# Patient Record
Sex: FEMALE | Race: BLACK OR AFRICAN AMERICAN | Employment: UNEMPLOYED | URBAN - METROPOLITAN AREA
[De-identification: names, ages, dates, MRNs, and addresses within clinical notes are randomized per-mention and may not be internally consistent; named-entity substitution may affect disease eponyms.]

---

## 2017-01-25 DIAGNOSIS — E78.00 PURE HYPERCHOLESTEROLEMIA: ICD-10-CM

## 2017-01-29 LAB
ALBUMIN SERPL-MCNC: 4.2 G/DL (ref 3.5–5.5)
ALP SERPL-CCNC: 103 IU/L (ref 39–117)
ALT SERPL-CCNC: 18 IU/L (ref 0–32)
AST SERPL-CCNC: 17 IU/L (ref 0–40)
BILIRUB DIRECT SERPL-MCNC: 0.13 MG/DL (ref 0–0.4)
BILIRUB SERPL-MCNC: 0.4 MG/DL (ref 0–1.2)
CHOLEST SERPL-MCNC: 157 MG/DL (ref 100–199)
HDL SERPL QN: 10 NM
HDL SERPL-SCNC: 36.2 UMOL/L
HDLC SERPL-MCNC: 66 MG/DL
HLD.LARGE SERPL-SCNC: 11 UMOL/L
INTERPRETATION, 910389: NORMAL
LDL SERPL QN: 20.6 NM
LDL SERPL QN: 20.6 NM
LDL SERPL-SCNC: 585 NMOL/L
LDL SMALL SERPL-SCNC: 232 NMOL/L
LDL SMALL SERPL-SCNC: 232 NMOL/L
LDLC SERPL CALC-MCNC: 75 MG/DL (ref 0–99)
LP-IR SCORE SERPL: 33
PROT SERPL-MCNC: 6.8 G/DL (ref 6–8.5)
TRIGL SERPL-MCNC: 82 MG/DL (ref 0–149)
VLDL LARGE SERPL-SCNC: 2.6 NMOL/L
VLDL SERPL QN: 46.3 NM

## 2017-04-27 ENCOUNTER — OFFICE VISIT (OUTPATIENT)
Dept: FAMILY MEDICINE CLINIC | Age: 57
End: 2017-04-27

## 2017-04-27 VITALS
TEMPERATURE: 98.2 F | SYSTOLIC BLOOD PRESSURE: 121 MMHG | WEIGHT: 197 LBS | HEIGHT: 65 IN | DIASTOLIC BLOOD PRESSURE: 79 MMHG | BODY MASS INDEX: 32.82 KG/M2 | RESPIRATION RATE: 20 BRPM | HEART RATE: 80 BPM

## 2017-04-27 DIAGNOSIS — Z12.39 SCREENING FOR BREAST CANCER: ICD-10-CM

## 2017-04-27 DIAGNOSIS — J06.9 ACUTE URI: Primary | ICD-10-CM

## 2017-04-27 DIAGNOSIS — J30.1 SEASONAL ALLERGIC RHINITIS DUE TO POLLEN: ICD-10-CM

## 2017-04-27 DIAGNOSIS — R05.9 COUGH: ICD-10-CM

## 2017-04-27 RX ORDER — GUAIFENESIN AND DEXTROMETHORPHAN HYDROBROMIDE 1200; 60 MG/1; MG/1
TABLET, EXTENDED RELEASE ORAL
Qty: 20 TAB | Refills: 0 | Status: SHIPPED | OUTPATIENT
Start: 2017-04-27 | End: 2017-10-20

## 2017-04-27 RX ORDER — AZITHROMYCIN 250 MG/1
TABLET, FILM COATED ORAL
Qty: 6 TAB | Refills: 0 | Status: SHIPPED | OUTPATIENT
Start: 2017-04-27 | End: 2017-05-02

## 2017-04-27 RX ORDER — FOLIC ACID 1 MG/1
TABLET ORAL
Refills: 0 | COMMUNITY
Start: 2017-03-26

## 2017-04-27 RX ORDER — CETIRIZINE HCL 10 MG
10 TABLET ORAL DAILY
Qty: 30 TAB | Refills: 5 | Status: SHIPPED | OUTPATIENT
Start: 2017-04-27 | End: 2018-06-08 | Stop reason: SDUPTHER

## 2017-04-27 RX ORDER — METHOTREXATE 2.5 MG/1
TABLET ORAL
COMMUNITY
Start: 2017-04-21 | End: 2022-03-15 | Stop reason: ALTCHOICE

## 2017-04-27 RX ORDER — MOMETASONE FUROATE 50 UG/1
2 SPRAY, METERED NASAL DAILY
Qty: 1 CONTAINER | Refills: 5 | Status: SHIPPED | OUTPATIENT
Start: 2017-04-27 | End: 2019-03-14 | Stop reason: SDUPTHER

## 2017-04-27 NOTE — MR AVS SNAPSHOT
Visit Information Date & Time Provider Department Dept. Phone Encounter #  
 4/27/2017  1:30 PM Karolyn Ross [id number] Follow-up Instructions Return in about 6 months (around 10/27/2017), or if cold symptoms worsen or fail to improve, for routine care. Upcoming Health Maintenance Date Due DTaP/Tdap/Td series (1 - Tdap) 4/18/1983 FOBT Q 1 YEAR AGE 50-75 10/2/2017 PAP AKA CERVICAL CYTOLOGY 7/15/2018 BREAST CANCER SCRN MAMMOGRAM 8/2/2018 Allergies as of 4/27/2017  Review Complete On: 4/27/2017 By: Suni Sheehan LPN Severity Noted Reaction Type Reactions Percocet [Oxycodone-acetaminophen]  04/08/2014    Other (comments) Slows heart rate. Current Immunizations  Reviewed on 10/27/2016 No immunizations on file. Not reviewed this visit You Were Diagnosed With   
  
 Codes Comments Acute URI    -  Primary ICD-10-CM: J06.9 ICD-9-CM: 465.9 Seasonal allergic rhinitis due to pollen     ICD-10-CM: J30.1 ICD-9-CM: 477.0 Cough     ICD-10-CM: R05 ICD-9-CM: 824. 2 Vitals BP Pulse Temp Resp Height(growth percentile) Weight(growth percentile) 121/79 (BP 1 Location: Left arm, BP Patient Position: Sitting) 80 98.2 °F (36.8 °C) (Oral) 20 5' 5\" (1.651 m) 197 lb (89.4 kg) BMI OB Status Smoking Status 32.78 kg/m2 Hysterectomy Never Smoker Vitals History BMI and BSA Data Body Mass Index Body Surface Area 32.78 kg/m 2 2.02 m 2 Preferred Pharmacy Pharmacy Name Phone [id number] [address] [phone] Your Updated Medication List  
  
   
This list is accurate as of: 4/27/17  2:00 PM.  Always use your most recent med list.  
  
  
  
  
 azithromycin 250 mg tablet Commonly known as:  Nery Stover Take 2 tablets today, then take 1 tablet daily  
  
 cetirizine 10 mg tablet Commonly known as:  ZYRTEC Take 1 Tab by mouth daily. For allergy Cholecalciferol (Vitamin D3) 2,000 unit Cap capsule Commonly known as:  VITAMIN D3 Take  by mouth daily. dextromethorphan-guaiFENesin 60-1,200 mg Tb12 Commonly known as:  Ishmael & Ishmael DM Take one tablet by mouth twice daily as needed for cough. folic acid 1 mg tablet Commonly known as:  FOLVITE  
TAKE 1 TABLET BY MOUTH ONCE DAILY  
  
 methotrexate 2.5 mg tablet Commonly known as:  Donneta  * mometasone 50 mcg/actuation nasal spray Commonly known as:  NASONEX  
1 [place] by Both Nostrils route daily. * mometasone 50 mcg/actuation nasal spray Commonly known as:  NASONEX  
2 Sprays by Both Nostrils route daily. For allergy symptoms PATADAY 0.2 % Drop ophthalmic solution Generic drug:  olopatadine Apply 1 Drop to eye daily. predniSONE 5 mg tablet Commonly known as:  Harper Vipul Take 5 mg by mouth daily. traMADol 50 mg tablet Commonly known as:  ULTRAM  
Take 50 mg by mouth every six (6) hours as needed for Pain. VITAMIN C 500 mg tablet Generic drug:  ascorbic acid (vitamin C) Take  by mouth daily. * Notice: This list has 2 medication(s) that are the same as other medications prescribed for you. Read the directions carefully, and ask your doctor or other care provider to review them with you. Prescriptions Sent to Pharmacy Refills  
 mometasone (NASONEX) 50 mcg/actuation nasal spray 5 Si Sprays by Both Nostrils route daily. For allergy symptoms Class: Normal  
 Pharmacy: [facility]/pharmacy #[id number] Ph #: [phone] Route: Both Nostrils  
 cetirizine (ZYRTEC) 10 mg tablet 5 Sig: Take 1 Tab by mouth daily. For allergy Class: Normal  
 Pharmacy: [facility]/pharmacy #[id number] Ph #: [phone] Route: Oral  
 dextromethorphan-guaiFENesin (MUCINEX DM) 60-1,200 mg Tb12 0 Sig: Take one tablet by mouth twice daily as needed for cough. Class: Normal  
 Pharmacy: [facility]/pharmacy #[id number]- [place], [address] [facility] Ph #: [phone]  
 azithromycin (ZITHROMAX) 250 mg tablet 0 Sig: Take 2 tablets today, then take 1 tablet daily Class: Normal  
 Pharmacy: [facility]/pharmacy #[id number]- [place], [address] [facility] Ph #: [phone] Follow-up Instructions Return in about 6 months (around 10/27/2017), or if cold symptoms worsen or fail to improve, for routine care. Patient Instructions Instructions: For stuffy nose, use an OTC nasal decongestant such as Afrin, Dristan or Neosynephrine. Use this away from the Flonase. Introducing [facility] HE[facility]! [facility] introduces [employer] patient portal. Now you can access parts of your medical record, email your doctor's office, and request medication refills online. 1. In your internet browser, go to [email or web address] 2. Click on the First Time User? Click Here link in the Sign In box. You will see the New Member Sign Up page. 3. Enter your [employer] Access Code exactly as it appears below. You will not need to use this code after youve completed the sign-up process. If you do not sign up before the expiration date, you must request a new code. · [employer] Access Code: [email or web address] Expires: 7/26/2017  2:00 PM 
 
4. Enter the last four digits of your Social Security Number (xxxx) and Date of Birth (mm/dd/yyyy) as indicated and click Submit. You will be taken to the next sign-up page. 5. Create a Dev[employer] ID. This will be your Dev[employer] login ID and cannot be changed, so think of one that is secure and easy to remember. 6. Create a [employer] password. You can change your password at any time. 7. Enter your Password Reset Question and Answer. This can be used at a later time if you forget your password. 8. Enter your e-mail address.  You will receive e-mail notification when new information is available in [employer]. 9. Click Sign Up. You can now view and download portions of your medical record. 10. Click the Download Summary menu link to download a portable copy of your medical information. If you have questions, please visit the Frequently Asked Questions section of the [employer] website. Remember, [employer] is NOT to be used for urgent needs. For medical emergencies, dial 911. Now available from your iPhone and Android! Please provide this summary of care documentation to your next provider. Your primary care clinician is listed as Clyde Sims. If you have any questions after today's visit, please call [phone].

## 2017-04-27 NOTE — PATIENT INSTRUCTIONS
Instructions: For stuffy nose, use an OTC nasal decongestant such as Afrin, Dristan or Neosynephrine. Use this away from the Flonase.

## 2017-04-27 NOTE — PROGRESS NOTES
HISTORY OF PRESENT ILLNESS  Pipe Araujo is a 56 y.o. female. HPI  Pt with nasal congestion, cough, no fever. Has allergies. Wants mammogram order. ROS  ROS negative except for symptoms noted in HPI. Physical Exam  Gen: Oriented to person, place and time and well-developed, well-nourished and in no distress. HEENT:    Head: normocephalic and atraumatic. Eyes:  EOM are normal. Pupils equal and round. Ears:  TMs and canals normal, no erythema, bulging or retraction  Mouth:  No posterior pharyngeal erythema or exudates. No tonsillar hypertrophy. Neck:  Normal range of motion. Neck supple. Cardiovascular: normal rate, regular rhythm and normal heart sounds. Pulmonary/Chest:  Effort normal and breath sounds normal.  No respiratory distress. No wheezes, no rales. Abdominal: soft, normal  bowel sounds. Musculoskeletal:  No edema, no tenderness. No calf tenderness or edema. Neurological:  Alert, oriented to person, place and time. Skin: skin is warm and dry. ASSESSMENT and PLAN  Follow-up Disposition:  Return in about 6 months (around 10/27/2017), or if cold symptoms worsen or fail to improve, for routine care. 1. Seasonal allergic rhinitis due to pollen     - mometasone (NASONEX) 50 mcg/actuation nasal spray; 2 Sprays by Both Nostrils route daily. For allergy symptoms  Dispense: 1 Container; Refill: 5  - cetirizine (ZYRTEC) 10 mg tablet; Take 1 Tab by mouth daily. For allergy  Dispense: 30 Tab; Refill: 5    2. Cough     - dextromethorphan-guaiFENesin (MUCINEX DM) 60-1,200 mg Tb12; Take one tablet by mouth twice daily as needed for cough. Dispense: 20 Tab; Refill: 0    3. Acute URI     - azithromycin (ZITHROMAX) 250 mg tablet; Take 2 tablets today, then take 1 tablet daily  Dispense: 6 Tab; Refill: 0    4. Screening for breast cancer     - [facility] MAMMO BI SCREENING INCL CAD;  Future    I  have discussed the diagnosis with the patient and the intended treatment plan as seen in the above orders. Patient has provided input and agrees with goals. The patient has received an after-visit summary and questions were answered concerning future plans. I have discussed medication side effects and warnings with the patient as well.

## 2017-08-16 ENCOUNTER — HOSPITAL ENCOUNTER (OUTPATIENT)
Dept: MAMMOGRAPHY | Age: 57
Discharge: HOME | End: 2017-08-16
Attending: FAMILY MEDICINE
Payer: MEDICARE

## 2017-08-16 DIAGNOSIS — Z12.31 ENCOUNTER FOR SCREENING MAMMOGRAM FOR MALIGNANT NEOPLASM OF BREAST: ICD-10-CM

## 2017-08-16 DIAGNOSIS — Z12.31 VISIT FOR SCREENING MAMMOGRAM: ICD-10-CM

## 2017-08-16 PROCEDURE — 77063 BREAST TOMOSYNTHESIS BI: CPT

## 2017-09-18 ENCOUNTER — HOSPITAL ENCOUNTER (OUTPATIENT)
Dept: MRI IMAGING | Age: 57
Discharge: HOME | End: 2017-09-18
Attending: ORTHOPAEDIC SURGERY
Payer: MEDICARE

## 2017-09-18 DIAGNOSIS — M25.562 LEFT KNEE PAIN: ICD-10-CM

## 2017-09-18 PROCEDURE — 73721 MRI JNT OF LWR EXTRE W/O DYE: CPT

## 2017-10-20 ENCOUNTER — OFFICE VISIT (OUTPATIENT)
Dept: FAMILY MEDICINE CLINIC | Age: 57
End: 2017-10-20

## 2017-10-20 ENCOUNTER — TELEPHONE (OUTPATIENT)
Dept: FAMILY MEDICINE CLINIC | Age: 57
End: 2017-10-20

## 2017-10-20 VITALS
HEART RATE: 71 BPM | BODY MASS INDEX: 32.82 KG/M2 | WEIGHT: 197 LBS | SYSTOLIC BLOOD PRESSURE: 148 MMHG | TEMPERATURE: 98.1 F | HEIGHT: 65 IN | DIASTOLIC BLOOD PRESSURE: 85 MMHG | RESPIRATION RATE: 18 BRPM

## 2017-10-20 DIAGNOSIS — Z23 ENCOUNTER FOR IMMUNIZATION: ICD-10-CM

## 2017-10-20 DIAGNOSIS — E78.00 PURE HYPERCHOLESTEROLEMIA: ICD-10-CM

## 2017-10-20 DIAGNOSIS — Z01.419 ENCOUNTER FOR GYNECOLOGICAL EXAMINATION WITHOUT ABNORMAL FINDING: Primary | ICD-10-CM

## 2017-10-20 DIAGNOSIS — Z12.11 SCREEN FOR COLON CANCER: ICD-10-CM

## 2017-10-20 DIAGNOSIS — R03.0 ELEVATED BLOOD PRESSURE READING: ICD-10-CM

## 2017-10-20 DIAGNOSIS — K21.9 GASTROESOPHAGEAL REFLUX DISEASE, ESOPHAGITIS PRESENCE NOT SPECIFIED: ICD-10-CM

## 2017-10-20 DIAGNOSIS — R13.10 DYSPHAGIA, UNSPECIFIED TYPE: ICD-10-CM

## 2017-10-20 RX ORDER — RANITIDINE 300 MG/1
300 TABLET ORAL DAILY
Qty: 30 TAB | Refills: 1 | Status: SHIPPED | OUTPATIENT
Start: 2017-10-20 | End: 2017-11-17 | Stop reason: SDUPTHER

## 2017-10-20 NOTE — PROGRESS NOTES
Subjective: Hermilo León is a 56 y.o. female here for annual physical exam.  She is having trouble swallowing, like something is in her esophagus. Evidently, she was taking Nexium for her gastroesophageal reflux disease and stopped it because she was concerned about side effects. Now she is having gastroesophageal reflux disease symptoms. Health Habits. Lifestyle:  Occupation:  Disabled due to her osteoarthritis, occipital neuralgia  Household members:  2, patient, spouse  Doing a monthly breast self exam:  yes  Last dental appointment:   About 2 months ago  Last eye exam:  This past April  Uses seatbelts regularly :  yes  Getting regular exercise:  yes  Last colonoscopy:   2013  Last mammogram:  This summer       Patient Active Problem List   Diagnosis Code    Heart palpitations R00.2    Occipital neuralgia M54.81    Allergic rhinitis J30.9    Anxiety F41.9    Depression F32.9    Insomnia G47.00    GERD (gastroesophageal reflux disease) K21.9    Headache(784.0) R51    Cervicalgia M54.2    Migraine without aura, without mention of intractable migraine without mention of status migrainosus G43.009    Family history of premature CAD Z80.55    Pure hypercholesterolemia E78.00    RA (rheumatoid arthritis) ([facility] [address] M06.9     Past Medical History:   Diagnosis Date    Acute meniscal tear of knee 08/15/2017    Dr. Ambika Bhatia    Acute torn meniscus of knee     Right knee    Allergic rhinitis 6/16/2014    Anxiety 7/14/2014    Arthritis     Bone spur     Left shoulder    Dense breast tissue on mammogram     Depression 7/14/2014    Family history of premature CAD 7/15/2015    GERD (gastroesophageal reflux disease) 9/10/2014    Headache     Hx of mammogram April 2014    WNL per pt.  Hyperlipidemia 7/28/2015    Insomnia 7/14/2014    Neuralgia     Occipital neuralgia    Occipital neuralgia 6/16/2014    RA (rheumatoid arthritis) ([facility] [address]     Screening for malignant neoplasm of the cervix Approx.  7 yrs ago    Negative per pt. Past Surgical History:   Procedure Laterality Date    HX CHOLECYSTECTOMY  1986    HX ORTHOPAEDIC  4/16/14    Right knee    HX OTHER SURGICAL  March 2013    Shaved bone spur L shoulder    HX PELVIC LAPAROSCOPY  2001    Laproscopy of abdomen - removal of scar tissue    HX SHOULDER ARTHROSCOPY  6/2013    Left shoulder     HX TOTAL ABDOMINAL HYSTERECTOMY  1999    DUB, fibroids      Family History   Problem Relation Age of Onset    Diabetes Mother     Cancer Mother      cervical    Heart Disease Father 80     MI    Cancer Father      prostate    Cancer Sister      breast    Breast Cancer Sister     Heart Disease Brother 62     MI    Cancer Sister      breast    Breast Cancer Sister     Diabetes Sister     Heart Disease Sister 61     CAD    No Known Problems Sister     No Known Problems Sister     Cancer Brother      leukemia    No Known Problems Brother     No Known Problems Brother     No Known Problems Brother     Thyroid Disease Daughter     No Known Problems Daughter     No Known Problems Son     Heart Disease Paternal Grandmother      Social History   Substance Use Topics    Smoking status: Never Smoker    Smokeless tobacco: Never Used    Alcohol use 1.8 oz/week     3 Cans of beer, 0 Standard drinks or equivalent per week      Comment: Beer     Allergies   Allergen Reactions    Percocet [Oxycodone-Acetaminophen] Other (comments)     Slows heart rate. Current Outpatient Prescriptions   Medication Sig Dispense Refill    methotrexate (RHEUMATREX) 2.5 mg tablet       folic acid (FOLVITE) 1 mg tablet TAKE 1 TABLET BY MOUTH ONCE DAILY  0    mometasone (NASONEX) 50 mcg/actuation nasal spray 2 Sprays by Both Nostrils route daily. For allergy symptoms 1 Container 5    cetirizine (ZYRTEC) 10 mg tablet Take 1 Tab by mouth daily. For allergy 30 Tab 5    predniSONE (DELTASONE) 5 mg tablet Take 5 mg by mouth daily.       PATADAY 0.2 % drop ophthalmic solution Apply 1 Drop to eye daily.  ascorbic acid (VITAMIN C) 500 mg tablet Take  by mouth daily.  mometasone (NASONEX) 50 mcg/actuation nasal spray 1 [place] by Both Nostrils route daily. 1 Container 5    Cholecalciferol, Vitamin D3, 2,000 unit cap Take  by mouth daily.           Review of Systems  A comprehensive review of systems was negative except for: Cardiovascular: positive for lower extremity edema  Gastrointestinal: positive for reflux symptoms  Neurological: positive for headaches and she has occipital neuralgia and has been seen by neurology  Allergic/Immunologic: positive for hay fever    Objective:  Visit Vitals    /85    Pulse 71    Temp 98.1 °F (36.7 °C) (Oral)    Resp 18    Ht 5' 5\" (1.651 m)    Wt 197 lb (89.4 kg)    BMI 32.78 kg/m2     Physical Examination:   General appearance - alert, well appearing, and in no distress  Mental status - alert, oriented to person, place, and time, normal mood, behavior, speech, dress, motor activity, and thought processes  Eyes - pupils equal and reactive, extraocular eye movements intact, sclera anicteric  Ears - bilateral TM's and external ear canals normal  Nose - normal and patent, no erythema, discharge or polyps  Mouth - mucous membranes moist, pharynx normal without lesions and dental hygiene good  Neck - supple, no significant adenopathy, carotids upstroke normal bilaterally, no bruits, thyroid exam: thyroid is normal in size without nodules or tenderness  Lymphatics - no palpable lymphadenopathy, no hepatosplenomegaly  Chest - clear to auscultation, no wheezes, rales or rhonchi, symmetric air entry  Heart - normal rate, regular rhythm, normal S1, S2, no murmurs, rubs, clicks or gallops  Abdomen - soft, nontender, nondistended, no masses or organomegaly  bowel sounds normal  Breasts - breasts appear normal, no suspicious masses, no skin or nipple changes or axillary nodes  PELVIC EXAM: normal external genitalia, vulva, vagina, cervix, uterus and adnexa  Rectal - Rectal - normal rectal, no masses  Neurological - alert, oriented, normal speech, no focal findings or movement disorder noted  Musculoskeletal - normal gait  Extremities - peripheral pulses normal, no pedal edema, no clubbing or cyanosis  Skin - normal coloration and turgor, no rashes, no suspicious skin lesions noted     Assessment/Plan:    ICD-10-CM ICD-9-CM    1. Encounter for gynecological examination without abnormal finding T31.666 I98.94 METABOLIC PANEL, COMPREHENSIVE      CBC WITH AUTOMATED DIFF   2. Dysphagia, unspecified type R13.10 787.20 XR BA SWALLOW ESOPHOGRAM   3. Gastroesophageal reflux disease, esophagitis presence not specified K21.9 530.81 raNITIdine (ZANTAC) 300 mg tablet   4. Pure hypercholesterolemia W49.69 399.5 METABOLIC PANEL, COMPREHENSIVE   5. Elevated blood pressure reading R03.0 796.2    6. Encounter for immunization Z23 V03.89 TETANUS, DIPHTHERIA TOXOIDS AND ACELLULAR PERTUSSIS VACCINE (TDAP), IN INDIVIDS. >=7, IM   7. Screen for colon cancer Z12.11 V76.51 OCCULT BLOOD, IMMUNOASSAY (FIT)         Labs per orders. Barium swallow  Zantac  TDAP at pharmacy    Follow-up Disposition:  Return in about 6 weeks (around 12/1/2017) for reflux, check blood pressure. Reviewed plan of care. Patient has provided input and agrees with goals.

## 2017-10-20 NOTE — PROGRESS NOTES
Chief Complaint   Patient presents with    Well Woman     Chief Complaint   Patient presents with    Well Woman    Dysphagia     x 2 mos     1. Have you been to the ER, urgent care clinic since your last visit? Yes [facility] for knee tear 08/2017  Hospitalized since your last visit? No    2. Have you seen or consulted any other health care providers outside of the [address] [place] [address] since your last visit? Include any pap smears or colon screening.  No    Health Maintenance Due   Topic Date Due    DTaP/Tdap/Td  (1 - Tdap) 04/18/1983    Flu Vaccine  08/01/2017    Stool testing for trace blood  10/02/2017

## 2017-10-20 NOTE — MR AVS SNAPSHOT
Visit Information Date & Time Provider Department Dept. Phone Encounter #  
 10/20/2017  1:00 PM Karolyn Glover [id number] Follow-up Instructions Return in about 6 weeks (around 12/1/2017) for reflux, check blood pressure. Upcoming Health Maintenance Date Due DTaP/Tdap/Td series (1 - Tdap) 4/18/1983 FOBT Q 1 YEAR AGE 50-75 10/2/2017 PAP AKA CERVICAL CYTOLOGY 7/15/2018 BREAST CANCER SCRN MAMMOGRAM 8/16/2019 Allergies as of 10/20/2017  Review Complete On: 10/20/2017 By: Morteza Prince MD  
  
 Severity Noted Reaction Type Reactions Percocet [Oxycodone-acetaminophen]  04/08/2014    Other (comments) Slows heart rate. Current Immunizations  Reviewed on 10/27/2016 Name Date Tdap  Incomplete Not reviewed this visit You Were Diagnosed With   
  
 Codes Comments Encounter for gynecological examination without abnormal finding    -  Primary ICD-10-CM: N58.628 ICD-9-CM: V72.31 Dysphagia, unspecified type     ICD-10-CM: R13.10 ICD-9-CM: 787.20 Gastroesophageal reflux disease, esophagitis presence not specified     ICD-10-CM: K21.9 ICD-9-CM: 530.81 Pure hypercholesterolemia     ICD-10-CM: E78.00 ICD-9-CM: 272.0 Elevated blood pressure reading     ICD-10-CM: R03.0 ICD-9-CM: 796.2 Encounter for immunization     ICD-10-CM: A03 ICD-9-CM: V03.89 Screen for colon cancer     ICD-10-CM: Z12.11 ICD-9-CM: V76.51 Vitals BP Pulse Temp Resp Height(growth percentile) Weight(growth percentile) 148/85 71 98.1 °F (36.7 °C) (Oral) 18 5' 5\" (1.651 m) 197 lb (89.4 kg) BMI OB Status Smoking Status 32.78 kg/m2 Hysterectomy Never Smoker BMI and BSA Data Body Mass Index Body Surface Area 32.78 kg/m 2 2.02 m 2 Preferred Pharmacy Pharmacy Name Phone [facility]ACY #[id number]- [address] [phone] Your Updated Medication List  
  
   
This list is accurate as of: 10/20/17  2:06 PM.  Always use your most recent med list.  
  
  
  
  
 cetirizine 10 mg tablet Commonly known as:  ZYRTEC Take 1 Tab by mouth daily. For allergy Cholecalciferol (Vitamin D3) 2,000 unit Cap capsule Commonly known as:  VITAMIN D3 Take  by mouth daily. folic acid 1 mg tablet Commonly known as:  FOLVITE  
TAKE 1 TABLET BY MOUTH ONCE DAILY  
  
 methotrexate 2.5 mg tablet Commonly known as:  Benedetta Perish * mometasone 50 mcg/actuation nasal spray Commonly known as:  NASONEX  
1 [place] by Both Nostrils route daily. * mometasone 50 mcg/actuation nasal spray Commonly known as:  NASONEX  
2 Sprays by Both Nostrils route daily. For allergy symptoms PATADAY 0.2 % Drop ophthalmic solution Generic drug:  olopatadine Apply 1 Drop to eye daily. predniSONE 5 mg tablet Commonly known as:  Marcelene Im Take 5 mg by mouth daily. raNITIdine 300 mg tablet Commonly known as:  ZANTAC Take 1 Tab by mouth daily. VITAMIN C 500 mg tablet Generic drug:  ascorbic acid (vitamin C) Take  by mouth daily. * Notice: This list has 2 medication(s) that are the same as other medications prescribed for you. Read the directions carefully, and ask your doctor or other care provider to review them with you. Prescriptions Sent to Pharmacy Refills  
 raNITIdine (ZANTAC) 300 mg tablet 1 Sig: Take 1 Tab by mouth daily. Class: Normal  
 Pharmacy: [facility]/pharmacy [address] [place] [address] Ph #: [phone] Route: Oral  
  
We Performed the Following CBC WITH AUTOMATED DIFF [30771 CPT(R)] METABOLIC PANEL, COMPREHENSIVE [66779 CPT(R)] OCCULT BLOOD, IMMUNOASSAY (FIT) [id number] CPT(R)] TETANUS, DIPHTHERIA TOXOIDS AND ACELLULAR PERTUSSIS VACCINE (TDAP), IN INDIVIDS. >=7, IM [id number] CPT(R)] Follow-up Instructions Return in about 6 weeks (around 12/1/2017) for reflux, check blood pressure. To-Do List   
 10/23/2017 Imaging:  XR BA SWALLOW ESOPHOGRAM   
  
  
Introducing datango! Dear Debbie Gruber: 
Thank you for requesting a [employer] account. Our records indicate that you already have an active [employer] account. You can access your account anytime at [email or web address] Did you know that you can access your hospital and ER discharge instructions at any time in [employer]? You can also review all of your test results from your hospital stay or ER visit. Additional Information If you have questions, please visit the Frequently Asked Questions section of the [employer] website at [email or web address] Remember, [employer] is NOT to be used for urgent needs. For medical emergencies, dial 911. Now available from your iPhone and Android! Please provide this summary of care documentation to your next provider. Your primary care clinician is listed as Sari Hart. If you have any questions after today's visit, please call [phone].

## 2017-10-21 LAB
ALBUMIN SERPL-MCNC: 4.5 G/DL (ref 3.5–5.5)
ALBUMIN/GLOB SERPL: 1.7 {RATIO} (ref 1.2–2.2)
ALP SERPL-CCNC: 96 IU/L (ref 39–117)
ALT SERPL-CCNC: 15 IU/L (ref 0–32)
AST SERPL-CCNC: 19 IU/L (ref 0–40)
BASOPHILS # BLD AUTO: 0 X10E3/UL (ref 0–0.2)
BASOPHILS NFR BLD AUTO: 0 %
BILIRUB SERPL-MCNC: 0.6 MG/DL (ref 0–1.2)
BUN SERPL-MCNC: 15 MG/DL (ref 6–24)
BUN/CREAT SERPL: 16 (ref 9–23)
CALCIUM SERPL-MCNC: 9.9 MG/DL (ref 8.7–10.2)
CHLORIDE SERPL-SCNC: 102 MMOL/L (ref 96–106)
CO2 SERPL-SCNC: 24 MMOL/L (ref 18–29)
CREAT SERPL-MCNC: 0.91 MG/DL (ref 0.57–1)
EOSINOPHIL # BLD AUTO: 0.1 X10E3/UL (ref 0–0.4)
EOSINOPHIL NFR BLD AUTO: 2 %
ERYTHROCYTE [DISTWIDTH] IN BLOOD BY AUTOMATED COUNT: 15.7 % (ref 12.3–15.4)
GFR SERPLBLD CREATININE-BSD FMLA CKD-EPI: 71 ML/MIN/1.73
GFR SERPLBLD CREATININE-BSD FMLA CKD-EPI: 82 ML/MIN/1.73
GLOBULIN SER CALC-MCNC: 2.7 G/DL (ref 1.5–4.5)
GLUCOSE SERPL-MCNC: 88 MG/DL (ref 65–99)
HCT VFR BLD AUTO: 37.7 % (ref 34–46.6)
HGB BLD-MCNC: 12.3 G/DL (ref 11.1–15.9)
IMM GRANULOCYTES # BLD: 0 X10E3/UL (ref 0–0.1)
IMM GRANULOCYTES NFR BLD: 0 %
LYMPHOCYTES # BLD AUTO: 2.4 X10E3/UL (ref 0.7–3.1)
LYMPHOCYTES NFR BLD AUTO: 41 %
MCH RBC QN AUTO: 27.8 PG (ref 26.6–33)
MCHC RBC AUTO-ENTMCNC: 32.6 G/DL (ref 31.5–35.7)
MCV RBC AUTO: 85 FL (ref 79–97)
MONOCYTES # BLD AUTO: 0.2 X10E3/UL (ref 0.1–0.9)
MONOCYTES NFR BLD AUTO: 4 %
NEUTROPHILS # BLD AUTO: 3.1 X10E3/UL (ref 1.4–7)
NEUTROPHILS NFR BLD AUTO: 53 %
PLATELET # BLD AUTO: 344 X10E3/UL (ref 150–379)
POTASSIUM SERPL-SCNC: 4.5 MMOL/L (ref 3.5–5.2)
PROT SERPL-MCNC: 7.2 G/DL (ref 6–8.5)
RBC # BLD AUTO: 4.43 X10E6/UL (ref 3.77–5.28)
SODIUM SERPL-SCNC: 142 MMOL/L (ref 134–144)
WBC # BLD AUTO: 5.8 X10E3/UL (ref 3.4–10.8)

## 2017-10-27 ENCOUNTER — HOSPITAL ENCOUNTER (OUTPATIENT)
Dept: GENERAL RADIOLOGY | Age: 57
Discharge: HOME | End: 2017-10-27
Attending: FAMILY MEDICINE
Payer: MEDICARE

## 2017-10-27 DIAGNOSIS — R13.10 DYSPHAGIA, UNSPECIFIED TYPE: ICD-10-CM

## 2017-10-27 PROCEDURE — 74220 X-RAY XM ESOPHAGUS 1CNTRST: CPT

## 2017-11-17 DIAGNOSIS — K21.9 GASTROESOPHAGEAL REFLUX DISEASE, ESOPHAGITIS PRESENCE NOT SPECIFIED: ICD-10-CM

## 2017-11-18 RX ORDER — RANITIDINE 300 MG/1
300 TABLET ORAL DAILY
Qty: 90 TAB | Refills: 3 | Status: SHIPPED | OUTPATIENT
Start: 2017-11-18 | End: 2019-10-02

## 2017-12-04 ENCOUNTER — OFFICE VISIT (OUTPATIENT)
Dept: FAMILY MEDICINE CLINIC | Age: 57
End: 2017-12-04

## 2017-12-04 VITALS
HEART RATE: 78 BPM | TEMPERATURE: 97.8 F | DIASTOLIC BLOOD PRESSURE: 87 MMHG | SYSTOLIC BLOOD PRESSURE: 137 MMHG | HEIGHT: 65 IN | WEIGHT: 205 LBS | RESPIRATION RATE: 18 BRPM | BODY MASS INDEX: 34.16 KG/M2

## 2017-12-04 DIAGNOSIS — R94.31 ABNORMAL EKG: ICD-10-CM

## 2017-12-04 DIAGNOSIS — R00.2 PALPITATIONS: ICD-10-CM

## 2017-12-04 DIAGNOSIS — Z12.11 ENCOUNTER FOR FIT (FECAL IMMUNOCHEMICAL TEST) SCREENING: ICD-10-CM

## 2017-12-04 DIAGNOSIS — R07.9 CHEST PAIN AT REST: Primary | ICD-10-CM

## 2017-12-04 DIAGNOSIS — Z23 ENCOUNTER FOR IMMUNIZATION: ICD-10-CM

## 2017-12-04 DIAGNOSIS — F41.9 ANXIETY: ICD-10-CM

## 2017-12-04 NOTE — PROGRESS NOTES
Chief Complaint   Patient presents with    Hypertension     6 wk f/u    Chest Pain     \"chest tightness when lying down\" and palpitations x 1 mo     1. Have you been to the ER, urgent care clinic since your last visit? No   Hospitalized since your last visit? No    2. Have you seen or consulted any other health care providers outside of the [address] [place] [address] since your last visit? Include any pap smears or colon screening.  No     Health Maintenance Due   Topic Date Due    DTaP/Tdap/Td  (1 - Tdap) 04/18/1983    Stool testing for trace blood  10/02/2017

## 2017-12-04 NOTE — PROGRESS NOTES
HISTORY OF PRESENT ILLNESS  Getachew Faustin is a 56 y.o. female. Hypertension   The history is provided by the patient. This is a chronic problem. The current episode started more than 1 week ago. The problem occurs constantly. The problem has been gradually improving. Associated symptoms include chest pain. Pertinent negatives include no abdominal pain, no headaches and no shortness of breath. Nothing aggravates the symptoms. Nothing relieves the symptoms. She has tried nothing for the symptoms. Chest Pain    The history is provided by the patient. This is a new problem. Episode onset: about a month ago. The problem has not changed since onset. Episode Length: about 5 minutes. The problem occurs every several days. Associated with: palpitations sometimes. The pain is present in the substernal region. The pain is mild. The quality of the pain is described as pressure-like. The pain does not radiate. Exacerbated by: nothing. Associated symptoms include dizziness, malaise/fatigue, near-syncope and palpitations. Pertinent negatives include no abdominal pain, no diaphoresis, no exertional chest pressure, no headaches, no irregular heartbeat, no lower extremity edema, no nausea, no shortness of breath and no vomiting. She has tried nothing (rest helps sometimes) for the symptoms. Risk factors include family history, dyslipidemia, obesity and stress. Her past medical history does not include aneurysm, cancer, DM, DVT, HTN, PE or CHF. Procedural history includes echocardiogram.Past workup comments: 24 hour Holter. Review of Systems   Constitutional: Positive for malaise/fatigue. Negative for diaphoresis and weight loss. No weight gain   Eyes: Negative for blurred vision. Respiratory: Negative for shortness of breath. Cardiovascular: Positive for chest pain, palpitations and near-syncope. Negative for leg swelling. Gastrointestinal: Positive for heartburn.  Negative for abdominal pain, nausea and vomiting. Neurological: Positive for dizziness. Negative for sensory change, speech change, focal weakness and headaches. Psychiatric/Behavioral: Positive for depression. Negative for substance abuse and suicidal ideas. The patient is nervous/anxious and has insomnia. She enjoys life. Trouble sleeping once or twice a week. Visit Vitals    /87    Pulse 78    Temp 97.8 °F (36.6 °C) (Oral)    Resp 18    Ht 5' 5\" (1.651 m)    Wt 205 lb (93 kg)    BMI 34.11 kg/m2     BP Readings from Last 3 Encounters:   12/04/17 137/87   10/20/17 148/85   04/27/17 121/79     Physical Exam   Constitutional: She is oriented to person, place, and time. She appears well-developed and well-nourished. No distress. Neck: Normal carotid pulses present. Carotid bruit is not present. Cardiovascular: Normal rate, regular rhythm, normal heart sounds and intact distal pulses. Exam reveals no gallop and no friction rub. No murmur heard. Pulmonary/Chest: Effort normal and breath sounds normal. No respiratory distress. She has no wheezes. She has no rales. Musculoskeletal: She exhibits no edema. Neurological: She is alert and oriented to person, place, and time. Skin: Skin is warm and dry. She is not diaphoretic. Nursing note and vitals reviewed. EKG - NSR without STTW changes, normal intervals and axis, meets voltage criteria for LVH    ASSESSMENT and PLAN    ICD-10-CM ICD-9-CM    1. Chest pain at rest R07.9 786.50 AMB POC EKG ROUTINE W/ 12 LEADS, INTER & REP      REFERRAL TO CARDIOLOGY   2. Palpitations R00.2 785.1 TSH 3RD GENERATION      REFERRAL TO CARDIOLOGY   3. Anxiety F41.9 300.00    4. Abnormal EKG R94.31 794.31 REFERRAL TO CARDIOLOGY   5. Encounter for immunization Z23 V03.89 Diphth, Pertus,Acell,, Tetanus (BOOSTRIX TDAP) 2.5-8-5 Lf-mcg-Lf/0.5mL susp susp   6.  Encounter for FIT (fecal immunochemical test) screening Z12.11 V76.51 OCCULT BLOOD, IMMUNOASSAY (FIT)        Atypical chest pain and palpitations, likely anxiety related  LVH on EKG, needs further evaluation  Check TSH  Cardiology referral  TDAP at pharmacy  Labs per orders. Follow-up Disposition:  Return in about 6 weeks (around 1/15/2018) for blood pressure, palpitations, anxiety. Reviewed plan of care. Patient has provided input and agrees with goals.

## 2017-12-24 LAB — HEMOCCULT STL QL IA: NEGATIVE

## 2018-05-03 ENCOUNTER — TELEPHONE (OUTPATIENT)
Dept: CARDIOLOGY CLINIC | Age: 58
End: 2018-05-03

## 2018-05-03 NOTE — TELEPHONE ENCOUNTER
Patient would like for you to call dr Farida Jacobs office to obtain her records   Phone (): [phone]  Phone(pt): [phone]

## 2018-05-04 ENCOUNTER — OFFICE VISIT (OUTPATIENT)
Dept: CARDIOLOGY CLINIC | Age: 58
End: 2018-05-04

## 2018-05-04 VITALS
WEIGHT: 208.6 LBS | DIASTOLIC BLOOD PRESSURE: 78 MMHG | OXYGEN SATURATION: 98 % | SYSTOLIC BLOOD PRESSURE: 108 MMHG | RESPIRATION RATE: 19 BRPM | HEART RATE: 70 BPM | HEIGHT: 65 IN | BODY MASS INDEX: 34.75 KG/M2

## 2018-05-04 DIAGNOSIS — R00.2 PALPITATIONS: Primary | ICD-10-CM

## 2018-05-04 RX ORDER — ASPIRIN 81 MG/1
TABLET ORAL DAILY
COMMUNITY
End: 2019-12-16

## 2018-05-04 NOTE — PROGRESS NOTES
Chief Complaint   Patient presents with    Abnormal EKG    Palpitations     1. Have you been to the ER, urgent care clinic since your last visit? Hospitalized since your last visit? No    2. Have you seen or consulted any other health care providers outside of the [address] [place] [address] since your last visit? Include any pap smears or colon screening.  No    Visit Vitals    /78 (BP 1 Location: Left arm, BP Patient Position: Sitting)    Pulse 70    Resp 19    Ht 5' 5\" (1.651 m)    Wt 208 lb 9.6 oz (94.6 kg)    SpO2 98%    BMI 34.71 kg/m2

## 2018-05-04 NOTE — PROGRESS NOTES
Office Follow-up    NAME: Hector Bernal   :  1960  MRM:  [id number]    Date:  2018            Assessment:     Problem List  Date Reviewed: 2018          Codes Class Noted    RA (rheumatoid arthritis) ([facility] ICD-10-CM: M06.9  ICD-9-CM: 714.0  Unknown        Pure hypercholesterolemia ICD-10-CM: E78.00  ICD-9-CM: 272.0  2015        Family history of premature CAD ICD-10-CM: Z82.49  ICD-9-CM: V17.3  7/15/2015        Headache(784.0) ICD-10-CM: R51  ICD-9-CM: 784.0  2014        Cervicalgia ICD-10-CM: M54.2  ICD-9-CM: 723.1  2014        Migraine without aura, without mention of intractable migraine without mention of status migrainosus ICD-10-CM: G43.009  ICD-9-CM: 346.10  2014        GERD (gastroesophageal reflux disease) ICD-10-CM: K21.9  ICD-9-CM: 530.81  9/10/2014        Anxiety ICD-10-CM: F41.9  ICD-9-CM: 300.00  2014        Depression ICD-10-CM: F32.9  ICD-9-CM: 311  2014        Insomnia ICD-10-CM: G47.00  ICD-9-CM: 780.52  2014        Occipital neuralgia ICD-10-CM: M54.81  ICD-9-CM: 723.8  2014        Allergic rhinitis ICD-10-CM: J30.9  ICD-9-CM: 477.9  2014        Heart palpitations ICD-10-CM: R00.2  ICD-9-CM: 785.1  2014    Overview Signed 2014  5:49 PM by Lamberto Waters MD     Holter 13 [address]:   1558 Supraventricular ectopies  271 PVC, 12 couplets. 2 pauses 2.0 sec                      Plan:     1. Palpitations: She is known to have supraventricular complexes. In  she had a Holter monitor performed and [place] which demonstrated 1500 supraventricular complexes, 271 PVCs and 2 episodes of 2.0 second pause. Since this date days old I recommend doing another Holter monitor to see if she has any changes. I think she will need a beta-blocker but we should hold off until we get the Holter monitor recordings. 2. Fatigue: Unclear etiology. She seems to get enough sleep. She snores occasionally.   Does not have significant risk factors for sleep apnea. Stress test recently was negative for ischemia. LV function is normal.  Continue to observe. Increase activity. Continue to lose weight. 3. Phone follow-up of the test results. Subjective: Vernell Call, a 66y.o. year-old who presents for followup. She has known history of palpitations, fatigue, chest pain which was atypical.  I had last seen her in December 2015 she has seen Dr. Roman Mireles in past 2 years. He has performed a stress nuclear study in December 2017 which was reported as normal perfusion with normal LV function. He also performed an echocardiogram which were reported as normal.  She is here for follow-up. She continues to feel palpitations. Her symptoms are intermittent and lasts for a few seconds to a few minutes. There is no symptoms of recurrent chest pain, lightheadedness, dizziness, presyncope or syncope. There is no lower extremity edema. She is exercising and staying active on a regular basis. She makes healthy life choices. Records from Dr. Seth Lancaster office were personally reviewed.     Exam:     Physical Exam:  Visit Vitals    /78 (BP 1 Location: Left arm, BP Patient Position: Sitting)    Pulse 70    Resp 19    Ht 5' 5\" (1.651 m)    Wt 208 lb 9.6 oz (94.6 kg)    SpO2 98%    BMI 34.71 kg/m2     General appearance - alert, well appearing, and in no distress  Mental status - affect appropriate to mood  Eyes - sclera anicteric, moist mucous membranes  Neck - supple, no significant adenopathy  Chest - clear to auscultation, no wheezes, rales or rhonchi  Heart - normal rate, regular rhythm, normal S1, S2, no murmurs, rubs, clicks or gallops  Abdomen - soft, nontender, nondistended, no masses or organomegaly  Extremities - peripheral pulses normal, no pedal edema  Skin - normal coloration  no rashes    Medications:     Current Outpatient Prescriptions   Medication Sig    aspirin delayed-release 81 mg tablet Take by mouth daily.  raNITIdine (ZANTAC) 300 mg tablet Take 1 Tab by mouth daily.  methotrexate (RHEUMATREX) 2.5 mg tablet     folic acid (FOLVITE) 1 mg tablet TAKE 1 TABLET BY MOUTH ONCE DAILY    mometasone (NASONEX) 50 mcg/actuation nasal spray 2 Sprays by Both Nostrils route daily. For allergy symptoms    cetirizine (ZYRTEC) 10 mg tablet Take 1 Tab by mouth daily. For allergy    predniSONE (DELTASONE) 5 mg tablet Take 5 mg by mouth daily.  PATADAY 0.2 % drop ophthalmic solution Apply 1 Drop to eye daily.  ascorbic acid (VITAMIN C) 500 mg tablet Take  by mouth daily.  mometasone (NASONEX) 50 mcg/actuation nasal spray 1 [place] by Both Nostrils route daily.  Cholecalciferol, Vitamin D3, 2,000 unit cap Take  by mouth daily. No current facility-administered medications for this visit. Diagnostic Data Review:     EKG: Sinus rhythm with T inversion in inferior leads unchanged from EKG of Dec 2017    12/6/17: ECHO- EF 50-55%; Mild TR; Trace NM  6/4/14: ECHO- EF 55%, mild TR      Lab Review:     Lab Results   Component Value Date/Time    Cholesterol, Total 157 01/27/2017 03:48 PM     Lab Results   Component Value Date/Time    Creatinine (POC) 0.9 08/06/2015 11:09 AM    Creatinine 0.91 10/20/2017 02:35 PM     Lab Results   Component Value Date/Time    BUN 15 10/20/2017 02:35 PM    BUN (POC) 6 (L) 08/06/2015 11:09 AM     Lab Results   Component Value Date/Time    Potassium 4.5 10/20/2017 02:35 PM     No results found for: HBA1C, HGBE8, [id number]  Lab Results   Component Value Date/Time    Hemoglobin (POC) 11.2 (L) 08/06/2015 11:09 AM    HGB 12.3 10/20/2017 02:35 PM     Lab Results   Component Value Date/Time    PLATELET 775 36/01/2627 02:35 PM     No results for input(s): CPK, CKMB, TROIQ in the last 72 hours. No lab exists for component: CKQMB, CPKMB             ___________________________________________________    Josefina Kate.  Becki Hill MD, [facility]

## 2018-05-07 ENCOUNTER — TELEPHONE (OUTPATIENT)
Dept: CARDIOLOGY CLINIC | Age: 58
End: 2018-05-07

## 2018-05-07 ENCOUNTER — OFFICE VISIT (OUTPATIENT)
Dept: CARDIOLOGY CLINIC | Age: 58
End: 2018-05-07

## 2018-05-07 DIAGNOSIS — R00.2 PALPITATIONS: Primary | ICD-10-CM

## 2018-05-07 NOTE — TELEPHONE ENCOUNTER
Please schedule 7 day loop for pt per VO of Dr. Gerardo Casillas. Pt prefers [employer] call her cell phone: [phone]. Thank you!

## 2018-05-07 NOTE — TELEPHONE ENCOUNTER
Pt called stating she wanted her cell phone to be called for her to get her heart monitor and not her house phone. Pt can be reached @ [phone]. Thanks!

## 2018-06-08 DIAGNOSIS — J30.1 SEASONAL ALLERGIC RHINITIS DUE TO POLLEN: ICD-10-CM

## 2018-06-08 RX ORDER — CETIRIZINE HCL 10 MG
10 TABLET ORAL DAILY
Qty: 30 TAB | Refills: 11 | Status: SHIPPED | OUTPATIENT
Start: 2018-06-08 | End: 2019-09-07 | Stop reason: SDUPTHER

## 2018-06-12 ENCOUNTER — TELEPHONE (OUTPATIENT)
Dept: CARDIOLOGY CLINIC | Age: 58
End: 2018-06-12

## 2018-06-12 NOTE — TELEPHONE ENCOUNTER
Pt is calling to get her loop monitor results and would like to know when she needs to come in. She can be reached @ [phone]. Pt is aware that you are not in the office today.      Thanks

## 2018-06-14 NOTE — TELEPHONE ENCOUNTER
Call placed to discuss Loop results with pt per VO of Dr. Carlos Lu. [facility]. Dr. Carlos Lu to review results tomorrow.  Advised pt will call back; however, nothing alarming on test.

## 2018-06-14 NOTE — TELEPHONE ENCOUNTER
Patient is calling regarding loop monitor results and would like to know when she needs to come in for a follow up appointment.   Phone [phone]  Sd Grant

## 2018-06-18 NOTE — TELEPHONE ENCOUNTER
Call placed to discuss loop results results with pt per VO of Dr. Stuart Wade. [facility] for call back. Pt testing is normal. No new recommendations at this time. No need to start on medication. Continue to monitor. Follow up as needed for concerning symptomology.

## 2018-06-26 ENCOUNTER — TELEPHONE (OUTPATIENT)
Dept: CARDIOLOGY CLINIC | Age: 58
End: 2018-06-26

## 2018-06-27 ENCOUNTER — TELEPHONE (OUTPATIENT)
Dept: CARDIOLOGY CLINIC | Age: 58
End: 2018-06-27

## 2018-06-27 NOTE — TELEPHONE ENCOUNTER
Attempted return call to pt. No answer and no VM option. Loop results normal. No new recommendations at this time pt to f/u PRN.

## 2018-07-02 ENCOUNTER — TELEPHONE (OUTPATIENT)
Dept: CARDIOLOGY CLINIC | Age: 58
End: 2018-07-02

## 2018-07-02 NOTE — TELEPHONE ENCOUNTER
Call placed to discuss results. No answer. No VM. Pt has normal monitor results. Follow up as needed with Dr. Henrik Vieyra. This is 4 attempt to contact pt.

## 2018-07-02 NOTE — TELEPHONE ENCOUNTER
Return call placed to pt (IDx2). Discussed normal results and follow up as needed with Dr. Kathya Portillo.

## 2018-08-17 ENCOUNTER — HOSPITAL ENCOUNTER (OUTPATIENT)
Dept: MAMMOGRAPHY | Age: 58
Discharge: HOME | End: 2018-08-17
Attending: FAMILY MEDICINE
Payer: MEDICARE

## 2018-08-17 DIAGNOSIS — Z12.31 ENCOUNTER FOR SCREENING MAMMOGRAM FOR MALIGNANT NEOPLASM OF BREAST: ICD-10-CM

## 2018-08-17 PROCEDURE — 77063 BREAST TOMOSYNTHESIS BI: CPT

## 2018-09-21 ENCOUNTER — OFFICE VISIT (OUTPATIENT)
Dept: FAMILY MEDICINE CLINIC | Age: 58
End: 2018-09-21

## 2018-09-21 VITALS
BODY MASS INDEX: 34.16 KG/M2 | TEMPERATURE: 98.5 F | RESPIRATION RATE: 18 BRPM | DIASTOLIC BLOOD PRESSURE: 84 MMHG | HEIGHT: 65 IN | HEART RATE: 71 BPM | SYSTOLIC BLOOD PRESSURE: 131 MMHG | WEIGHT: 205 LBS

## 2018-09-21 DIAGNOSIS — R09.89 FOREIGN BODY SENSATION IN THROAT: Primary | ICD-10-CM

## 2018-09-21 NOTE — MR AVS SNAPSHOT
[address] [id number] Patient: Mauricio Sullivan MRN: [id number] MAN:[id number] Visit Information Date & Time Provider Department Dept. Phone Encounter #  
 9/21/2018  2:00 PM Shiva Morrow MD [address] [place] [id number] Follow-up Instructions Return if symptoms worsen or fail to improve. Upcoming Health Maintenance Date Due DTaP/Tdap/Td series (1 - Tdap) 4/18/1983 MEDICARE YEARLY EXAM 3/14/2018 PAP AKA CERVICAL CYTOLOGY 7/15/2018 Influenza Age 5 to Adult 8/1/2018 FOBT Q 1 YEAR AGE 50-75 12/12/2018 BREAST CANCER SCRN MAMMOGRAM 8/17/2020 Allergies as of 9/21/2018  Review Complete On: 9/21/2018 By: Shiva Morrow MD  
  
 Severity Noted Reaction Type Reactions Percocet [Oxycodone-acetaminophen]  04/08/2014    Other (comments) Slows heart rate. Current Immunizations  Reviewed on 10/27/2016 No immunizations on file. Not reviewed this visit You Were Diagnosed With   
  
 Codes Comments Foreign body sensation in throat    -  Primary ICD-10-CM: R09.89 ICD-9-CM: 784.99 Vitals BP Pulse Temp Resp Height(growth percentile) Weight(growth percentile) 131/84 71 98.5 °F (36.9 °C) (Oral) 18 5' 5\" (1.651 m) 205 lb (93 kg) BMI OB Status Smoking Status 34.11 kg/m2 Hysterectomy Never Smoker Vitals History BMI and BSA Data Body Mass Index Body Surface Area  
 34.11 kg/m 2 2.07 m 2 Preferred Pharmacy Pharmacy Name Phone [id number] [address] [phone] Your Updated Medication List  
  
   
This list is accurate as of 9/21/18  2:46 PM.  Always use your most recent med list.  
  
  
  
  
 aspirin delayed-release 81 mg tablet Take  by mouth daily. cetirizine 10 mg tablet Commonly known as:  ZYRTEC  
 Take 1 Tab by mouth daily. For allergy Cholecalciferol (Vitamin D3) 2,000 unit Cap capsule Commonly known as:  VITAMIN D3 Take  by mouth daily. folic acid 1 mg tablet Commonly known as:  FOLVITE  
TAKE 1 TABLET BY MOUTH ONCE DAILY  
  
 methotrexate 2.5 mg tablet Commonly known as:  Brenna Schfabyke * mometasone 50 mcg/actuation nasal spray Commonly known as:  NASONEX  
1 [place] by Both Nostrils route daily. * mometasone 50 mcg/actuation nasal spray Commonly known as:  NASONEX  
2 Sprays by Both Nostrils route daily. For allergy symptoms PATADAY 0.2 % Drop ophthalmic solution Generic drug:  olopatadine Apply 1 Drop to eye daily. predniSONE 5 mg tablet Commonly known as:  Jerson Gun Take 5 mg by mouth daily. raNITIdine 300 mg Tab Commonly known as:  ZANTAC Take 1 Tab by mouth daily. VITAMIN C 500 mg tablet Generic drug:  ascorbic acid (vitamin C) Take  by mouth daily. * Notice: This list has 2 medication(s) that are the same as other medications prescribed for you. Read the directions carefully, and ask your doctor or other care provider to review them with you. We Performed the Following REFERRAL TO ENT-OTOLARYNGOLOGY [AOP65 Custom] Comments:  
 Foreign body sensation in throat Follow-up Instructions Return if symptoms worsen or fail to improve. Referral Information Referral ID Referred By Referred To  
  
 [id number] [facility] [address] [place] [address]  [place], [address] [facility] Visits Status Start Date End Date 1 New Request 9/21/18 9/21/19 If your referral has a status of pending review or denied, additional information will be sent to support the outcome of this decision. Introducing [address]! Dear Rayne Sánchez: 
Thank you for requesting a [employer] account.   Our records indicate that you already have an active [employer] account. You can access your account anytime at [email or web address] Did you know that you can access your hospital and ER discharge instructions at any time in [employer]? You can also review all of your test results from your hospital stay or ER visit. Additional Information If you have questions, please visit the Frequently Asked Questions section of the [employer] website at [email or web address] Remember, [employer] is NOT to be used for urgent needs. For medical emergencies, dial 911. Now available from your iPhone and Android! Please provide this summary of care documentation to your next provider. Your primary care clinician is listed as Sharron Carter. If you have any questions after today's visit, please call [phone].

## 2018-09-21 NOTE — PROGRESS NOTES
Chief Complaint   Patient presents with    Mass     in throat; \"like something's in throat\"; dentist recommended pt see dr due to seeing \"bubble-like\" spot on back of throat     1. Have you been to the ER, urgent care clinic since your last visit? No  Hospitalized since your last visit? No     2. Have you seen or consulted any other health care providers outside of the [address] [place] [address] since your last visit? Include any pap smears or colon screening.  No

## 2018-09-21 NOTE — PROGRESS NOTES
HISTORY OF PRESENT ILLNESS  Easton Lizama is a 66 y.o. female. HPI Comments: Easton Lizama is here due her dentist sending here her due to a potential throat mass. The patient said her dentist saw a bubble in her throat. Evidently, she has a lot of sore throats and a foreign body sensation. Review of Systems   Constitutional: Negative for chills and fever. HENT: Positive for congestion and sore throat. Negative for ear pain. Visit Vitals    /84    Pulse 71    Temp 98.5 °F (36.9 °C) (Oral)    Resp 18    Ht 5' 5\" (1.651 m)    Wt 205 lb (93 kg)    BMI 34.11 kg/m2     Physical Exam   Constitutional: She is oriented to person, place, and time. She appears well-developed and well-nourished. No distress. HENT:   Mouth/Throat: Oropharynx is clear and moist.   Lymphadenopathy:     She has no cervical adenopathy. Neurological: She is alert and oriented to person, place, and time. Skin: She is not diaphoretic. ASSESSMENT and PLAN    ICD-10-CM ICD-9-CM    1. Foreign body sensation in throat R09.89 784.99 REFERRAL TO ENT-OTOLARYNGOLOGY        Otolaryngology referral    Follow-up Disposition:  Return if symptoms worsen or fail to improve. Reviewed plan of care. Patient has provided input and agrees with goals.

## 2018-10-22 ENCOUNTER — OFFICE VISIT (OUTPATIENT)
Dept: FAMILY MEDICINE CLINIC | Age: 58
End: 2018-10-22

## 2018-10-22 VITALS
TEMPERATURE: 97.8 F | RESPIRATION RATE: 18 BRPM | HEIGHT: 65 IN | BODY MASS INDEX: 35.49 KG/M2 | DIASTOLIC BLOOD PRESSURE: 74 MMHG | SYSTOLIC BLOOD PRESSURE: 121 MMHG | WEIGHT: 213 LBS | HEART RATE: 88 BPM

## 2018-10-22 DIAGNOSIS — Z71.89 ACP (ADVANCE CARE PLANNING): ICD-10-CM

## 2018-10-22 DIAGNOSIS — Z23 ENCOUNTER FOR IMMUNIZATION: ICD-10-CM

## 2018-10-22 DIAGNOSIS — Z12.11 SCREEN FOR COLON CANCER: ICD-10-CM

## 2018-10-22 DIAGNOSIS — Z00.00 MEDICARE ANNUAL WELLNESS VISIT, SUBSEQUENT: Primary | ICD-10-CM

## 2018-10-22 RX ORDER — ESOMEPRAZOLE MAGNESIUM 40 MG/1
CAPSULE, DELAYED RELEASE ORAL
Refills: 12 | COMMUNITY
Start: 2018-10-11 | End: 2019-12-16

## 2018-10-22 NOTE — PROGRESS NOTES
Chief Complaint   Patient presents with   Lzibeth Yang Annual Wellness Visit     1. Have you been to the ER, urgent care clinic since your last visit? Hospitalized since your last visit? No     2. Have you seen or consulted any other health care providers outside of the [address] [place] [address] since your last visit? Include any pap smears or colon screening.  Yes GI

## 2018-10-22 NOTE — PROGRESS NOTES
This is the Subsequent Medicare Annual Wellness Exam, performed 12 months or more after the Initial AWV or the last Subsequent AWV    I have reviewed the patient's medical history in detail and updated the computerized patient record. History     Past Medical History:   Diagnosis Date    Acute meniscal tear of knee 08/15/2017    Dr. Jolene Cee    Acute torn meniscus of knee     Right knee    Allergic rhinitis 6/16/2014    Anxiety 7/14/2014    Arthritis     Bone spur     Left shoulder    Dense breast tissue on mammogram     Depression 7/14/2014    Family history of premature CAD 7/15/2015    GERD (gastroesophageal reflux disease) 9/10/2014    Headache     Hx of mammogram April 2014    WNL per pt.  Hyperlipidemia 7/28/2015    Insomnia 7/14/2014    Neuralgia     Occipital neuralgia    Occipital neuralgia 6/16/2014    RA (rheumatoid arthritis) ([facility] [address]     Screening for malignant neoplasm of the cervix Approx. 7 yrs ago    Negative per pt. Past Surgical History:   Procedure Laterality Date    HX CHOLECYSTECTOMY  1986    HX ORTHOPAEDIC  4/16/14    Right knee    HX OTHER SURGICAL  March 2013    Shaved bone spur L shoulder    HX PELVIC LAPAROSCOPY  2001    Laproscopy of abdomen - removal of scar tissue    HX SHOULDER ARTHROSCOPY  6/2013    Left shoulder     HX TOTAL ABDOMINAL HYSTERECTOMY  1999    DUB, fibroids     Current Outpatient Medications   Medication Sig Dispense Refill    esomeprazole (NEXIUM) 40 mg capsule TAKE 1 CAPSULE BY MOUTH EVERY DAY  12    cetirizine (ZYRTEC) 10 mg tablet Take 1 Tab by mouth daily. For allergy 30 Tab 11    aspirin delayed-release 81 mg tablet Take  by mouth daily.  raNITIdine (ZANTAC) 300 mg tablet Take 1 Tab by mouth daily. 90 Tab 3    methotrexate (RHEUMATREX) 2.5 mg tablet       folic acid (FOLVITE) 1 mg tablet TAKE 1 TABLET BY MOUTH ONCE DAILY  0    mometasone (NASONEX) 50 mcg/actuation nasal spray 2 Sprays by Both Nostrils route daily.  For allergy symptoms 1 Container 5    predniSONE (DELTASONE) 5 mg tablet Take 5 mg by mouth daily.  PATADAY 0.2 % drop ophthalmic solution Apply 1 Drop to eye daily.  ascorbic acid (VITAMIN C) 500 mg tablet Take  by mouth daily.  mometasone (NASONEX) 50 mcg/actuation nasal spray 1 [place] by Both Nostrils route daily. 1 Container 5    Cholecalciferol, Vitamin D3, 2,000 unit cap Take  by mouth daily. Allergies   Allergen Reactions    Percocet [Oxycodone-Acetaminophen] Other (comments)     Slows heart rate. Family History   Problem Relation Age of Onset    Diabetes Mother     Cancer Mother         cervical    Heart Disease Father 80        MI    Cancer Father         prostate    Cancer Sister         breast    Breast Cancer Sister     Heart Disease Brother 62        MI    Cancer Sister         breast    Breast Cancer Sister     Diabetes Sister     Heart Disease Sister 61        CAD    No Known Problems Sister     No Known Problems Sister     Cancer Brother         leukemia    No Known Problems Brother     No Known Problems Brother     No Known Problems Brother     Thyroid Disease Daughter     No Known Problems Daughter     No Known Problems Son     Heart Disease Paternal Grandmother      Social History     Tobacco Use    Smoking status: Never Smoker    Smokeless tobacco: Never Used   Substance Use Topics    Alcohol use:  Yes     Alcohol/week: 1.8 oz     Types: 3 Cans of beer per week     Comment: Beer     Patient Active Problem List   Diagnosis Code    Heart palpitations R00.2    Occipital neuralgia M54.81    Allergic rhinitis J30.9    Anxiety F41.9    Depression F32.9    Insomnia G47.00    GERD (gastroesophageal reflux disease) K21.9    Headache(784.0) R51    Cervicalgia M54.2    Migraine without aura, without mention of intractable migraine without mention of status migrainosus G43.009    Family history of premature CAD Z80.55    Pure hypercholesterolemia E78.00    RA (rheumatoid arthritis) ([facility]) M06.9       Depression Risk Factor Screening:     PHQ over the last two weeks 10/22/2018   Little interest or pleasure in doing things Several days   Feeling down, depressed, irritable, or hopeless Several days   Total Score PHQ 2 2     Alcohol Risk Factor Screening: You do not drink alcohol or very rarely. Functional Ability and Level of Safety:   Hearing Loss  Hearing is good. Activities of Daily Living  The home contains: no safety equipment. Patient does total self care    Fall Risk  Fall Risk Assessment, last 12 mths 10/22/2018   Able to walk? Yes   Fall in past 12 months? No       Abuse Screen  Patient is not abused    Cognitive Screening   Evaluation of Cognitive Function:  Has your family/caregiver stated any concerns about your memory: no  Normal, Clock Drawing test    Patient Care Team   Patient Care Team:  Kristina Jones MD as PCP - General (Family Practice)  Indra Hewitt MD (Rheumatology)      Visit Vitals  /74   Pulse 88   Temp 97.8 °F (36.6 °C) (Oral)   Resp 18   Ht 5' 5\" (1.651 m)   Wt 213 lb (96.6 kg)   BMI 35.45 kg/m²   General appearance - alert, well appearing, and in no distress  Chest - clear to auscultation, no wheezes, rales or rhonchi, symmetric air entry   Heart - normal rate, regular rhythm, normal S1, S2, no murmurs, rubs, clicks or gallops   Ext-no pedal edema noted      Assessment/Plan   Education and counseling provided:  Are appropriate based on today's review and evaluation  End-of-Life planning (with patient's consent)   Patient plans to complete an advanced directive and bring it in  [address] was discussed but the patient did not wish or was not able to name a surrogate decision maker or provide an Advance Care Plan       Diagnoses and all orders for this visit:    1. Medicare annual wellness visit, subsequent    2.  Encounter for immunization  -     diphtheria-pertussis, acellular,-tetanus ([address] TDAP) 2.5-8-5 Lf-mcg-Lf/0.5mL susp susp; 0.5 mL by IntraMUSCular route once for 1 dose. 3. ACP (advance care planning)  Comments:  Patient plans to complete an advanced directive and bring it in    4. Screen for colon cancer  -     REFERRAL TO GASTROENTEROLOGY        Follow-up Disposition:  Return in about 1 year (around 10/22/2019) for Medicare Wellness. Reviewed plan of care. Patient has provided input and agrees with goals.             Health Maintenance Due   Topic Date Due    DTaP/Tdap/Td series (1 - Tdap) 04/18/1983    Shingrix Vaccine Age 50> (1 of 2) 04/18/2012    MEDICARE YEARLY EXAM  03/14/2018    PAP AKA CERVICAL CYTOLOGY  07/15/2018    Influenza Age 5 to Adult  08/01/2018    FOBT Q 1 YEAR AGE 50-75  12/12/2018

## 2018-10-22 NOTE — PATIENT INSTRUCTIONS
Medicare Wellness Visit, Female     The best way to live healthy is to have a lifestyle where you eat a well-balanced diet, exercise regularly, limit alcohol use, and quit all forms of tobacco/nicotine, if applicable. Regular preventive services are another way to keep healthy. Preventive services (vaccines, screening tests, monitoring & exams) can help personalize your care plan, which helps you manage your own care. Screening tests can find health problems at the earliest stages, when they are easiest to treat. [facility]n [facility] follows the current, evidence-based guidelines published by the [facility] ([facility]) when recommending preventive services for our patients. Because we follow these guidelines, sometimes recommendations change over time as research supports it. (For example, mammograms used to be recommended annually. Even though Medicare will still pay for an annual mammogram, the newer guidelines recommend a mammogram every two years for women of average risk.)  Of course, you and your doctor may decide to screen more often for some diseases, based on your risk and your health status. Preventive services for you include:  - Medicare offers their members a free annual wellness visit, which is time for you and your primary care provider to discuss and plan for your preventive service needs. Take advantage of this benefit every year!  -All adults over the age of 72 should receive the recommended pneumonia vaccines. Current USPSTF guidelines recommend a series of two vaccines for the best pneumonia protection.   -All adults should have a flu vaccine yearly and a tetanus vaccine every 10 years. All adults age 61 and older should receive a shingles vaccine once in their lifetime.    -A bone mass density test is recommended when a woman turns 65 to screen for osteoporosis. This test is only recommended one time, as a screening.  Some providers will use this same test as a disease monitoring tool if you already have osteoporosis. -All adults age 38-68 who are overweight should have a diabetes screening test once every three years.   -Other screening tests and preventive services for persons with diabetes include: an eye exam to screen for diabetic retinopathy, a kidney function test, a foot exam, and stricter control over your cholesterol.   -Cardiovascular screening for adults with routine risk involves an electrocardiogram (ECG) at intervals determined by your doctor.   -Colorectal cancer screenings should be done for adults age 54-65 with no increased risk factors for colorectal cancer. There are a number of acceptable methods of screening for this type of cancer. Each test has its own benefits and drawbacks. Discuss with your doctor what is most appropriate for you during your annual wellness visit. The different tests include: colonoscopy (considered the best screening method), a fecal occult blood test, a fecal DNA test, and sigmoidoscopy. -Breast cancer screenings are recommended every other year for women of normal risk, age 54-69.  -Cervical cancer screenings for women over age 72 are only recommended with certain risk factors.   -All adults born between [facility] should be screened once for Hepatitis C. Here is a list of your current Health Maintenance items (your personalized list of preventive services) with a due date:  Health Maintenance Due   Topic Date Due    DTaP/Tdap/Td  (1 - Tdap) 04/18/1983    Shingles Vaccine (1 of 2) 04/18/2012    Annual Well Visit  03/14/2018    Cervical Cancer Screening  07/15/2018    Flu Vaccine  08/01/2018    Stool testing for trace blood  12/12/2018         Medicare Wellness Visit, Female     The best way to live healthy is to have a lifestyle where you eat a well-balanced diet, exercise regularly, limit alcohol use, and quit all forms of tobacco/nicotine, if applicable.    Regular preventive services are another way to keep healthy. Preventive services (vaccines, screening tests, monitoring & exams) can help personalize your care plan, which helps you manage your own care. Screening tests can find health problems at the earliest stages, when they are easiest to treat. Jn Ibrahim follows the current, evidence-based guidelines published by the [facility] [place] ([facility]) when recommending preventive services for our patients. Because we follow these guidelines, sometimes recommendations change over time as research supports it. (For example, mammograms used to be recommended annually. Even though Medicare will still pay for an annual mammogram, the newer guidelines recommend a mammogram every two years for women of average risk.)  Of course, you and your doctor may decide to screen more often for some diseases, based on your risk and your health status. Preventive services for you include:  - Medicare offers their members a free annual wellness visit, which is time for you and your primary care provider to discuss and plan for your preventive service needs. Take advantage of this benefit every year!  -All adults over the age of 72 should receive the recommended pneumonia vaccines. Current USPSTF guidelines recommend a series of two vaccines for the best pneumonia protection.   -All adults should have a flu vaccine yearly and a tetanus vaccine every 10 years. All adults age 61 and older should receive a shingles vaccine once in their lifetime.    -A bone mass density test is recommended when a woman turns 65 to screen for osteoporosis. This test is only recommended one time, as a screening. Some providers will use this same test as a disease monitoring tool if you already have osteoporosis.   -All adults age 38-68 who are overweight should have a diabetes screening test once every three years.   -Other screening tests and preventive services for persons with diabetes include: an eye exam to screen for diabetic retinopathy, a kidney function test, a foot exam, and stricter control over your cholesterol.   -Cardiovascular screening for adults with routine risk involves an electrocardiogram (ECG) at intervals determined by your doctor.   -Colorectal cancer screenings should be done for adults age 54-65 with no increased risk factors for colorectal cancer. There are a number of acceptable methods of screening for this type of cancer. Each test has its own benefits and drawbacks. Discuss with your doctor what is most appropriate for you during your annual wellness visit. The different tests include: colonoscopy (considered the best screening method), a fecal occult blood test, a fecal DNA test, and sigmoidoscopy. -Breast cancer screenings are recommended every other year for women of normal risk, age 54-69.  -Cervical cancer screenings for women over age 72 are only recommended with certain risk factors.   -All adults born between [facility] should be screened once for Hepatitis C.      Here is a list of your current Health Maintenance items (your personalized list of preventive services) with a due date:  Health Maintenance Due   Topic Date Due    DTaP/Tdap/Td  (1 - Tdap) 04/18/1983    Shingles Vaccine (1 of 2) 04/18/2012    Annual Well Visit  03/14/2018    Cervical Cancer Screening  07/15/2018    Flu Vaccine  08/01/2018    Stool testing for trace blood  12/12/2018

## 2018-10-31 ENCOUNTER — ANESTHESIA (OUTPATIENT)
Dept: ENDOSCOPY | Age: 58
End: 2018-10-31
Payer: MEDICARE

## 2018-10-31 ENCOUNTER — ANESTHESIA EVENT (OUTPATIENT)
Dept: ENDOSCOPY | Age: 58
End: 2018-10-31
Payer: MEDICARE

## 2018-10-31 ENCOUNTER — HOSPITAL ENCOUNTER (OUTPATIENT)
Age: 58
Setting detail: OUTPATIENT SURGERY
Discharge: HOME | End: 2018-10-31
Attending: INTERNAL MEDICINE | Admitting: INTERNAL MEDICINE
Payer: MEDICARE

## 2018-10-31 VITALS
TEMPERATURE: 98 F | BODY MASS INDEX: 33.82 KG/M2 | OXYGEN SATURATION: 100 % | DIASTOLIC BLOOD PRESSURE: 92 MMHG | HEART RATE: 67 BPM | SYSTOLIC BLOOD PRESSURE: 137 MMHG | RESPIRATION RATE: 20 BRPM | WEIGHT: 203 LBS | HEIGHT: 65 IN

## 2018-10-31 LAB
H PYLORI FROM TISSUE: NEGATIVE
KIT LOT NO., HCLOLOT: NORMAL
NEGATIVE CONTROL: NEGATIVE
POSITIVE CONTROL: POSITIVE

## 2018-10-31 PROCEDURE — 74011250636 HC RX REV CODE- 250/636: Performed by: INTERNAL MEDICINE

## 2018-10-31 PROCEDURE — C1726 CATH, BAL DIL, NON-VASCULAR: HCPCS | Performed by: INTERNAL MEDICINE

## 2018-10-31 PROCEDURE — 76060000031 HC ANESTHESIA FIRST 0.5 HR: Performed by: INTERNAL MEDICINE

## 2018-10-31 PROCEDURE — 74011000250 HC RX REV CODE- 250

## 2018-10-31 PROCEDURE — 87077 CULTURE AEROBIC IDENTIFY: CPT | Performed by: INTERNAL MEDICINE

## 2018-10-31 PROCEDURE — 76040000019: Performed by: INTERNAL MEDICINE

## 2018-10-31 PROCEDURE — 74011250636 HC RX REV CODE- 250/636

## 2018-10-31 PROCEDURE — 77030009426 HC FCPS BIOP ENDOSC BSC -B: Performed by: INTERNAL MEDICINE

## 2018-10-31 RX ORDER — ATROPINE SULFATE 0.1 MG/ML
0.4 INJECTION INTRAVENOUS
Status: DISCONTINUED | OUTPATIENT
Start: 2018-10-31 | End: 2018-10-31 | Stop reason: HOSPADM

## 2018-10-31 RX ORDER — SODIUM CHLORIDE 9 MG/ML
INJECTION, SOLUTION INTRAVENOUS
Status: DISCONTINUED | OUTPATIENT
Start: 2018-10-31 | End: 2018-10-31 | Stop reason: HOSPADM

## 2018-10-31 RX ORDER — GLYCOPYRROLATE 0.2 MG/ML
INJECTION INTRAMUSCULAR; INTRAVENOUS AS NEEDED
Status: DISCONTINUED | OUTPATIENT
Start: 2018-10-31 | End: 2018-10-31 | Stop reason: HOSPADM

## 2018-10-31 RX ORDER — MIDAZOLAM HYDROCHLORIDE 1 MG/ML
.25-5 INJECTION, SOLUTION INTRAMUSCULAR; INTRAVENOUS
Status: DISCONTINUED | OUTPATIENT
Start: 2018-10-31 | End: 2018-10-31 | Stop reason: HOSPADM

## 2018-10-31 RX ORDER — SODIUM CHLORIDE 9 MG/ML
50 INJECTION, SOLUTION INTRAVENOUS CONTINUOUS
Status: DISCONTINUED | OUTPATIENT
Start: 2018-10-31 | End: 2018-10-31 | Stop reason: HOSPADM

## 2018-10-31 RX ORDER — LIDOCAINE HYDROCHLORIDE 20 MG/ML
INJECTION, SOLUTION EPIDURAL; INFILTRATION; INTRACAUDAL; PERINEURAL AS NEEDED
Status: DISCONTINUED | OUTPATIENT
Start: 2018-10-31 | End: 2018-10-31 | Stop reason: HOSPADM

## 2018-10-31 RX ORDER — EPINEPHRINE 0.1 MG/ML
1 INJECTION INTRACARDIAC; INTRAVENOUS
Status: DISCONTINUED | OUTPATIENT
Start: 2018-10-31 | End: 2018-10-31 | Stop reason: HOSPADM

## 2018-10-31 RX ORDER — NALOXONE HYDROCHLORIDE 0.4 MG/ML
0.4 INJECTION, SOLUTION INTRAMUSCULAR; INTRAVENOUS; SUBCUTANEOUS
Status: DISCONTINUED | OUTPATIENT
Start: 2018-10-31 | End: 2018-10-31 | Stop reason: HOSPADM

## 2018-10-31 RX ORDER — FLUMAZENIL 0.1 MG/ML
0.2 INJECTION INTRAVENOUS
Status: DISCONTINUED | OUTPATIENT
Start: 2018-10-31 | End: 2018-10-31 | Stop reason: HOSPADM

## 2018-10-31 RX ORDER — PROPOFOL 10 MG/ML
INJECTION, EMULSION INTRAVENOUS AS NEEDED
Status: DISCONTINUED | OUTPATIENT
Start: 2018-10-31 | End: 2018-10-31 | Stop reason: HOSPADM

## 2018-10-31 RX ORDER — DEXTROMETHORPHAN/PSEUDOEPHED 2.5-7.5/.8
1.2 DROPS ORAL
Status: DISCONTINUED | OUTPATIENT
Start: 2018-10-31 | End: 2018-10-31 | Stop reason: HOSPADM

## 2018-10-31 RX ADMIN — SODIUM CHLORIDE: 9 INJECTION, SOLUTION INTRAVENOUS at 14:45

## 2018-10-31 RX ADMIN — PROPOFOL 20 MG: 10 INJECTION, EMULSION INTRAVENOUS at 15:00

## 2018-10-31 RX ADMIN — SODIUM CHLORIDE 50 ML/HR: 900 INJECTION, SOLUTION INTRAVENOUS at 14:03

## 2018-10-31 RX ADMIN — PROPOFOL 100 MG: 10 INJECTION, EMULSION INTRAVENOUS at 14:57

## 2018-10-31 RX ADMIN — GLYCOPYRROLATE 0.1 MG: 0.2 INJECTION INTRAMUSCULAR; INTRAVENOUS at 14:55

## 2018-10-31 RX ADMIN — LIDOCAINE HYDROCHLORIDE 50 MG: 20 INJECTION, SOLUTION EPIDURAL; INFILTRATION; INTRACAUDAL; PERINEURAL at 14:55

## 2018-10-31 RX ADMIN — PROPOFOL 50 MG: 10 INJECTION, EMULSION INTRAVENOUS at 15:02

## 2018-10-31 RX ADMIN — PROPOFOL 30 MG: 10 INJECTION, EMULSION INTRAVENOUS at 14:58

## 2018-10-31 NOTE — ANESTHESIA PREPROCEDURE EVALUATION
Anesthetic History   No history of anesthetic complications            Review of Systems / Medical History  Patient summary reviewed, nursing notes reviewed and pertinent labs reviewed    Pulmonary  Within defined limits              Comments: Allergic rhinitis   Neuro/Psych         Psychiatric history (anxiety/depression)     Cardiovascular                  Exercise tolerance: >4 METS     GI/Hepatic/Renal     GERD: poorly controlled           Endo/Other        Arthritis     Other Findings   Comments: Occipital neuralgia  RA           Physical Exam    Airway  Mallampati: II  TM Distance: > 6 cm  Neck ROM: normal range of motion   Mouth opening: Normal     Cardiovascular    Rhythm: regular  Rate: normal         Dental    Dentition: Lower dentition intact and Upper dentition intact     Pulmonary  Breath sounds clear to auscultation               Abdominal         Other Findings            Anesthetic Plan    ASA: 2  Anesthesia type: MAC          Induction: Intravenous  Anesthetic plan and risks discussed with: Patient

## 2018-10-31 NOTE — ANESTHESIA POSTPROCEDURE EVALUATION
Procedure(s):  ESOPHAGOGASTRODUODENOSCOPY (EGD)  ESOPHAGOGASTRODUODENAL (EGD) BIOPSY  ENDOSCOPY WITH ESOPHAGEAL DILATATION. Anesthesia Post Evaluation      Multimodal analgesia: multimodal analgesia used between 6 hours prior to anesthesia start to PACU discharge  Patient location during evaluation: bedside  Patient participation: complete - patient participated  Level of consciousness: awake  Pain management: adequate  Airway patency: patent  Anesthetic complications: no  Cardiovascular status: acceptable  Respiratory status: acceptable  Hydration status: acceptable        Visit Vitals  BP (!) 137/92   Pulse 67   Temp 36.7 °C (98 °F)   Resp 20   Ht 5' 5\" (1.651 m)   Wt 92.1 kg (203 lb)   SpO2 100%   Breastfeeding?  No   BMI 33.78 kg/m²

## 2018-10-31 NOTE — DISCHARGE INSTRUCTIONS
Dev Lomax M.D.  [phone]           10/31/2018  Jasmine Viramontes  :  1960  [facility] Record Number:  [id number]        ENDOSCOPY FINDINGS:   Your endoscopy showed schatzkis ring. EGD DISCHARGE INSTRUCTIONS    DISCOMFORT:  Sore throat- throat lozenges or warm salt water gargle  redness at IV site- apply warm compress to area; if redness or soreness persist- contact your physician  Gaseous discomfort- walking, belching will help relieve any discomfort  You may not operate a vehicle for 12 hours  You may not engage in an occupation involving machinery or appliances for rest of today  You may not drink alcoholic beverages for at least 12 hours  Avoid making any critical decisions for at least 24 hour    DIET:   You may resume your regular diet. ACTIVITY  Spend the remainder of the day resting -  avoid any strenuous activity. Avoid driving or operating machinery. CALL M.D. ANY SIGN OF   Increasing pain, nausea, vomiting  Abdominal distension (swelling)  New increased bleeding (oral or rectal)  Fever (chills)  Pain in chest area  Bloody discharge from nose or mouth  Shortness of breath    Follow-up Instructions:   Call Dr. Shilpi Villanueva for any questions or problems. Telephone # [phone]  If biopsies were obtained, the results will be available  in  5 to 7 days. We will call you to notify you of these results. Continue same medications. Follow up in the office.

## 2018-10-31 NOTE — PROCEDURES
Juan Diego Kennedy M.D.  [phone]           10/31/2018                EGD Operative Report  Alexandrea Gutiérrez  :  1960  [facility] [place] Medical Record Number:  [id number]      Indication: Dysphagia     : Artis Chung MD    Referring Provider:  Maliha Bliss MD      Anesthesia/Sedation:  MAC anesthesia Propofol    Airway assessment: No airway problems anticipated    Pre-Procedural Exam:      Airway: clear, no airway problems anticipated  Heart: RRR, without gallops or rubs  Lungs: clear bilaterally without wheezes, crackles, or rhonchi  Abdomen: soft, nontender, nondistended, bowel sounds present  Mental Status: awake, alert and oriented to person, place and time       Procedure Details     After infomed consent was obtained for the procedure, with all risks and benefits of procedure explained the patient was taken to the endoscopy suite and placed in the left lateral decubitus position. Following sequential administration of sedation as per above, the endoscope was inserted into the mouth and advanced under direct vision to second portion of the duodenum. A careful inspection was made as the gastroscope was withdrawn, including a retroflexed view of the proximal stomach; findings and interventions are described below. Findings:   Esophagus:A non-obstructing Schatzki's ring was noted. This was disrupted with a 20mm balloon  Stomach: normal   Duodenum/jejunum: normal    Therapies:  esophageal dilation with 20mm sized balloon  biopsy of stomach- SADE test    Specimens: SADE test           Complications:   None; patient tolerated the procedure well. EBL:  None. Impression:    A non-obstructing Schatzki's ring was noted. This was disrupted with a 20mm balloon                A SADE test was obtained to rule out H.pylori infection                Normal EGD otherwise      Recommendations:    -Await SADE test result and treat for Helicobacter pylori if positive. , -Follow symptoms. ,   -No NSAIDS,   -follow up in the office in 6-8 weeks.  Please call for an appointment    Flash Olvera MD

## 2018-10-31 NOTE — PROGRESS NOTES
Report received from GUSTAVO Roland (CRNA). See anesthesia note. ABD remains soft and non-tender post procedure. Pt has no complaints at this time and tolerated the procedure well. Endoscope was pre-cleaned at bedside immediately following procedure by Alyx Villa.     H. Pylori obtained by Dr. Mehta Cargo [phone]

## 2018-10-31 NOTE — ROUTINE PROCESS
Chilango Karlo  1960  [id number]    Situation:  Verbal report received from: Lucy  Procedure: Procedure(s):  ESOPHAGOGASTRODUODENOSCOPY (EGD)    Background:    Preoperative diagnosis: DYSPHAGIA  Postoperative diagnosis: * No post-op diagnosis entered *    :  Dr. Chelsea Parish  Assistant(s): Endoscopy Technician-1: Felicita Felix  Endoscopy RN-1: Gume Tomlinson RN    Specimens: * No specimens in log *  H. Pylori  yes    Assessment:  Intra-procedure medications     Anesthesia gave intra-procedure sedation and medications, see anesthesia flow sheet yes    Intravenous fluids: NS@ KVO     Vital signs stable     Abdominal assessment: round and soft     Recommendation:  Discharge patient per MD order.     Family or Friend   Permission to share finding with family or friend yes

## 2018-11-01 ENCOUNTER — PATIENT OUTREACH (OUTPATIENT)
Dept: FAMILY MEDICINE CLINIC | Age: 58
End: 2018-11-01

## 2018-11-01 NOTE — PROGRESS NOTES
Hospital Discharge Follow-Up      Date/Time:  2018 9:56 AM    Patient was admitted to [facility] on 10/31/18 and discharged on 10/31/18 for chong-schatzkis ring. The physician discharge summary was available at the time of outreach. Patient was contacted within 2 business days of discharge. Top Challenges reviewed with the provider   endoscopy-schatzkis ring         Method of communication with provider :none    Inpatient RRAT score: 0  Was this a readmission? no   Patient stated reason for the readmission: NA    Nurse Navigator (NN) contacted the patient by telephone to perform post hospital discharge assessment. Verified name and  with patient as identifiers. Provided introduction to self, and explanation of the Nurse Navigator role. Reviewed discharge instructions and red flags with patient who verbalized understanding. Patient given an opportunity to ask questions and does not have any further questions or concerns at this time. The patient agrees to contact the PCP office for questions related to their healthcare. NN provided contact information for future reference. Disease Specific:   N/A    Summary of patient's top problems:  1. schatzkis ring      Home Health orders at discharge: [address]: NA  Date of initial visit: NA    Durable Medical Equipment ordered/company: NA  Durable Medical Equipment received: NA    Barriers to care? NA    Advance Care Planning:   Does patient have an Advance Directive:  not on file     Medication(s):   New Medications at Discharge: NA  Changed Medications at Discharge: NA  Discontinued Medications at Discharge: NA    Medication reconciliation was performed with patient, who verbalizes understanding of administration of home medications. There were no barriers to obtaining medications identified at this time.     Referral to Pharm D needed: no     Current Outpatient Medications   Medication Sig    aspirin/salicylamide/caffeine (BC HEADACHE POWDER PO) Take  by mouth.  esomeprazole (NEXIUM) 40 mg capsule TAKE 1 CAPSULE BY MOUTH EVERY DAY    cetirizine (ZYRTEC) 10 mg tablet Take 1 Tab by mouth daily. For allergy    aspirin delayed-release 81 mg tablet Take  by mouth daily.  raNITIdine (ZANTAC) 300 mg tablet Take 1 Tab by mouth daily.  methotrexate (RHEUMATREX) 2.5 mg tablet     folic acid (FOLVITE) 1 mg tablet TAKE 1 TABLET BY MOUTH ONCE DAILY    mometasone (NASONEX) 50 mcg/actuation nasal spray 2 Sprays by Both Nostrils route daily. For allergy symptoms    predniSONE (DELTASONE) 5 mg tablet Take 5 mg by mouth daily.  PATADAY 0.2 % drop ophthalmic solution Apply 1 Drop to eye daily.  ascorbic acid (VITAMIN C) 500 mg tablet Take  by mouth daily.  mometasone (NASONEX) 50 mcg/actuation nasal spray 1 [place] by Both Nostrils route daily.  Cholecalciferol, Vitamin D3, 2,000 unit cap Take  by mouth daily. No current facility-administered medications for this visit. There are no discontinued medications. [facility] follow up appointment(s): No future appointments.    Non-[facility] follow up appointment(s): NA  [facility]:  n/a

## 2018-11-19 ENCOUNTER — PATIENT OUTREACH (OUTPATIENT)
Dept: FAMILY MEDICINE CLINIC | Age: 58
End: 2018-11-19

## 2018-11-19 NOTE — PROGRESS NOTES
Follow up call placed to patient. Patient was admitted to [place] [address] on 10/31/18 and discharged on 10/31/18 [facility]. The physician discharge summary was available at the time of outreach. No answer, message left requesting return call.

## 2018-12-04 ENCOUNTER — APPOINTMENT (OUTPATIENT)
Dept: GENERAL RADIOLOGY | Age: 58
End: 2018-12-04
Attending: EMERGENCY MEDICINE
Payer: MEDICARE

## 2018-12-04 ENCOUNTER — HOSPITAL ENCOUNTER (EMERGENCY)
Age: 58
Discharge: HOME | End: 2018-12-04
Attending: EMERGENCY MEDICINE
Payer: MEDICARE

## 2018-12-04 VITALS
DIASTOLIC BLOOD PRESSURE: 94 MMHG | HEIGHT: 65 IN | OXYGEN SATURATION: 100 % | TEMPERATURE: 97.8 F | BODY MASS INDEX: 32.49 KG/M2 | SYSTOLIC BLOOD PRESSURE: 160 MMHG | RESPIRATION RATE: 14 BRPM | WEIGHT: 195 LBS | HEART RATE: 74 BPM

## 2018-12-04 DIAGNOSIS — M54.50 LOW BACK PAIN WITHOUT SCIATICA, UNSPECIFIED BACK PAIN LATERALITY, UNSPECIFIED CHRONICITY: Primary | ICD-10-CM

## 2018-12-04 PROCEDURE — 72100 X-RAY EXAM L-S SPINE 2/3 VWS: CPT

## 2018-12-04 PROCEDURE — 99281 EMR DPT VST MAYX REQ PHY/QHP: CPT

## 2018-12-04 RX ORDER — PREDNISONE 20 MG/1
20 TABLET ORAL DAILY
Qty: 5 TAB | Refills: 0 | Status: SHIPPED | OUTPATIENT
Start: 2018-12-04 | End: 2018-12-09

## 2018-12-04 RX ORDER — TRAMADOL HYDROCHLORIDE 50 MG/1
50 TABLET ORAL
Qty: 20 TAB | Refills: 0 | Status: SHIPPED | OUTPATIENT
Start: 2018-12-04 | End: 2018-12-09

## 2018-12-04 RX ORDER — PREDNISONE 5 MG/1
5 TABLET ORAL DAILY
Qty: 7 TAB | Refills: 0 | Status: SHIPPED | OUTPATIENT
Start: 2018-12-04 | End: 2022-05-16

## 2018-12-04 NOTE — ED TRIAGE NOTES
Pt with c/o of pain to lower back radiating to mid back. Pt with hx of RA and knee problems and pt thinks this has exacerbated back pain. Pt states pain is worsening over past 2-3 days \"pulling\" and \"burning\". Denies any loss of bowel or bladder control.

## 2018-12-04 NOTE — ED PROVIDER NOTES
66 y.o. female with past medical history significant for torn meniscus of knee, occipital neuralgia, GERD, arthritis, hyperlipidemia, and RA who presents from home with chief complaint of back pain. Pt reports she has been having back pain \"for a while\" which worsened 2 days ago and now includes a \"pulling\" sensation. She rates her current pain as 6/10, but states it is significantly worsened by movement or standing and radiates to her bilateral legs. Pt notes her chronic back pain is usually intermittent, but it is now constant. Pt reports she has prior hx of knee problems; she has had surgery on her R knee, but \"still needs surgery\" on her L knee. She takes 2.5 mg prednisone and methotrexate for RA, but they have not changed her back pain. Pt denies taking other medication for pain. Pt denies bowel/bladder changes. There are no other acute medical concerns at this time. PCP: Jay Castaneda MD    Note written by Lenin Matute, as dictated by David Kulkarni MD 3:10 PM             Past Medical History:   Diagnosis Date    Acute meniscal tear of knee 08/15/2017    Dr. Tressa Romero    Acute torn meniscus of knee     Right knee    Allergic rhinitis 6/16/2014    Anxiety 7/14/2014    Arthritis     Bone spur     Left shoulder    Dense breast tissue on mammogram     Depression 7/14/2014    Family history of premature CAD 7/15/2015    GERD (gastroesophageal reflux disease) 9/10/2014    Headache     Hx of mammogram April 2014    WNL per pt.  Hyperlipidemia 7/28/2015    Insomnia 7/14/2014    Neuralgia     Occipital neuralgia    Occipital neuralgia 6/16/2014    RA [facility]rheumatoid arthritis) ([facility] [address]     Screening for malignant neoplasm of the cervix Approx. 7 yrs ago    Negative per pt.        Past Surgical History:   Procedure Laterality Date    HX CHOLECYSTECTOMY  1986    HX ORTHOPAEDIC  4/16/14    Right knee    HX OTHER SURGICAL  March 2013    Shaved bone spur L shoulder    HX PELVIC LAPAROSCOPY  2001 Laproscopy of abdomen - removal of scar tissue    HX SHOULDER ARTHROSCOPY  6/2013    Left shoulder     HX TONSILLECTOMY      HX TOTAL ABDOMINAL HYSTERECTOMY  1999    DUB, fibroids         Family History:   Problem Relation Age of Onset    Diabetes Mother     Cancer Mother         cervical    Heart Disease Father 80        MI    Cancer Father         prostate    Cancer Sister         breast    Breast Cancer Sister     Heart Disease Brother 62        MI    Cancer Sister         breast    Breast Cancer Sister     Diabetes Sister     Heart Disease Sister 61        CAD    No Known Problems Sister     No Known Problems Sister     Cancer Brother         leukemia    No Known Problems Brother     No Known Problems Brother     No Known Problems Brother     Thyroid Disease Daughter     No Known Problems Daughter     No Known Problems Son     Heart Disease Paternal Grandmother        Social History     Socioeconomic History    Marital status:      Spouse name: Not on file    Number of children: Not on file    Years of education: Not on file    Highest education level: Not on file   Social Needs    Financial resource strain: Not on file    Food insecurity - worry: Not on file    Food insecurity - inability: Not on file   [email or web address] needs - medical: Not on file   [email or web address] needs - non-medical: Not on file   Occupational History    Not on file   Tobacco Use    Smoking status: Never Smoker    Smokeless tobacco: Never Used   Substance and Sexual Activity    Alcohol use:  Yes     Alcohol/week: 1.8 oz     Types: 3 Cans of beer per week     Comment: Beer    Drug use: No    Sexual activity: Yes     Partners: Male     Birth control/protection: Surgical   Other Topics Concern    Dental Braces Not Asked    Endoscopic Camera Pill Not Asked    Metallic Foreign Body Not Asked    Medication Patches Not Asked    Taking Feraheme Not Asked    Claustrophobic Not Asked    Removable Dental Work Not Asked    Hearing Aids Not Asked    Body Piercing Not Asked    Radiation Seeds Not Asked    Pregnant or Breast Feeding Not Asked    Wounded by Shrapnel or Bullet Not Asked    Other-See Comment Not Asked    Other Implant-See Comment Not Asked   Social History Narrative    Not on file         ALLERGIES: Percocet [oxycodone-acetaminophen]    Review of Systems   Constitutional: Negative for fever. Eyes: Negative for visual disturbance. Respiratory: Negative for cough, shortness of breath and wheezing. Cardiovascular: Negative for chest pain and leg swelling. Gastrointestinal: Negative for abdominal pain, diarrhea, nausea and vomiting. Genitourinary: Negative for dysuria. Musculoskeletal: Positive for back pain. Negative for neck stiffness. Skin: Negative for rash. Neurological: Negative. Negative for syncope and headaches. Psychiatric/Behavioral: Negative for confusion. All other systems reviewed and are negative. Vitals:    12/04/18 1510   BP: (!) 160/94   Pulse: 74   Resp: 14   Temp: 97.8 °F (36.6 °C)   SpO2: 100%   Weight: 88.5 kg (195 lb)   Height: 5' 4.5\" (1.638 m)            Physical Exam   Constitutional: She appears well-developed and well-nourished. No distress. HENT:   Head: Normocephalic. Eyes: Pupils are equal, round, and reactive to light. Neck: Normal range of motion. Cardiovascular: Normal rate and regular rhythm. No murmur heard. Pulmonary/Chest: Effort normal and breath sounds normal. No respiratory distress. Abdominal: Soft. There is no tenderness. Musculoskeletal: Normal range of motion. She exhibits tenderness. She exhibits no edema. Tender over L-sided mid-back to lumbar area. 1+ reflexes at knee and ankles bilaterally. Neurological: She is alert. She has normal strength. No cranial nerve deficit. Skin: Skin is warm and dry. Psychiatric: She has a normal mood and affect.  Her behavior is normal.   Nursing note and vitals reviewed.   Note written by Lenin May, as dictated by Kiesha Gould MD 3:10 PM    MDM       Procedures

## 2018-12-04 NOTE — DISCHARGE INSTRUCTIONS
Back Pain: Care Instructions  Your Care Instructions    Back pain has many possible causes. It is often related to problems with muscles and ligaments of the back. It may also be related to problems with the nerves, discs, or bones of the back. Moving, lifting, standing, sitting, or sleeping in an awkward way can strain the back. Sometimes you don't notice the injury until later. Arthritis is another common cause of back pain. Although it may hurt a lot, back pain usually improves on its own within several weeks. Most people recover in 12 weeks or less. Using good home treatment and being careful not to stress your back can help you feel better sooner. Follow-up care is a key part of your treatment and safety. Be sure to make and go to all appointments, and call your doctor if you are having problems. It's also a good idea to know your test results and keep a list of the medicines you take. How can you care for yourself at home? · Sit or lie in positions that are most comfortable and reduce your pain. Try one of these positions when you lie down:  ? Lie on your back with your knees bent and supported by large pillows. ? Lie on the floor with your legs on the seat of a sofa or chair. ? Lie on your side with your knees and hips bent and a pillow between your legs. ? Lie on your stomach if it does not make pain worse. · Do not sit up in bed, and avoid soft couches and twisted positions. Bed rest can help relieve pain at first, but it delays healing. Avoid bed rest after the first day of back pain. · Change positions every 30 minutes. If you must sit for long periods of time, take breaks from sitting. Get up and walk around, or lie in a comfortable position. · Try using a heating pad on a low or medium setting for 15 to 20 minutes every 2 or 3 hours. Try a warm shower in place of one session with the heating pad. · You can also try an ice pack for 10 to 15 minutes every 2 to 3 hours.  Put a thin cloth between the ice pack and your skin. · Take pain medicines exactly as directed. ? If the doctor gave you a prescription medicine for pain, take it as prescribed. ? If you are not taking a prescription pain medicine, ask your doctor if you can take an over-the-counter medicine. · Take short walks several times a day. You can start with 5 to 10 minutes, 3 or 4 times a day, and work up to longer walks. Walk on level surfaces and avoid hills and stairs until your back is better. · Return to work and other activities as soon as you can. Continued rest without activity is usually not good for your back. · To prevent future back pain, do exercises to stretch and strengthen your back and stomach. Learn how to use good posture, safe lifting techniques, and proper body mechanics. When should you call for help? Call your doctor now or seek immediate medical care if:    · You have new or worsening numbness in your legs.     · You have new or worsening weakness in your legs. (This could make it hard to stand up.)     · You lose control of your bladder or bowels.    Watch closely for changes in your health, and be sure to contact your doctor if:    · You have a fever, lose weight, or don't feel well.     · You do not get better as expected. Where can you learn more? Go to http://casey-charis.info/. Enter O459 in the search box to learn more about \"Back Pain: Care Instructions. \"  Current as of: November 29, 2017  Content Version: 11.8  © 6745-3888 [employer]. Care instructions adapted under license by [employer] (which disclaims liability or warranty for this information). If you have questions about a medical condition or this instruction, always ask your healthcare professional. [place] [address] any warranty or liability for your use of this information.

## 2018-12-05 ENCOUNTER — PATIENT OUTREACH (OUTPATIENT)
Dept: FAMILY MEDICINE CLINIC | Age: 58
End: 2018-12-05

## 2018-12-05 NOTE — PROGRESS NOTES
ED Discharge Follow-Up    Date/Time:     2018 10:02 AM    Patient presented to [facility]  ED on 18 and was diagnosed with Low back pain without sciatica. Top Challenges reviewed with the provider   Low back pain without sciatica           Method of communication with provider :none    Nurse Navigator(NN) contacted the  patient  by telephone to perform post ED discharge assessment. Verified name and  with patient as identifiers. Provided introduction to self, and explanation of the Nurse Navigator role. Patient reported assessment: Patient stated that she continue     Medication(s):   New Medications at [address] (DELTASONE) 5 mg, predniSONE (DELTASONE) 20 mg, traMADol (ULTRAM) 50 mg  Changed Medications at Discharge: NA  Discontinued Medications at Discharge: NA    There were no barriers to obtaining medications identified at this time. Reviewed discharge instructions and red flags with  patient who voiced understanding. Patient given an opportunity to ask questions and does not have any further questions or concerns at this time. The patient agrees to contact the PCP office for questions related to their healthcare. Patient reminded that there are physicians on call 24 hours a day / 7 days a week (M-F 5pm to 8am and from Friday 5pm until Monday 8am for the weekend) should the patient have questions or concerns. NN provided contact information for future reference. Offered follow up appointment with PCP: yes   [facility] follow up appointment(s): No future appointments. Non-[facility] follow up appointment(s): Patient stated that she will also schedule appt with [facility]:  n/a    www. [facility] 9 AM- 9 PM.   Phone [phone]

## 2018-12-20 ENCOUNTER — PATIENT OUTREACH (OUTPATIENT)
Dept: FAMILY MEDICINE CLINIC | Age: 58
End: 2018-12-20

## 2018-12-20 ENCOUNTER — TELEPHONE (OUTPATIENT)
Dept: FAMILY MEDICINE CLINIC | Age: 58
End: 2018-12-20

## 2018-12-20 DIAGNOSIS — R53.83 FATIGUE, UNSPECIFIED TYPE: Primary | ICD-10-CM

## 2018-12-20 NOTE — PROGRESS NOTES
Follow up call placed to patient. Patient presented to [facility]  ED on 12/4/18 and was diagnosed with Low back pain without sciatica.      Patient reports that she continues to have back pain. Patient stated that she did not complete prednisone given at ER. Patient reports that prednisone was making her sick. Patient canceled follow up appt and stated that she will reschedule once she completed her labs.

## 2018-12-20 NOTE — TELEPHONE ENCOUNTER
Patient stated that she would like to have labs drawn, stated that she wants to have her TSH drawn today. Patient stated that she has been very tired and did not have TSH done last year when ordered.

## 2019-01-03 ENCOUNTER — PATIENT OUTREACH (OUTPATIENT)
Dept: FAMILY MEDICINE CLINIC | Age: 59
End: 2019-01-03

## 2019-01-03 NOTE — LETTER
1/3/2019 9:59 AM 
 
Ms. Yoni Ly [facility] [id number] Care management goals have been completed at this time. No further nurse navigator follow up scheduled. Pt has nurse navigator's contact information for any further questions, concerns, or needs. Patients upcoming visits:   
Future Appointments Date Time Provider Grace Montgomery 4/16/2019  1:30 PM [place] [id number] [facility] Sincerely, Shanon Medina LPN

## 2019-01-17 ENCOUNTER — OFFICE VISIT (OUTPATIENT)
Dept: FAMILY MEDICINE CLINIC | Age: 59
End: 2019-01-17

## 2019-01-17 VITALS
HEIGHT: 65 IN | SYSTOLIC BLOOD PRESSURE: 150 MMHG | RESPIRATION RATE: 16 BRPM | DIASTOLIC BLOOD PRESSURE: 84 MMHG | WEIGHT: 216 LBS | TEMPERATURE: 98.3 F | HEART RATE: 82 BPM | BODY MASS INDEX: 35.99 KG/M2

## 2019-01-17 DIAGNOSIS — R04.0 EPISTAXIS: ICD-10-CM

## 2019-01-17 DIAGNOSIS — J01.00 ACUTE NON-RECURRENT MAXILLARY SINUSITIS: Primary | ICD-10-CM

## 2019-01-17 RX ORDER — GUAIFENESIN AND PSEUDOEPHEDRINE HCL 1200; 120 MG/1; MG/1
1 TABLET, EXTENDED RELEASE ORAL 2 TIMES DAILY
COMMUNITY
End: 2019-10-02

## 2019-01-17 RX ORDER — AZITHROMYCIN 500 MG/1
500 TABLET, FILM COATED ORAL DAILY
Qty: 3 TAB | Refills: 0 | Status: SHIPPED | OUTPATIENT
Start: 2019-01-17 | End: 2019-01-20

## 2019-01-17 NOTE — PROGRESS NOTES
HISTORY OF PRESENT ILLNESS  Mike Chamberlain is a 66 y.o. female. Mike Chamberlain is here due to sinus symptoms for the past 6 days. They include congestion, non-productive cough, left sided epistaxis, and left sinus pain. She is getting worse and is particularly concerned about the bleeding. It was running out of her nose this am.  She has a history of recurrent sinusitis. Nothing makes it worse. Nasonex helps. Review of Systems   Constitutional: Negative for chills, fever and malaise/fatigue. HENT: Positive for congestion. Negative for ear pain and sore throat. Mucous is clear in color. There is sinus pain. Her throat is irritated. Eyes: Negative for discharge and redness. Tearing and eye itching due to her chronic allergies. Respiratory: Positive for cough. Negative for sputum production, shortness of breath and wheezing. Cardiovascular: Negative for chest pain. Musculoskeletal: Negative for myalgias. Neurological: Positive for headaches. Visit Vitals  /84 (BP 1 Location: Right arm, BP Patient Position: Sitting)   Pulse 82   Temp 98.3 °F (36.8 °C) (Oral)   Resp 16   Ht 5' 4.5\" (1.638 m)   Wt 216 lb (98 kg)   LMP 10/31/1986 Comment: Partial hysterectomy   BMI 36.50 kg/m²     BP Readings from Last 3 Encounters:   01/17/19 150/84   12/04/18 (!) 160/94   10/31/18 (!) 137/92     Physical Exam   Constitutional: She is oriented to person, place, and time. She appears well-developed and well-nourished. No distress. HENT:   Head: Normocephalic. Right Ear: Tympanic membrane, external ear and ear canal normal.   Left Ear: Tympanic membrane, external ear and ear canal normal.   Nose: No mucosal edema or rhinorrhea. No epistaxis. Right sinus exhibits no maxillary sinus tenderness and no frontal sinus tenderness. Left sinus exhibits maxillary sinus tenderness. Left sinus exhibits no frontal sinus tenderness.    Mouth/Throat: Uvula is midline, oropharynx is clear and moist and mucous membranes are normal.   Left nasal mucosa mildly irritated. Eyes: Right eye exhibits no discharge. Left eye exhibits no discharge. Right conjunctiva is not injected. Left conjunctiva is not injected. Cardiovascular: Normal rate, regular rhythm and normal heart sounds. Exam reveals no gallop and no friction rub. No murmur heard. Pulmonary/Chest: Effort normal and breath sounds normal. No respiratory distress. She has no wheezes. She has no rales. Lymphadenopathy:     She has no cervical adenopathy. Neurological: She is alert and oriented to person, place, and time. Skin: Skin is warm and dry. She is not diaphoretic. Nursing note and vitals reviewed. ASSESSMENT and PLAN    ICD-10-CM ICD-9-CM    1. Acute non-recurrent maxillary sinusitis J01.00 461.0 azithromycin (ZITHROMAX) 500 mg tab      PSEUDOEPHEDRINE-guaiFENesin (MUCINEX D MAXIMUM STRENGTH) Tb12 extended release tablet   2. Epistaxis R04.0 784.7         Zithromax  Rest, push fluids  Mucinex D prn  Self treatment for epistaxis discussed, information given    Follow-up Disposition:  Return if not better in 10 days. Reviewed plan of care. Patient has provided input and agrees with goals.

## 2019-01-17 NOTE — PROGRESS NOTES
Amberly Lee is a 66 y.o. female  Chief Complaint   Patient presents with    Epistaxis     x1wk, this morning left nostril bleeding.  Sore Throat     started yesterday    Cough     dry cough     1. Have you been to the ER, urgent care clinic since your last visit? Hospitalized since your last visit? Yes, [facility], back pain. 2. Have you seen or consulted any other health care providers outside of the [address] [place] [address] since your last visit? Include any pap smears or colon screening.  No    Health Maintenance Due   Topic Date Due    DTaP/Tdap/Td series (1 - Tdap) 04/18/1983    Shingrix Vaccine Age 50> (1 of 2) 04/18/2012    PAP AKA CERVICAL CYTOLOGY  07/15/2018    Influenza Age 9 to Adult  08/01/2018    FOBT Q 1 YEAR AGE 50-75  12/12/2018

## 2019-01-18 LAB — TSH SERPL DL<=0.005 MIU/L-ACNC: 1.28 UIU/ML (ref 0.45–4.5)

## 2019-03-14 DIAGNOSIS — J30.1 SEASONAL ALLERGIC RHINITIS DUE TO POLLEN: ICD-10-CM

## 2019-03-17 RX ORDER — MOMETASONE FUROATE 50 UG/1
2 SPRAY, METERED NASAL DAILY
Qty: 1 CONTAINER | Refills: 5 | Status: SHIPPED | OUTPATIENT
Start: 2019-03-17 | End: 2019-09-14 | Stop reason: SDUPTHER

## 2019-05-02 ENCOUNTER — TELEPHONE (OUTPATIENT)
Dept: FAMILY MEDICINE CLINIC | Age: 59
End: 2019-05-02

## 2019-05-02 NOTE — TELEPHONE ENCOUNTER
----- Message from Sallyann Apley sent at 5/1/2019  2:09 PM EDT -----  Regarding: Dr. Akhil Veloz  Pt is requesting a copy of referral to colon specialist to be picked up from the practice. Best contact [phone]; contact when available.

## 2019-05-02 NOTE — TELEPHONE ENCOUNTER
Called and spoke with pt, and she has been provided information for Dr. Elvia Damon to call and schedule appointment.

## 2019-05-08 ENCOUNTER — OFFICE VISIT (OUTPATIENT)
Dept: CARDIOLOGY CLINIC | Age: 59
End: 2019-05-08

## 2019-05-08 VITALS
WEIGHT: 212 LBS | DIASTOLIC BLOOD PRESSURE: 82 MMHG | RESPIRATION RATE: 16 BRPM | HEIGHT: 65 IN | HEART RATE: 76 BPM | SYSTOLIC BLOOD PRESSURE: 134 MMHG | BODY MASS INDEX: 35.32 KG/M2 | OXYGEN SATURATION: 98 %

## 2019-05-08 DIAGNOSIS — E66.01 SEVERE OBESITY (HCC): ICD-10-CM

## 2019-05-08 DIAGNOSIS — R07.9 CHEST PAIN, UNSPECIFIED TYPE: ICD-10-CM

## 2019-05-08 DIAGNOSIS — R00.2 PALPITATION: ICD-10-CM

## 2019-05-08 DIAGNOSIS — R94.31 ABNORMAL EKG: Primary | ICD-10-CM

## 2019-05-08 NOTE — PROGRESS NOTES
1. Have you been to the ER, urgent care clinic since your last visit? Hospitalized since your last visit? Yes When: 12/4/2018 Where: [facility] Reason for visit: Back Pain    2. Have you seen or consulted any other health care providers outside of the [address] [place] [address] since your last visit? Include any pap smears or colon screening. No     Pt reports Med Rec. Completed.     Chief Complaint   Patient presents with    Irregular Heart Beat     Visit Vitals  /82 (BP 1 Location: Right arm, BP Patient Position: Sitting)   Pulse 76   Resp 16   Ht 5' 4.5\" (1.638 m)   Wt 212 lb (96.2 kg)   SpO2 98%   BMI 35.83 kg/m²

## 2019-05-08 NOTE — PROGRESS NOTES
Office Follow-up    NAME: Starla Walker   :  1960  MRM:  [id number]    Date:  2019            Assessment:     Problem List  Date Reviewed: 2019          Codes Class Noted    Severe obesity ([facility] ICD-10-CM: E66.01  ICD-9-CM: 278.01  2019        RA (rheumatoid arthritis) ([facility] ICD-10-CM: M06.9  ICD-9-CM: 714.0  Unknown        Pure hypercholesterolemia ICD-10-CM: E78.00  ICD-9-CM: 272.0  2015        Family history of premature CAD ICD-10-CM: Z82.49  ICD-9-CM: V17.3  7/15/2015        Headache(784.0) ICD-10-CM: R51  ICD-9-CM: 784.0  2014        Cervicalgia ICD-10-CM: M54.2  ICD-9-CM: 723.1  2014        Migraine without aura, without mention of intractable migraine without mention of status migrainosus ICD-10-CM: G43.009  ICD-9-CM: 346.10  2014        GERD (gastroesophageal reflux disease) ICD-10-CM: K21.9  ICD-9-CM: 530.81  9/10/2014        Anxiety ICD-10-CM: F41.9  ICD-9-CM: 300.00  2014        Depression ICD-10-CM: F32.9  ICD-9-CM: 311  2014        Insomnia ICD-10-CM: G47.00  ICD-9-CM: 780.52  2014        Occipital neuralgia ICD-10-CM: M54.81  ICD-9-CM: 723.8  2014        Allergic rhinitis ICD-10-CM: J30.9  ICD-9-CM: 477.9  2014        Heart palpitations ICD-10-CM: R00.2  ICD-9-CM: 785.1  2014    Overview Signed 2014  5:49 PM by Meryle Ehrich, MD     Holter [facility]:   1558 Supraventricular ectopies  271 PVC, 12 couplets. 2 pauses 2.0 sec                      Plan:     1. Palpitations: In the past she was noted to have significant PACs and PVCs however most recent 30-day event monitor did not demonstrate any of those findings. Currently not on any medications. 2. Atypical chest pain: Recent stress test back in 2017 was negative for ischemia. Will check CAC for further r/o of CAD. 3. Fatigue: Unclear etiology. She seems to get enough sleep. She snores occasionally.   Does not have significant risk factors for sleep apnea. Stress test recently was negative for ischemia. LV function is normal.  Continue to observe. Increase activity. Continue to lose weight. 4. See Dr. Prema Reyes in 1 year. Subjective: Christina Chris, a 64y.o. year-old who presents for followup. She has known history of palpitations, fatigue, chest pain which has been atypical in the past.  She is returning for follow-up. In past she was seen by Dr. Joanna Seo for couple years and I had seen her again back last year in May 2018. She underwent a Holter monitor at that time in our office because of symptoms of palpitations. Holter monitor showed mostly normal findings with very rare PVC. Of note in 2013 she had a Holter monitor when she was in [facility] and at this time it demonstrated 1500 supraventricular complexes, 271 PV, 2 episodes of 2-second pauses. None of these were seen on the most recent 30-day event monitor. She continues to have palpitations and what she describes as chest heaviness. Stress test in December 2017 demonstrated normal perfusion and normal EKG. EKG today in our office demonstrated normal sinus rhythm with inferior T wave inversion. These are unchanged from the previous EKG of May 4, 2018.     Exam:     Physical Exam:  Visit Vitals  /82 (BP 1 Location: Right arm, BP Patient Position: Sitting)   Pulse 76   Resp 16   Ht 5' 4.5\" (1.638 m)   Wt 212 lb (96.2 kg)   LMP 10/31/1986 Comment: Partial hysterectomy   SpO2 98%   BMI 35.83 kg/m²     General appearance - alert, well appearing, and in no distress  Mental status - affect appropriate to mood  Eyes - sclera anicteric, moist mucous membranes  Neck - supple, no significant adenopathy  Chest - clear to auscultation, no wheezes, rales or rhonchi  Heart - normal rate, regular rhythm, normal S1, S2, no murmurs, rubs, clicks or gallops  Abdomen - soft, nontender, nondistended, no masses or organomegaly  Extremities - peripheral pulses normal, no pedal edema  Skin - normal coloration  no rashes    Medications:     Current Outpatient Medications   Medication Sig    mometasone (NASONEX) 50 mcg/actuation nasal spray 2 Sprays by Both Nostrils route daily. For allergy symptoms    predniSONE (DELTASONE) 5 mg tablet Take 1 Tab by mouth daily.  esomeprazole (NEXIUM) 40 mg capsule TAKE 1 CAPSULE BY MOUTH EVERY DAY    cetirizine (ZYRTEC) 10 mg tablet Take 1 Tab by mouth daily. For allergy    aspirin delayed-release 81 mg tablet Take  by mouth daily.  raNITIdine (ZANTAC) 300 mg tablet Take 1 Tab by mouth daily.  methotrexate (RHEUMATREX) 2.5 mg tablet every Tuesday.  folic acid (FOLVITE) 1 mg tablet TAKE 1 TABLET BY MOUTH ONCE DAILY    PATADAY 0.2 % drop ophthalmic solution Apply 1 Drop to eye daily.  ascorbic acid (VITAMIN C) 500 mg tablet Take  by mouth daily.  Cholecalciferol, Vitamin D3, 2,000 unit cap Take  by mouth daily.  PSEUDOEPHEDRINE-guaiFENesin (MUCINEX D MAXIMUM STRENGTH) Tb12 extended release tablet Take 1 Tab by mouth two (2) times a day. No current facility-administered medications for this visit.        Diagnostic Data Review:     EKG: Sinus rhythm with T inversion in inferior leads unchanged from EKG of Dec 2017    5/15/18-5/21/18: LOOP- Sinus w/ PACs    12/6/17: ECHO- EF 50-55%; Mild TR; Trace SD    6/4/14: ECHO- EF 55%, mild TR      Lab Review:     Lab Results   Component Value Date/Time    Cholesterol, Total 157 01/27/2017 03:48 PM     Lab Results   Component Value Date/Time    Creatinine (POC) 0.9 08/06/2015 11:09 AM    Creatinine 0.91 10/20/2017 02:35 PM     Lab Results   Component Value Date/Time    BUN 15 10/20/2017 02:35 PM    BUN (POC) 6 (L) 08/06/2015 11:09 AM     Lab Results   Component Value Date/Time    Potassium 4.5 10/20/2017 02:35 PM     No results found for: HBA1C, HGBE8, EFR1DIQL, DAD5UVXN  Lab Results   Component Value Date/Time    Hemoglobin (POC) 11.2 (L) 08/06/2015 11:09 AM    HGB 12.3 10/20/2017 02:35 PM     Lab Results   Component Value Date/Time    PLATELET 608 91/23/3958 02:35 PM     No results for input(s): CPK, CKMB, TROIQ in the last 72 hours. No lab exists for component: CKROSEANN, CPBARRERAB             ___________________________________________________    Sepideh Mcgrath.  Bandar Miller MD, [facility]

## 2019-05-08 NOTE — PATIENT INSTRUCTIONS
Coronary Calcium Score- Chest pain. Echo- Chest pain, Abnormal EKG. See Dr. Tommy Rosenthal in 1 year.

## 2019-05-17 ENCOUNTER — HOSPITAL ENCOUNTER (OUTPATIENT)
Dept: CT IMAGING | Age: 59
Discharge: HOME | End: 2019-05-17
Payer: SELF-PAY

## 2019-05-17 DIAGNOSIS — Z29.9 PREVENTIVE MEASURE: ICD-10-CM

## 2019-05-17 PROCEDURE — 75571 CT HRT W/O DYE W/CA TEST: CPT

## 2019-06-10 ENCOUNTER — TELEPHONE (OUTPATIENT)
Dept: CARDIOLOGY CLINIC | Age: 59
End: 2019-06-10

## 2019-06-10 NOTE — TELEPHONE ENCOUNTER
Call placed to discuss echo results with pt per VO of Dr. Any Tobias. Advised of stable results. Continue current medication regimen. Follow up as instructed. Pt expressed understanding.

## 2019-06-10 NOTE — TELEPHONE ENCOUNTER
----- Message from Shannon Bill MD sent at 6/5/2019  9:49 AM EDT -----  Pls notify>> no change from prior study.

## 2019-07-18 LAB — CREATININE, EXTERNAL: 0.92

## 2019-08-21 ENCOUNTER — HOSPITAL ENCOUNTER (OUTPATIENT)
Dept: MAMMOGRAPHY | Age: 59
Discharge: HOME | End: 2019-08-21
Attending: FAMILY MEDICINE
Payer: MEDICARE

## 2019-08-21 DIAGNOSIS — Z12.39 BREAST SCREENING, UNSPECIFIED: ICD-10-CM

## 2019-08-21 PROCEDURE — 77063 BREAST TOMOSYNTHESIS BI: CPT

## 2019-09-07 DIAGNOSIS — J30.1 SEASONAL ALLERGIC RHINITIS DUE TO POLLEN: ICD-10-CM

## 2019-09-09 RX ORDER — CETIRIZINE HCL 10 MG
10 TABLET ORAL DAILY
Qty: 30 TAB | Refills: 11 | Status: SHIPPED | OUTPATIENT
Start: 2019-09-09 | End: 2021-01-13 | Stop reason: SDUPTHER

## 2019-09-14 DIAGNOSIS — J30.1 SEASONAL ALLERGIC RHINITIS DUE TO POLLEN: ICD-10-CM

## 2019-09-14 RX ORDER — MOMETASONE FUROATE 50 UG/1
2 SPRAY, METERED NASAL DAILY
Qty: 3 CONTAINER | Refills: 3 | Status: SHIPPED | OUTPATIENT
Start: 2019-09-14

## 2019-10-02 ENCOUNTER — OFFICE VISIT (OUTPATIENT)
Dept: INTERNAL MEDICINE CLINIC | Age: 59
End: 2019-10-02

## 2019-10-02 VITALS
HEIGHT: 64 IN | RESPIRATION RATE: 16 BRPM | TEMPERATURE: 98.5 F | WEIGHT: 211 LBS | HEART RATE: 80 BPM | DIASTOLIC BLOOD PRESSURE: 78 MMHG | SYSTOLIC BLOOD PRESSURE: 112 MMHG | OXYGEN SATURATION: 98 % | BODY MASS INDEX: 36.02 KG/M2

## 2019-10-02 DIAGNOSIS — J30.89 NON-SEASONAL ALLERGIC RHINITIS, UNSPECIFIED TRIGGER: ICD-10-CM

## 2019-10-02 DIAGNOSIS — M06.9 RHEUMATOID ARTHRITIS INVOLVING MULTIPLE SITES, UNSPECIFIED RHEUMATOID FACTOR PRESENCE: Primary | ICD-10-CM

## 2019-10-02 DIAGNOSIS — F41.9 ANXIETY: ICD-10-CM

## 2019-10-02 DIAGNOSIS — F33.42 RECURRENT MAJOR DEPRESSIVE DISORDER, IN FULL REMISSION (HCC): ICD-10-CM

## 2019-10-02 DIAGNOSIS — K21.9 GASTROESOPHAGEAL REFLUX DISEASE WITHOUT ESOPHAGITIS: ICD-10-CM

## 2019-10-02 DIAGNOSIS — M54.81 BILATERAL OCCIPITAL NEURALGIA: ICD-10-CM

## 2019-10-02 DIAGNOSIS — E66.01 SEVERE OBESITY (HCC): ICD-10-CM

## 2019-10-02 NOTE — PROGRESS NOTES
Amanda Espana is a 64y.o. year old female who is a new patient to me today (10/02/19). She was previous followed by Dr Baldemar Oconnell. Her daughter is a patient of mine (Holger Antoine). Assessment & Plan:     Diagnoses and all orders for this visit:    1. Rheumatoid arthritis involving multiple sites, unspecified rheumatoid factor presence ([facility]- new diagnosis to me, chronic problem, well controlled, continue current treatment as outlined by specialist      2. Severe obesity ([facility]- new diagnosis to me, chronic problem, has been stable for the last few years. I have recommended the following interventions: encourage exercise and lifestyle education regarding diet. 3. Non-seasonal allergic rhinitis, unspecified trigger- new diagnosis to me, chronic problem, well controlled, continue current treatment      4. Recurrent major depressive disorder, in full remission ([facility]- new dx to me, chronic problem, she reports well controlled, has been on multpile medications in the past w/o success, reviewed treatment options with her, reviewed life style changes to help improve mood, reviewed role of daily vs prn meds, no changes, reviewed when to consider trial of meds. 5. Anxiety- new dx to me, chronic issue, well controlled, see above    6. Gastroesophageal reflux disease without esophagitis- new diagnosis to me, chronic problem, well controlled, continue current treatment      7. Bilateral occipital neuralgia- chronic issue, on/off, reviewed when to discuss seeing specialist.      She reports being told recently that she only had a partial hysterectomy and has a cervix. Chart reviewed, seen by OBGYN on 6/30/14 and then previous PCP on 7/15/15. Pelvic exams done on both visits and documentation is that her cervix is absent. Follow-up and Dispositions    · Return in about 6 months (around 4/2/2020) for FULL PHYSICAL - 30 minutes. Subjective:    Sandra Muir was seen today for Establish Care    Rheumatology & Orthopedic Review  Pt has RA. Followed by Dr Karuna Han. She reports diagnosed 2-3 yrs ago. Her last visit was in July. She is happy with medications. GI Review  She has a history of GERD. She reports having a recent EGD for dilation, 10/13/18. She denies: abdominal bloating, heartburn, midepigastric pain and nausea. She has identified the following triggers: spicy foods. Medical therapy currently involves Nexium. Allergies  She has Allergic Rhinitis that be consistant throughout the year. Current symptoms: sinus pressure/congestion. No obvious triggers. She reports: taking medications as instructed, no medication side effects noted. The following sections were reviewed & updated as appropriate: PMH, PL, PSH, FH, and SH. Patient Active Problem List   Diagnosis Code    Heart palpitations R00.2    Occipital neuralgia M54.81    Allergic rhinitis J30.9    Anxiety F41.9    Depression F32.9    Insomnia G47.00    GERD (gastroesophageal reflux disease) K21.9    Headache(784.0) R51    Cervicalgia M54.2    Migraine without aura, without mention of intractable migraine without mention of status migrainosus G43.009    Family history of premature CAD Z80.55    Pure hypercholesterolemia E78.00    RA (rheumatoid arthritis) ([facility] M06.9    Severe obesity ([facility] E66.01          Prior to Admission medications    Medication Sig Start Date End Date Taking? Authorizing Provider   mometasone (NASONEX) 50 mcg/actuation nasal spray 2 SPRAYS BY BOTH NOSTRILS ROUTE DAILY. FOR ALLERGY SYMPTOMS 9/14/19  Yes Zeb Mendosa MD   cetirizine (ZYRTEC) 10 mg tablet TAKE 1 TAB BY MOUTH DAILY. FOR ALLERGY 9/9/19  Yes Zeb Mendosa MD   predniSONE (DELTASONE) 5 mg tablet Take 1 Tab by mouth daily. 12/4/18  Yes Tristan Gaspar MD   esomeprazole (NEXIUM) 40 mg capsule TAKE 1 CAPSULE BY MOUTH EVERY DAY 10/11/18  Yes Provider, Historical   aspirin delayed-release 81 mg tablet Take  by mouth daily.    Yes Provider, Historical   methotrexate (RHEUMATREX) 2.5 mg tablet every Tuesday. 4/21/17  Yes Provider, Historical   folic acid (FOLVITE) 1 mg tablet TAKE 1 TABLET BY MOUTH ONCE DAILY 3/26/17  Yes Provider, Historical   PATADAY 0.2 % drop ophthalmic solution Apply 1 Drop to eye daily. 10/23/16  Yes Provider, Historical   ascorbic acid (VITAMIN C) 500 mg tablet Take  by mouth daily. Yes Provider, Historical   Cholecalciferol, Vitamin D3, 2,000 unit cap Take  by mouth daily. Yes Provider, Historical   PSEUDOEPHEDRINE-guaiFENesin (MUCINEX D MAXIMUM STRENGTH) Tb12 extended release tablet Take 1 Tab by mouth two (2) times a day. 10/2/19  Cande Daley MD   raNITIdine (ZANTAC) 300 mg tablet Take 1 Tab by mouth daily. 11/18/17 10/2/19  Cande Daley MD          Allergies   Allergen Reactions    Percocet [Oxycodone-Acetaminophen] Other (comments)     Slows heart rate. Review of Systems   Constitutional: Negative for malaise/fatigue and weight loss. HENT: Positive for congestion. Respiratory: Negative for cough and shortness of breath. Cardiovascular: Negative for chest pain and palpitations. Gastrointestinal: Negative for abdominal pain, constipation, diarrhea, heartburn, nausea and vomiting. Neurological: Positive for headaches (migraines & h/o occipital neuralgia). Psychiatric/Behavioral: Negative for depression. The patient is not nervous/anxious and does not have insomnia. Objective:   Physical Exam   Constitutional: She appears well-developed. No distress. obese   HENT:   Mouth/Throat: Mucous membranes are normal.   Eyes: Conjunctivae and lids are normal. No scleral icterus. Neck: Neck supple. No thyromegaly present. Cardiovascular: Regular rhythm and normal heart sounds. No murmur heard. Pulmonary/Chest: Effort normal and breath sounds normal. She has no wheezes. She has no rales. Abdominal: Soft. Bowel sounds are normal. She exhibits no mass.  There is no hepatosplenomegaly. There is no tenderness. Musculoskeletal: She exhibits no edema. Lymphadenopathy:     She has no cervical adenopathy. Skin: No rash noted. Psychiatric: She has a normal mood and affect. Her behavior is normal.          Visit Vitals  /78   Pulse 80   Temp 98.5 °F (36.9 °C) (Oral)   Resp 16   Ht 5' 4\" (1.626 m)   Wt 211 lb (95.7 kg)   LMP 10/31/1986 Comment: Partial hysterectomy   SpO2 98%   BMI 36.22 kg/m²         Disclaimer:  Advised her to call back or return to office if symptoms worsen/change/persist.  Discussed expected course/resolution/complications of diagnosis in detail with patient. Medication risks/benefits/costs/interactions/alternatives discussed with patient. She was given an after visit summary which includes diagnoses, current medications, & vitals. She expressed understanding with the diagnosis and plan. Aspects of this note may have been generated using voice recognition software. Despite editing, there may be some syntax errors.        Kerri Homans, MD

## 2019-12-16 ENCOUNTER — OFFICE VISIT (OUTPATIENT)
Dept: NEUROLOGY | Age: 59
End: 2019-12-16

## 2019-12-16 VITALS
BODY MASS INDEX: 36.88 KG/M2 | HEIGHT: 64 IN | RESPIRATION RATE: 18 BRPM | WEIGHT: 216 LBS | HEART RATE: 81 BPM | SYSTOLIC BLOOD PRESSURE: 144 MMHG | OXYGEN SATURATION: 98 % | DIASTOLIC BLOOD PRESSURE: 86 MMHG

## 2019-12-16 DIAGNOSIS — M54.81 OCCIPITAL NEURALGIA OF LEFT SIDE: Primary | ICD-10-CM

## 2019-12-16 DIAGNOSIS — R51.9 NEW ONSET HEADACHE: ICD-10-CM

## 2019-12-16 DIAGNOSIS — R42 DIZZY: Primary | ICD-10-CM

## 2019-12-16 DIAGNOSIS — R42 DIZZY: ICD-10-CM

## 2019-12-16 DIAGNOSIS — R51.9 WORST HEADACHE OF LIFE: ICD-10-CM

## 2019-12-16 RX ORDER — OLOPATADINE HYDROCHLORIDE 2 MG/ML
1 SOLUTION/ DROPS OPHTHALMIC DAILY
COMMUNITY

## 2019-12-16 RX ORDER — ACETAMINOPHEN 500 MG
TABLET ORAL 2 TIMES DAILY
COMMUNITY

## 2019-12-16 RX ORDER — OXCARBAZEPINE 300 MG/1
300 TABLET, FILM COATED ORAL 2 TIMES DAILY
Qty: 60 TAB | Refills: 2 | Status: SHIPPED | OUTPATIENT
Start: 2019-12-16 | End: 2020-04-20 | Stop reason: SDUPTHER

## 2019-12-16 RX ORDER — ASPIRIN 81 MG/1
TABLET ORAL DAILY
COMMUNITY

## 2019-12-16 RX ORDER — HYDROGEN PEROXIDE 3 %
SOLUTION, NON-ORAL MISCELLANEOUS DAILY
COMMUNITY
End: 2020-06-26

## 2019-12-16 NOTE — PROGRESS NOTES
[facility] and [address] at [facility] at [facility] [address], [address] [facility] [address] [facility], [address] [place] [address]  [phone] Office  [phone] Facsimile           Referring: Dr. Edward Pena    Chief Complaint   Patient presents with    New Patient    Head Pain     occipital neuralgia ref by Dr. Edward Pena     68-year-old right-handed woman who comes today for evaluation what she calls constant pain and numbness in her head as well as severe ear pain and redness of the left eye. She says she is always had headaches. She has been diagnosed with migraine. She is been diagnosed with occipital neuralgia. In any regard she had the worst headache she is ever had of her life and this was new for her. Never had anything like this. Said that she had burning pain in her ear and along the occipital region of the head. It was intense. She used ice packs. Nothing made it better or worse. She says that she had a redness in the left eye associated. No diplopia. No loss of vision. She did see her eye doctor and that was fine. She saw Dr. Edward Pena because she thought it was sinus and he checked her out and said things were fine. Continues to have this headache. It is burning in description. Radiates from the left occiput forward. Never had anything like this before. No focal deficits. Poor historian. She has had CTs of the head in the past.  Those been negative. No MRI. She has episodes of dizzy spells where she denies vertigo but says she needs to sit down. Has not passed out. No palpitations. No chest pain. No shortness of breath. Typical headache she say have been ongoing since her 25s. She has headaches that are between her eyes and in the top of the head. Stabbing and throbbing. Has photophobia nausea and worsens with movement. She is tried Relpax.   Been on topiramate Lexapro and amitriptyline in the past.  This headache she had recently was unlike anything she is ever had before. The electronic medical record finds she saw Dr. Bonnie Woody for 1 visit back in 2014 and at that time she was complaining of headache and neck pain. He treated this is vascular headache and prescribed Topamax as well as Imitrex. He maintained her Zanaflex. He ordered cervical x-rays and a sedimentation rate. She was to follow-up in about 5 weeks. She has not been seen until today. MRI of the brain back in June 2014 unremarkable. Cervical spine x-rays from November 2014 with multilevel degenerative spondylosis particularly C4-C5. CT of the cervical spine performed in August 2015 is reported out as normal.  Past Medical History:   Diagnosis Date    Acute meniscal tear of knee 08/15/2017    Dr. Armando Prather    Acute torn meniscus of knee     Right knee    Allergic rhinitis 6/16/2014    Anxiety 7/14/2014    Arthritis     Bone spur     Left shoulder    Dense breast tissue on mammogram     Depression 7/14/2014    Family history of premature CAD 7/15/2015    GERD (gastroesophageal reflux disease) 9/10/2014    Headache     Hx of mammogram April 2014    WNL per pt.  Hyperlipidemia 7/28/2015    Insomnia 7/14/2014    Neuralgia     Occipital neuralgia    Occipital neuralgia 6/16/2014    RA (rheumatoid arthritis) ([facility]     Screening for malignant neoplasm of the cervix Approx. 7 yrs ago    Negative per pt.        Past Surgical History:   Procedure Laterality Date    HX CHOLECYSTECTOMY  1986    HX ENDOSCOPY  2019    esophageal dilation with 20mm sized balloon    HX KNEE ARTHROSCOPY Right 04/16/2014    HX PELVIC LAPAROSCOPY  2001    Laproscopy of abdomen - removal of scar tissue    HX SHOULDER ARTHROSCOPY Left 06/2013    HX SHOULDER ARTHROSCOPY Left 03/2013    Shaved bone spur     HX TOTAL ABDOMINAL HYSTERECTOMY  1999    ? had partial hysterectomy       Current Outpatient Medications   Medication Sig Dispense Refill    esomeprazole (NEXIUM) 20 mg capsule Take  by mouth daily.  aspirin delayed-release 81 mg tablet Take  by mouth daily.  olopatadine (PATADAY) 0.2 % drop ophthalmic solution Administer 1 Drop to both eyes daily.  cholecalciferol (VITAMIN D3) (2,000 UNITS /50 MCG) cap capsule Take  by mouth two (2) times a day.  mometasone (NASONEX) 50 mcg/actuation nasal spray 2 SPRAYS BY BOTH NOSTRILS ROUTE DAILY. FOR ALLERGY SYMPTOMS 3 Container 3    cetirizine (ZYRTEC) 10 mg tablet TAKE 1 TAB BY MOUTH DAILY. FOR ALLERGY 30 Tab 11    predniSONE (DELTASONE) 5 mg tablet Take 1 Tab by mouth daily. 7 Tab 0    methotrexate (RHEUMATREX) 2.5 mg tablet every Tuesday.  folic acid (FOLVITE) 1 mg tablet TAKE 1 TABLET BY MOUTH ONCE DAILY  0    ascorbic acid (VITAMIN C) 500 mg tablet Take  by mouth daily. Allergies   Allergen Reactions    Percocet [Oxycodone-Acetaminophen] Other (comments)     Slows heart rate. Social History     Tobacco Use    Smoking status: Never Smoker    Smokeless tobacco: Never Used   Substance Use Topics    Alcohol use:  Yes     Alcohol/week: 5.0 standard drinks     Types: 5 Cans of beer per week     Frequency: 4 or more times a week     Drinks per session: 1 or 2     Binge frequency: Never     Comment: Beer    Drug use: No       Family History   Problem Relation Age of Onset    Diabetes Mother     Cancer Mother         cervical    Heart Disease Father 80        MI    Cancer Father         prostate    Cancer Sister         breast    Breast Cancer Sister     Heart Disease Brother 62        MI    Cancer Sister         breast    Breast Cancer Sister     Diabetes Sister     Cancer Sister         breast    Cancer Sister         lung    Diabetes Sister     Hypertension Sister     No Known Problems Sister     Cancer Brother         leukemia    Hypertension Brother     Other Brother         prediabetic    Heart Disease Brother     Thyroid Disease Daughter     No Known Problems Daughter     No Known Problems Son     Heart Disease Paternal Grandmother     Heart Disease Sister         MI     Review of Systems  Pertinent positives and negatives as noted with remainder of comprehensive review negative    Examination  Visit Vitals  /86 (BP 1 Location: Left arm, BP Patient Position: Sitting)   Pulse 81   Resp 18   Ht 5' 4\" (1.626 m)   Wt 98 kg (216 lb)   LMP 10/31/1986 Comment: Partial hysterectomy   SpO2 98%   BMI 37.08 kg/m²     Pleasant, well appearing. Dress and grooming are appropriate. No scleral icterus is present. Oropharynx is clear. Supple neck without bruit appreciated. Heart regular. Pulses are symmetrical.  No edema in the lower extremities. Neurologically, she is awake, alert, and oriented with normal speech and language. Her cognition is normal. Intact cranial nerves 2-12. No nystagmus. Visual fields full to confrontation. Disk margins are flat bilaterally. She has normal bulk and tone. She has no abnormal movement. She has no pronation or drift. She generates full strength in the upper and lower extremities to direct confrontational testing. Reflexes are symmetrical in the upper and lower extremities bilaterally. No pathologic reflexes are elicited. Finger nose finger and rapid alternating movements are normal.  Steady gait. No sensory deficit to primary modalities. Impression/Plan  79-year-old lady with underlying headache now with new onset of what she described as the worst headache she is ever had in her life and examination consistent with occipital neuralgia left-sided. Discussed this. Given the dizziness as well as the changes in the left eye as well as this new onset headache again that she describes worse of her life going get an MRI of the brain. Get EEG carotid Doppler visual potential as well. Discussed symptomatic treatment including Trileptal and we used 300 mg twice daily.   We discussed the approved indication, potential side effects, potential benefits and alternatives. Follow-up after her tests. Jojo Gupta MD    This note was created using voice recognition software. Despite editing, there may be syntax errors. This note will not be viewable in [address]

## 2019-12-16 NOTE — PATIENT INSTRUCTIONS
RESULT POLICY      If we have ordered testing for you, know that; \"NO NEWS IS GOOD NEWS! \"     It is our policy that we know longer call patients with results, nor do we  give test results over the phone. We schedule follow up appointments so that your results can be discussed in person. This allows you to address any questions you have regarding the results. If you choose to go to an imaging center outside of [facility], it is your responsibility to bring imaging report and disc to follow up appointment. If something of concern is revealed on your test, we will contact you to discuss the matter and if needed schedule a sooner follow up appointment. Additionally, results may be found by using the My Chart feature and one of our patient service representatives at the  can give you instructions on how to access this feature to utilize our electronic medical record system. Thank you for your understanding. 10 [facility]   Statement to Patients  April 1, 2014      In an effort to ensure the large volume of patient prescription refills is processed in the most efficient and expeditious manner, we are asking our patients to assist us by calling your Pharmacy for all prescription refills, this will include also your  Mail Order Pharmacy. The pharmacy will contact our office electronically to continue the refill process. Please do not wait until the last minute to call your pharmacy. We need at least 48 hours (2days) to fill prescriptions. We also encourage you to call your pharmacy before going to  your prescription to make sure it is ready. With regard to controlled substance prescription refill requests (narcotic refills) that need to be picked up at our office, we ask your cooperation by providing us with at least 72 hours (3days) notice that you will need a refill.     We will not refill narcotic prescription refill requests after 4:00pm on any weekday, Monday through Thursday, or after 2:00pm on Fridays, or on the weekends. We encourage everyone to explore another way of getting your prescription refill request processed using [employer], our patient web portal through our electronic medical record system. [employer] is an efficient and effective way to communicate your medication request directly to the office and  downloadable as an charlene on your smart phone . [employer] also features a review functionality that allows you to view your medication list as well as leave messages for your physician. Are you ready to get connected? If so please review the attatched instructions or speak to any of our staff to get you set up right away! Thank you so much for your cooperation. Should you have any questions please contact our Practice Administrator.

## 2019-12-16 NOTE — PROCEDURES
EEG:      Date:  12/16/19    Requesting Physician:  Bossman Cooper. MD Allen    An EEG is requested in this 64year old woman with paroxysms of dizziness to evaluate for epileptiform abnormality. Medications:  Medications are listed as Nexium, Nasonex, Zyrtec, prednisone, Rheumatrex. This tracing is obtained during the awake state. During wakefulness the background consists of diffuse low voltage, fast frequency, beta wave activity. Hyperventilation is not performed. Intermittent photic stimulation induces symmetric posterior driving responses. Sleep is not attained. Interpretation: This EEG recorded during the awake state is normal.  No epileptiform abnormalities are seen.

## 2019-12-16 NOTE — PROGRESS NOTES
Chief Complaint   Patient presents with    New Patient    Head Pain     occipital neuralgia ref by Dr. Bunch Cancer     Migraine/HA burning in back of head, eyes, ear pain, d/x'd with occipital neuralgia previously.   D/x'd with migraines at 22 y/o    Frequency - was intermittent, now daily for the past 2 weeks  Duration - all day  Characteristics - Throbbing, pulsating, stabbing, sharp, dull/nagging, btw eyes, behind left ear, top of head, neck,   Positive sxs - light, nausea, worsening with movement  Triggers - anxiety/worry, intense odors  Relieving factors - Dark, quiet, rest, cold/hot compress  Medications used -    Preventative - topamax, amitriptyline, lexapro   Abortive - relpax

## 2019-12-27 ENCOUNTER — TELEPHONE (OUTPATIENT)
Dept: NEUROLOGY | Age: 59
End: 2019-12-27

## 2019-12-27 ENCOUNTER — HOSPITAL ENCOUNTER (OUTPATIENT)
Dept: MRI IMAGING | Age: 59
Discharge: HOME | End: 2019-12-27
Attending: PSYCHIATRY & NEUROLOGY
Payer: MEDICARE

## 2019-12-27 VITALS — BODY MASS INDEX: 37.76 KG/M2 | WEIGHT: 220 LBS

## 2019-12-27 DIAGNOSIS — R42 DIZZY: ICD-10-CM

## 2019-12-27 DIAGNOSIS — M54.81 OCCIPITAL NEURALGIA OF LEFT SIDE: ICD-10-CM

## 2019-12-27 DIAGNOSIS — F40.240 CLAUSTROPHOBIA: Primary | ICD-10-CM

## 2019-12-27 DIAGNOSIS — R51.9 WORST HEADACHE OF LIFE: ICD-10-CM

## 2019-12-27 DIAGNOSIS — R51.9 NEW ONSET HEADACHE: ICD-10-CM

## 2019-12-27 PROCEDURE — 70553 MRI BRAIN STEM W/O & W/DYE: CPT

## 2019-12-27 PROCEDURE — 74011250636 HC RX REV CODE- 250/636: Performed by: RADIOLOGY

## 2019-12-27 PROCEDURE — A9575 INJ GADOTERATE MEGLUMI 0.1ML: HCPCS | Performed by: RADIOLOGY

## 2019-12-27 RX ORDER — DIAZEPAM 10 MG/1
TABLET ORAL
Qty: 1 TAB | Refills: 0 | OUTPATIENT
Start: 2019-12-27 | End: 2020-01-16 | Stop reason: ALTCHOICE

## 2019-12-27 RX ORDER — GADOTERATE MEGLUMINE 376.9 MG/ML
20 INJECTION INTRAVENOUS
Status: COMPLETED | OUTPATIENT
Start: 2019-12-27 | End: 2019-12-27

## 2019-12-27 RX ADMIN — GADOTERATE MEGLUMINE 20 ML: 376.9 INJECTION INTRAVENOUS at 15:31

## 2019-12-27 NOTE — TELEPHONE ENCOUNTER
S/w Dr. Ann Marie Cage, gave VO to call in diazepam 10 mg one tab PO prior to MRI for one dose. Called in to [facility], pt notified.

## 2019-12-27 NOTE — TELEPHONE ENCOUNTER
----- Message from Wilfredo Mcintosh sent at 12/27/2019  9:45 AM EST -----  Regarding: Dr Villa/rx   Medication Refill    Caller (if not patient):      Relationship of caller (if not patient):      Best contact number(s): [phone]      Name of medication and dosage if known: Ativan      Is patient out of this medication (yes/no):yes      Pharmacy name: [id number] listed in chart? (yes/no):yes  Pharmacy phone number:      Details to clarify the request:  Pt said she is having a MRI at [facility] at 2:00pm and wanted to know if she can have something prescribed for her to get her thru the  MRI, who ever covering for Dr Neil Delgado can call something in. Please call back as soon as possible to see if this can be done.     Wilfredo Mcintosh

## 2020-01-02 ENCOUNTER — OFFICE VISIT (OUTPATIENT)
Dept: NEUROLOGY | Age: 60
End: 2020-01-02

## 2020-01-02 DIAGNOSIS — H53.9 VISUAL DISTURBANCE: Primary | ICD-10-CM

## 2020-01-02 NOTE — PROCEDURES
[facility] [place]   Visual Evoked Potential Report    Date of Service: 1/2/2020  Referring: Dr. Navi Kenny  Indication: Filemon Sadler    Bilateral full field pattern reversal visual evoked potential is performed. Waveforms are appropriate for interpretation. Absolute latency of the P100 is normal bilaterally.     Normal Study    Ron Bloch, MD

## 2020-01-02 NOTE — PROGRESS NOTES
EMG/ NCS Report  [facility], [address]  Ph: [phone]/ [phone]  FAX: [phone]/ [phone]  Test Date:  2020    Patient: Omar Rizzo : 1960 Physician: Nella Vu   Sex: Female Height: ' \" Ref Phys: Ravindra Covington MD   ID#: [id number] Weight:  lbs. Technician: John Paul Wolf     Patient History / Exam:  CC:DIZZY/OCCIPITAL NEURALGIA LT. SIDE          EMG & NCV Findings:    Impression:        ___________________________  KIZZY WHITTAKER MD              VEP     Trace N75 (ms) P100 (ms) N145 (ms) M11-L956 (µV)   Norm  <117     *L : O1-Cz 69.9 94.9 119.9 5.75   *R : O1-Cz 69.9 96.9 119.9 4.89   L-R Norm  <7     L-R 0.0 2.0 0.0 0.86   *A 92 millisecond delay was used for the monitor.                Waveforms:

## 2020-01-16 ENCOUNTER — OFFICE VISIT (OUTPATIENT)
Dept: NEUROLOGY | Age: 60
End: 2020-01-16

## 2020-01-16 VITALS
BODY MASS INDEX: 37.56 KG/M2 | WEIGHT: 220.02 LBS | RESPIRATION RATE: 16 BRPM | HEIGHT: 64 IN | HEART RATE: 84 BPM | DIASTOLIC BLOOD PRESSURE: 88 MMHG | OXYGEN SATURATION: 98 % | SYSTOLIC BLOOD PRESSURE: 136 MMHG

## 2020-01-16 DIAGNOSIS — M54.81 OCCIPITAL NEURALGIA OF LEFT SIDE: Primary | ICD-10-CM

## 2020-01-16 RX ORDER — FLUTICASONE PROPIONATE 50 MCG
2 SPRAY, SUSPENSION (ML) NASAL DAILY
COMMUNITY
End: 2020-06-26

## 2020-01-16 NOTE — PROGRESS NOTES
[facility] and [address] at [facility] at [address] [facility], [address] [facility] [address] [facility], [address] [place] [address]   [phone]              Chief Complaint   Patient presents with    Results     vep, dop, eeg, mri, still has \"feeling behind left eye at times\"     Current Outpatient Medications   Medication Sig Dispense Refill    fluticasone propionate (FLONASE ALLERGY RELIEF) 50 mcg/actuation nasal spray 2 Sprays by Both Nostrils route daily.  esomeprazole (NEXIUM) 20 mg capsule Take  by mouth daily.  aspirin delayed-release 81 mg tablet Take  by mouth daily.  olopatadine (PATADAY) 0.2 % drop ophthalmic solution Administer 1 Drop to both eyes daily.  cholecalciferol (VITAMIN D3) (2,000 UNITS /50 MCG) cap capsule Take  by mouth two (2) times a day.  OXcarbazepine (TRILEPTAL) 300 mg tablet Take 1 Tab by mouth two (2) times a day. 60 Tab 2    cetirizine (ZYRTEC) 10 mg tablet TAKE 1 TAB BY MOUTH DAILY. FOR ALLERGY 30 Tab 11    predniSONE (DELTASONE) 5 mg tablet Take 1 Tab by mouth daily. 7 Tab 0    methotrexate (RHEUMATREX) 2.5 mg tablet every Tuesday.  folic acid (FOLVITE) 1 mg tablet TAKE 1 TABLET BY MOUTH ONCE DAILY  0    ascorbic acid (VITAMIN C) 500 mg tablet Take  by mouth daily.  mometasone (NASONEX) 50 mcg/actuation nasal spray 2 SPRAYS BY BOTH NOSTRILS ROUTE DAILY. FOR ALLERGY SYMPTOMS 3 Container 3      Allergies   Allergen Reactions    Percocet [Oxycodone-Acetaminophen] Other (comments)     Slows heart rate. Social History     Tobacco Use    Smoking status: Never Smoker    Smokeless tobacco: Never Used   Substance Use Topics    Alcohol use:  Yes     Alcohol/week: 5.0 standard drinks     Types: 5 Cans of beer per week     Frequency: 4 or more times a week     Drinks per session: 1 or 2     Binge frequency: Never     Comment: Beer    Drug use: No     73-year-old woman who comes today in follow-up after recent initial consultation for complaints of headache as well as dizziness and visual change. Her examination was left-sided occipital neuralgia. We started her on Trileptal and did several tests. She underwent MRI of the brain with and without contrast and that was normal.  Carotid Doppler with insignificant stenosis. Visual evoked potential normal.  EEG normal.  Studies reviewed myself. She is tolerating the Trileptal.  She notes she continues to have the headache at times. No perceived side effects on the medicines. Examination  Visit Vitals  /88 (BP 1 Location: Left arm, BP Patient Position: Sitting)   Pulse 84   Resp 16   Ht 5' 4\" (1.626 m)   Wt 99.8 kg (220 lb 0.3 oz)   LMP 10/31/1986 Comment: Partial hysterectomy   SpO2 98%   BMI 37.77 kg/m²     Awake alert oriented and conversant. Normal speech and language. No ataxia. Steady gait    Impression/Plan  Occipital neuralgia left-sided  Normal studies  Discussed that she is only been on the medicine now for about 3 weeks. We will give her about another month on this dose as she has shown some improvement. Follow in a month and if she continues with a headache then we will escalate the Trileptal dose    Radha Alvarez MD      This note was created using voice recognition software. Despite editing, there may be syntax errors. This note will not be viewable in [address]

## 2020-04-01 ENCOUNTER — TELEPHONE (OUTPATIENT)
Dept: INTERNAL MEDICINE CLINIC | Age: 60
End: 2020-04-01

## 2020-04-01 NOTE — TELEPHONE ENCOUNTER
Patient has no wi fi and her phone is not working. Please call to see if she is still able to have her appt with Dr. Sanchez Snell today.

## 2020-04-20 ENCOUNTER — TELEPHONE (OUTPATIENT)
Dept: NEUROLOGY | Age: 60
End: 2020-04-20

## 2020-04-20 DIAGNOSIS — M54.81 OCCIPITAL NEURALGIA OF LEFT SIDE: Primary | ICD-10-CM

## 2020-04-20 RX ORDER — OXCARBAZEPINE 300 MG/1
300 TABLET, FILM COATED ORAL 2 TIMES DAILY
Qty: 60 TAB | Refills: 2 | Status: SHIPPED | OUTPATIENT
Start: 2020-04-20 | End: 2020-07-11

## 2020-04-20 NOTE — TELEPHONE ENCOUNTER
----- Message from Sheila Lindsey sent at 4/17/2020  2:13 PM EDT -----  Regarding: dr fox/ refill  General Message/Vendor Calls    Caller's first and last name: pt      Reason for call: pt stated she hasnt had her medication (unknown) in weeks and would like a callback to know why its not getting refilled       Callback required yes/no and why: yes      Best contact number(s): [phone]      Details to clarify the request:      Ankit Sheikh

## 2020-04-20 NOTE — TELEPHONE ENCOUNTER
S/w pt, notified this is first message rec'd regarding refill request.  She will schedule OV in June after COVID precaution has been lifted.     appt at [facility] on  Tuesday, June 02, 2020 02:20 PM

## 2020-06-26 ENCOUNTER — OFFICE VISIT (OUTPATIENT)
Dept: INTERNAL MEDICINE CLINIC | Age: 60
End: 2020-06-26

## 2020-06-26 VITALS
HEART RATE: 76 BPM | BODY MASS INDEX: 37.05 KG/M2 | SYSTOLIC BLOOD PRESSURE: 136 MMHG | OXYGEN SATURATION: 98 % | WEIGHT: 217 LBS | RESPIRATION RATE: 16 BRPM | HEIGHT: 64 IN | DIASTOLIC BLOOD PRESSURE: 86 MMHG | TEMPERATURE: 98 F

## 2020-06-26 DIAGNOSIS — F33.42 RECURRENT MAJOR DEPRESSIVE DISORDER, IN FULL REMISSION (HCC): ICD-10-CM

## 2020-06-26 DIAGNOSIS — K21.9 GASTROESOPHAGEAL REFLUX DISEASE WITHOUT ESOPHAGITIS: ICD-10-CM

## 2020-06-26 DIAGNOSIS — R47.89 WORD FINDING PROBLEM: ICD-10-CM

## 2020-06-26 DIAGNOSIS — Z12.31 ENCOUNTER FOR SCREENING MAMMOGRAM FOR MALIGNANT NEOPLASM OF BREAST: ICD-10-CM

## 2020-06-26 DIAGNOSIS — Z23 ENCOUNTER FOR IMMUNIZATION: ICD-10-CM

## 2020-06-26 DIAGNOSIS — Z79.52 CURRENT CHRONIC USE OF SYSTEMIC STEROIDS: ICD-10-CM

## 2020-06-26 DIAGNOSIS — Z00.00 MEDICARE ANNUAL WELLNESS VISIT, SUBSEQUENT: Primary | ICD-10-CM

## 2020-06-26 DIAGNOSIS — E66.01 SEVERE OBESITY (HCC): ICD-10-CM

## 2020-06-26 DIAGNOSIS — Z71.89 ADVANCED CARE PLANNING/COUNSELING DISCUSSION: ICD-10-CM

## 2020-06-26 DIAGNOSIS — Z12.11 COLON CANCER SCREENING: ICD-10-CM

## 2020-06-26 DIAGNOSIS — M06.9 RHEUMATOID ARTHRITIS INVOLVING MULTIPLE SITES, UNSPECIFIED RHEUMATOID FACTOR PRESENCE: ICD-10-CM

## 2020-06-26 RX ORDER — HYDROGEN PEROXIDE 3 %
20 SOLUTION, NON-ORAL MISCELLANEOUS DAILY
Qty: 90 CAP | Refills: 1 | Status: SHIPPED | OUTPATIENT
Start: 2020-06-26 | End: 2022-02-22

## 2020-06-26 RX ORDER — DICLOFENAC SODIUM 10 MG/G
GEL TOPICAL
COMMUNITY
Start: 2020-04-23

## 2020-06-26 NOTE — PATIENT INSTRUCTIONS
Medicare Wellness Visit, Female     The best way to live healthy is to have a lifestyle where you eat a well-balanced diet, exercise regularly, limit alcohol use, and quit all forms of tobacco/nicotine, if applicable. Regular preventive services are another way to keep healthy. Preventive services (vaccines, screening tests, monitoring & exams) can help personalize your care plan, which helps you manage your own care. Screening tests can find health problems at the earliest stages, when they are easiest to treat. [facility] follows the current, evidence-based guidelines published by the [facility] ([facility]) when recommending preventive services for our patients. Because we follow these guidelines, sometimes recommendations change over time as research supports it. (For example, mammograms used to be recommended annually. Even though Medicare will still pay for an annual mammogram, the newer guidelines recommend a mammogram every two years for women of average risk). Of course, you and your doctor may decide to screen more often for some diseases, based on your risk and your co-morbidities (chronic disease you are already diagnosed with). Preventive services for you include:  - Medicare offers their members a free annual wellness visit, which is time for you and your primary care provider to discuss and plan for your preventive service needs. Take advantage of this benefit every year!  -All adults over the age of 72 should receive the recommended pneumonia vaccines. Current USPSTF guidelines recommend a series of two vaccines for the best pneumonia protection.   -All adults should have a flu vaccine yearly and a tetanus vaccine every 10 years.   -All adults age 48 and older should receive the shingles vaccines (series of two vaccines).       -All adults age 38-68 who are overweight should have a diabetes screening test once every three years.   -All adults born between [birth year] and 1965 should be screened once for Hepatitis C.  -Other screening tests and preventive services for persons with diabetes include: an eye exam to screen for diabetic retinopathy, a kidney function test, a foot exam, and stricter control over your cholesterol.   -Cardiovascular screening for adults with routine risk involves an electrocardiogram (ECG) at intervals determined by your doctor.   -Colorectal cancer screenings should be done for adults age 54-65 with no increased risk factors for colorectal cancer. There are a number of acceptable methods of screening for this type of cancer. Each test has its own benefits and drawbacks. Discuss with your doctor what is most appropriate for you during your annual wellness visit. The different tests include: colonoscopy (considered the best screening method), a fecal occult blood test, a fecal DNA test, and sigmoidoscopy.    -A bone mass density test is recommended when a woman turns 65 to screen for osteoporosis. This test is only recommended one time, as a screening. Some providers will use this same test as a disease monitoring tool if you already have osteoporosis. -Breast cancer screenings are recommended every other year for women of normal risk, age 54-69.  -Cervical cancer screenings for women over age 72 are only recommended with certain risk factors. Here is a list of your current Health Maintenance items (your personalized list of preventive services) with a due date:  Health Maintenance Due   Topic Date Due    DTaP/Tdap/Td  (1 - Tdap) 04/18/1983    Colon Cancer Stool Test  12/12/2018    Annual Well Visit  10/23/2019              Learning About Living Marian Anthony  What is a living will? A living will is a legal form you use to write down the kind of care you want at the end of your life. It is used by the health professionals who will treat you if you aren't able to decide for yourself.   If you put your wishes in writing, your loved ones and others will know what kind of care you want. They won't need to guess. This can ease your mind and be helpful to others. A living will is not the same as an estate or property will. An estate will explains what you want to happen with your money and property after you die. Is a living will a legal document? A living will is a legal document. Each state has its own laws about living silva. If you move to another state, make sure that your living will is legal in the state where you now live. Or you might use a universal form that has been approved by many states. This kind of form can sometimes be completed and stored online. Your electronic copy will then be available wherever you have a connection to the Internet. In most cases, doctors will respect your wishes even if you have a form from a different state. · You don't need an  to complete a living will. But legal advice can be helpful if your state's laws are unclear, your health history is complicated, or your family can't agree on what should be in your living will. · You can change your living will at any time. Some people find that their wishes about end-of-life care change as their health changes. · In addition to making a living will, think about completing a medical power of  form. This form lets you name the person you want to make end-of-life treatment decisions for you (your \"health care agent\") if you're not able to. Many hospitals and nursing homes will give you the forms you need to complete a living will and a medical power of . · Your living will is used only if you can't make or communicate decisions for yourself anymore. If you become able to make decisions again, you can accept or refuse any treatment, no matter what you wrote in your living will. · Your state may offer an online registry.  This is a place where you can store your living will online so the doctors and nurses who need to treat you can find it right away. What should you think about when creating a living will? Talk about your end-of-life wishes with your family members and your doctor. Let them know what you want. That way the people making decisions for you won't be surprised by your choices. Think about these questions as you make your living will:  · Do you know enough about life support methods that might be used? If not, talk to your doctor so you know what might be done if you can't breathe on your own, your heart stops, or you're unable to swallow. · What things would you still want to be able to do after you receive life-support methods? Would you want to be able to walk? To speak? To eat on your own? To live without the help of machines? · If you have a choice, where do you want to be cared for? In your home? At a hospital or nursing home? · Do you want certain Faith practices performed if you become very ill? · If you have a choice at the end of your life, where would you prefer to die? At home? In a hospital or nursing home? Somewhere else? · Would you prefer to be buried or cremated? · Do you want your organs to be donated after you die? What should you do with your living will? · Make sure that your family members and your health care agent have copies of your living will. · Give your doctor a copy of your living will to keep in your medical record. If you have more than one doctor, make sure that each one has a copy. · You may want to put a copy of your living will where it can be easily found. Where can you learn more? Go to http://casey-charis.info/. Enter L648 in the search box to learn more about \"Learning About Living Jerzy Renee. \"  Current as of: August 8, 2016  Content Version: 11.3  © 3395-6408 [employer]. Care instructions adapted under license by [employer] (which disclaims liability or warranty for this information).  If you have questions about a medical condition or this instruction, always ask your healthcare professional. [place] [address] any warranty or liability for your use of this information.

## 2020-06-26 NOTE — ACP (ADVANCE CARE PLANNING)
Advance Care Planning     Advance Care Planning (ACP) Physician/NP/PA (Provider) Conversation    Date of ACP Conversation: 06/26/20  Persons included in Conversation:  patient  Length of ACP Conversation in minutes: <16 minutes (Non-Billable)    Authorized Decision Maker (if patient is incapable of making informed decisions):   Next of Kin by law (only applies in absence of above)        She has NO advanced directive - recommended completing forms. Reviewed DNR/DNI and patient is not interested.              Terrell Lemons MD

## 2020-06-26 NOTE — PROGRESS NOTES
Cele Hong is a 64 y.o. female who was seen in clinic today (6/26/2020) for a full physical.           Assessment & Plan:   Diagnoses and all orders for this visit:    1. Medicare annual wellness visit, subsequent    2. Advanced care planning/counseling discussion  -     FULL CODE    3. Encounter for immunization  -     diph,Pertuss,Acell,,Tet Vac-PF (ADACEL) 2 Lf-(2.5-5-3-5 mcg)-5Lf/0.5 mL susp; 0.5 mL by IntraMUSCular route once for 1 dose.  -     varicella-zoster recombinant, PF, (SHINGRIX) 50 mcg/0.5 mL susr injection; 0.5 mL IM once now and then repeat in 2-6 months    4. Encounter for screening mammogram for malignant neoplasm of breast  -     [facility] 3D HAM W MAMMO BI SCREENING INCL CAD; Future    5. Colon cancer screening  -     REFERRAL TO GASTROENTEROLOGY    6. Current chronic use of systemic steroids  -     DEXA BONE DENSITY STUDY AXIAL; Future    7. Rheumatoid arthritis involving multiple sites, unspecified rheumatoid factor presence ([facility] [address]- stable, asymptomatic, will defer to specialist, reviewed my concerns about prolonged prednisone use, she will d/w specialist    8. Severe obesity ([address]- poorly controlled, needs improvement, worsening. Increase of 20-25 lbs in the last 5 yrs does match up in our system with starting prednisone. I have recommended the following interventions: encourage exercise and lifestyle education regarding diet. Recommended talking to rheumatologist about getting off prednisone. 9. Recurrent major depressive disorder, in full remission ([address]- well controlled, no indication for treatment at this time    10. Gastroesophageal reflux disease without esophagitis- worsening, poorly controlled, not sure why off medication, will restart  -     esomeprazole (NEXIUM) 20 mg capsule; Take 1 Cap by mouth daily. 11. Word finding problem- this is a chronic problem but new to me, differential dx reviewed with the patient, exact etiology is unclear at this time.   Reassured does not sound like dementia. She is requesting further w/u so will get neuropsych testing set up. Red flags and expectations were reviewed & discussed with the her. She verbalized understanding.    -     REFERRAL TO PSYCHOLOGY      Follow-up and Dispositions    · Return in about 1 year (around 6/26/2021), or if symptoms worsen or fail to improve.         ------------------------------------------------------------------------------------------    Subjective: Annual Wellness Visit- Subsequent Visit    End of Life Planning: This was discussed with her today and she has NO advanced directive  - add't info provided. Reviewed DNR/DNI and patient is not interested. Depression Screen:  3 most recent PHQ Screens 6/26/2020   Little interest or pleasure in doing things Not at all   Feeling down, depressed, irritable, or hopeless Several days   Total Score PHQ 2 1         Fall Risk:   Fall Risk Assessment, last 12 mths 6/26/2020   Able to walk? Yes   Fall in past 12 months? No       Abuse Screen:  Abuse Screening Questionnaire 6/26/2020   Do you ever feel afraid of your partner? N   Are you in a relationship with someone who physically or mentally threatens you? N   Is it safe for you to go home? Y       Alcohol Risk Factor Screening:  Alcohol Risk Factor Screening:   Do you average 1 drink per night or more than 7 drinks a week:  No    On any one occasion in the past three months have you have had more than 3 drinks containing alcohol:  No    Functional Ability and Level of Safety:   Hearing Screen: Hearing is good. Cognition Screen:  Has your family/caregiver stated any concerns about your memory: yes - she is noticing issues w/ word finding, requesting further w/u. Ambulation: with no difficulty    Activities of Daily Living:    Exercise: very active  The home contains: no safety equipment. Patient does total self care    Adult Nutrition Screen:  No risk factors noted.        Health Maintenance:   Daily Aspirin: yes  Bone Density: will order due to prednisone use  Glaucoma Screening: n/a    Immunizations:   Influenza: declined  Tetanus: not up to date - will look into after COVID restrictions, sent in  Shingles: not up to date - will look into after COVID restrictions, sent into pharmacy  Pneumonia: up to date  Cancer screening:   Cervical: reviewed guidelines, n/a - s/p hysterectomy  Breast: reviewed guidelines, UTD - due in August, order placed  Colon: reviewed guidelines, she thinks she had one done in 2013 (records unavailable), referral placed to GI       Patient Care Team:  Tee Moreno MD as PCP - General (Internal Medicine)  Tee Moreno MD as PCP - [facility]  Jacob Magallon MD (Rheumatology)  Lizandro Olsen NP (Rheumatology)  Rose Richards MD (Otolaryngology)       The following sections were reviewed & updated as appropriate: PMH, PSH, FH, and SH. Patient Active Problem List   Diagnosis Code    Heart palpitations R00.2    Occipital neuralgia M54.81    Allergic rhinitis J30.9    Anxiety F41.9    Recurrent major depressive disorder (HCC) F33.9    Insomnia G47.00    GERD (gastroesophageal reflux disease) K21.9    Migraine without aura, without mention of intractable migraine without mention of status migrainosus G43.009    Family history of premature CAD Z80.55    Pure hypercholesterolemia E78.00    RA (rheumatoid arthritis) ([facility] [address] M06.9    Severe obesity ([facility] [address] E66.01          Prior to Admission medications    Medication Sig Start Date End Date Taking? Authorizing Provider   diclofenac (VOLTAREN) 1 % gel APPLY 2G TO AFFECTED JOINT FOUR TIMES DAILY (MAX UP TO 8 G/ PER DAY) 4/23/20  Yes Provider, Historical   OTHER Refresh eye drops   Yes Provider, Historical   OXcarbazepine (TRILEPTAL) 300 mg tablet Take 1 Tab by mouth two (2) times a day. 4/20/20  Yes Angel Villa MD   aspirin delayed-release 81 mg tablet Take  by mouth daily.    Yes Provider, Historical olopatadine (PATADAY) 0.2 % drop ophthalmic solution Administer 1 Drop to both eyes daily. Yes Provider, Historical   cholecalciferol (VITAMIN D3) (2,000 UNITS /50 MCG) cap capsule Take  by mouth two (2) times a day. Yes Provider, Historical   mometasone (NASONEX) 50 mcg/actuation nasal spray 2 SPRAYS BY BOTH NOSTRILS ROUTE DAILY. FOR ALLERGY SYMPTOMS 9/14/19  Yes Shahid Murphy MD   cetirizine (ZYRTEC) 10 mg tablet TAKE 1 TAB BY MOUTH DAILY. FOR ALLERGY 9/9/19  Yes Shahid Murphy MD   predniSONE (DELTASONE) 5 mg tablet Take 1 Tab by mouth daily. 12/4/18  Yes Pete Baldwin MD   methotrexate (RHEUMATREX) 2.5 mg tablet every Tuesday. 4/21/17  Yes Provider, Historical   folic acid (FOLVITE) 1 mg tablet TAKE 1 TABLET BY MOUTH ONCE DAILY 3/26/17  Yes Provider, Historical   ascorbic acid (VITAMIN C) 500 mg tablet Take  by mouth daily. Yes Provider, Historical   fluticasone propionate (FLONASE ALLERGY RELIEF) 50 mcg/actuation nasal spray 2 Sprays by Both Nostrils route daily. 6/26/20  Provider, Historical   esomeprazole (NEXIUM) 20 mg capsule Take  by mouth daily. 6/26/20  Provider, Historical          Allergies   Allergen Reactions    Percocet [Oxycodone-Acetaminophen] Other (comments)     Slows heart rate. Review of Systems   Constitutional: Negative for malaise/fatigue and weight loss. Respiratory: Negative for cough and shortness of breath. Cardiovascular: Negative for chest pain, palpitations and leg swelling. Gastrointestinal: Positive for constipation (on/off, associated w/ some back pain) and heartburn. Negative for abdominal pain, diarrhea, nausea and vomiting. Musculoskeletal: Positive for joint pain. Negative for myalgias. Skin: Negative for rash. Neurological: Negative for dizziness and headaches. Psychiatric/Behavioral: Negative for depression. The patient is not nervous/anxious and does not have insomnia.           Objective:   Physical Exam  Constitutional: General: She is not in acute distress. Appearance: Normal appearance. She is obese. Eyes:      Conjunctiva/sclera: Conjunctivae normal.   Cardiovascular:      Rate and Rhythm: Regular rhythm. Heart sounds: No murmur. Pulmonary:      Effort: Pulmonary effort is normal.      Breath sounds: Normal breath sounds. No decreased breath sounds or wheezing. Abdominal:      General: Bowel sounds are normal.      Palpations: Abdomen is soft. Tenderness: There is no abdominal tenderness. Musculoskeletal:      Right lower leg: No edema. Left lower leg: No edema. Psychiatric:         Mood and Affect: Mood and affect normal.            Visit Vitals  /86   Pulse 76   Temp 98 °F (36.7 °C) (Temporal)   Resp 16   Ht 5' 4.4\" (1.636 m)   Wt 217 lb (98.4 kg)   LMP 10/31/1986 Comment: Partial hysterectomy   SpO2 98%   BMI 36.79 kg/m²          Advised her to call back or return to office if symptoms worsen/change/persist.  Discussed expected course/resolution/complications of diagnosis in detail with patient. Medication risks/benefits/costs/interactions/alternatives discussed with patient. She was given an after visit summary which includes diagnoses, current medications, & vitals. She expressed understanding with the diagnosis and plan. Aspects of this note may have been generated using voice recognition software. Despite editing, there may be some syntax errors.        Allie Carolina MD

## 2020-06-26 NOTE — PROGRESS NOTES
[facility]    Progress Note    Gale Angel Patient Status:  Inpatient    1963 MRN [id number]   [facility] 4NW-A Attending Noel Brennan MD   [facility] Day # 3 PCP Tess Vincent MD     Subjective:   Gale Angel is Patient has graduated from the Transitions of Care Coordination  program on 1/3/19. Patient's symptoms are stable at this time. Patient/family has the ability to self-manage. Care management goals have been completed at this time. No further nurse navigator follow up scheduled. Pt has nurse navigator's contact information for any further questions, concerns, or needs.   Patients upcoming visits:    Future Appointments   Date Time Provider Grace Montgomery   4/16/2019  1:30 PM Jan[place] Truong [place] [id number] [facility] Neutropenic fever (HCC)      CLL/SLL: D3 of Venetoclax. WBC is responding. Decreasing LAD. Continue continuous IVF and monitor TLS labs. If stable tomorrow, we can consider discharge. Neutropenic fever: Meropenem.  Follow fever curve and neutrophil

## 2020-06-26 NOTE — PROGRESS NOTES
Annual wellness. Having \"trouble with bowels. \"    No AMD.    Colonoscopy fast tracked to Dr. Nice Drafts, confirmation received.

## 2020-07-11 DIAGNOSIS — M54.81 OCCIPITAL NEURALGIA OF LEFT SIDE: ICD-10-CM

## 2020-07-11 RX ORDER — OXCARBAZEPINE 300 MG/1
TABLET, FILM COATED ORAL
Qty: 180 TAB | Refills: 0 | Status: SHIPPED | OUTPATIENT
Start: 2020-07-11 | End: 2020-11-24

## 2020-08-09 ENCOUNTER — HOSPITAL ENCOUNTER (OUTPATIENT)
Dept: LAB | Age: 60
Discharge: HOME | End: 2020-08-09
Payer: MEDICARE

## 2020-08-09 DIAGNOSIS — U07.1 COVID-19: ICD-10-CM

## 2020-08-09 PROCEDURE — 87635 SARS-COV-2 COVID-19 AMP PRB: CPT

## 2020-08-10 LAB — SARS-COV-2, COV2NT: NOT DETECTED

## 2020-08-13 ENCOUNTER — ANESTHESIA EVENT (OUTPATIENT)
Dept: ENDOSCOPY | Age: 60
End: 2020-08-13
Payer: MEDICARE

## 2020-08-13 ENCOUNTER — ANESTHESIA (OUTPATIENT)
Dept: ENDOSCOPY | Age: 60
End: 2020-08-13
Payer: MEDICARE

## 2020-08-13 ENCOUNTER — HOSPITAL ENCOUNTER (OUTPATIENT)
Age: 60
Setting detail: OUTPATIENT SURGERY
Discharge: HOME | End: 2020-08-13
Attending: INTERNAL MEDICINE | Admitting: INTERNAL MEDICINE
Payer: MEDICARE

## 2020-08-13 VITALS
DIASTOLIC BLOOD PRESSURE: 70 MMHG | HEIGHT: 64 IN | HEART RATE: 64 BPM | TEMPERATURE: 97.6 F | BODY MASS INDEX: 36.7 KG/M2 | RESPIRATION RATE: 17 BRPM | WEIGHT: 214.95 LBS | SYSTOLIC BLOOD PRESSURE: 125 MMHG | OXYGEN SATURATION: 99 %

## 2020-08-13 PROCEDURE — 76040000019: Performed by: INTERNAL MEDICINE

## 2020-08-13 PROCEDURE — 74011250636 HC RX REV CODE- 250/636: Performed by: NURSE ANESTHETIST, CERTIFIED REGISTERED

## 2020-08-13 PROCEDURE — 76060000031 HC ANESTHESIA FIRST 0.5 HR: Performed by: INTERNAL MEDICINE

## 2020-08-13 RX ORDER — SODIUM CHLORIDE 9 MG/ML
INJECTION, SOLUTION INTRAVENOUS
Status: DISCONTINUED | OUTPATIENT
Start: 2020-08-13 | End: 2020-08-13 | Stop reason: HOSPADM

## 2020-08-13 RX ORDER — NALOXONE HYDROCHLORIDE 0.4 MG/ML
0.4 INJECTION, SOLUTION INTRAMUSCULAR; INTRAVENOUS; SUBCUTANEOUS
Status: DISCONTINUED | OUTPATIENT
Start: 2020-08-13 | End: 2020-08-13 | Stop reason: HOSPADM

## 2020-08-13 RX ORDER — FLUMAZENIL 0.1 MG/ML
0.2 INJECTION INTRAVENOUS
Status: DISCONTINUED | OUTPATIENT
Start: 2020-08-13 | End: 2020-08-13 | Stop reason: HOSPADM

## 2020-08-13 RX ORDER — PROPOFOL 10 MG/ML
INJECTION, EMULSION INTRAVENOUS
Status: DISCONTINUED | OUTPATIENT
Start: 2020-08-13 | End: 2020-08-13 | Stop reason: HOSPADM

## 2020-08-13 RX ORDER — ATROPINE SULFATE 0.1 MG/ML
0.4 INJECTION INTRAVENOUS
Status: DISCONTINUED | OUTPATIENT
Start: 2020-08-13 | End: 2020-08-13 | Stop reason: HOSPADM

## 2020-08-13 RX ORDER — SODIUM CHLORIDE 9 MG/ML
50 INJECTION, SOLUTION INTRAVENOUS CONTINUOUS
Status: DISCONTINUED | OUTPATIENT
Start: 2020-08-13 | End: 2020-08-13 | Stop reason: HOSPADM

## 2020-08-13 RX ORDER — DEXTROMETHORPHAN/PSEUDOEPHED 2.5-7.5/.8
1.2 DROPS ORAL
Status: DISCONTINUED | OUTPATIENT
Start: 2020-08-13 | End: 2020-08-13 | Stop reason: HOSPADM

## 2020-08-13 RX ORDER — MIDAZOLAM HYDROCHLORIDE 1 MG/ML
.25-5 INJECTION, SOLUTION INTRAMUSCULAR; INTRAVENOUS
Status: DISCONTINUED | OUTPATIENT
Start: 2020-08-13 | End: 2020-08-13 | Stop reason: HOSPADM

## 2020-08-13 RX ORDER — EPINEPHRINE 0.1 MG/ML
1 INJECTION INTRACARDIAC; INTRAVENOUS
Status: DISCONTINUED | OUTPATIENT
Start: 2020-08-13 | End: 2020-08-13 | Stop reason: HOSPADM

## 2020-08-13 RX ADMIN — PROPOFOL 200 MCG/KG/MIN: 10 INJECTION, EMULSION INTRAVENOUS at 10:01

## 2020-08-13 RX ADMIN — SODIUM CHLORIDE: 9 INJECTION, SOLUTION INTRAVENOUS at 09:53

## 2020-08-13 NOTE — H&P
Janiya Tong M.D.  [phone]            History and Physical       NAME:  Salima Benites   :   1960   MRN:   [id number]       Referring Physician:  Denise Ennis MD      Consult Date: 2020 8:15 AM    Chief Complaint:  Colon cancer screening    History of Present Illness:  Patient is a 64 y.o. who is seen for colon cancer screening. Denies any ongoing GI complaints. PMH:  Past Medical History:   Diagnosis Date    Acute meniscal tear of knee 08/15/2017    Dr. Prema Apple    Acute torn meniscus of knee     Right knee    Allergic rhinitis 2014    Anxiety 2014    Arthritis     Bone spur     Left shoulder    Dense breast tissue on mammogram     Depression 2014    Family history of premature CAD 7/15/2015    GERD (gastroesophageal reflux disease) 9/10/2014    Headache     Hx of mammogram 2014    WNL per pt.  Hyperlipidemia 2015    Insomnia 2014    Neuralgia     Occipital neuralgia    Occipital neuralgia 2014    RA (rheumatoid arthritis) ([facility] [address]     Screening for malignant neoplasm of the cervix Approx. 7 yrs ago    Negative per pt. PSH:  Past Surgical History:   Procedure Laterality Date    HX CHOLECYSTECTOMY      HX ENDOSCOPY  2019    esophageal dilation with 20mm sized balloon    HX KNEE ARTHROSCOPY Right 2014    HX PELVIC LAPAROSCOPY      Laproscopy of abdomen - removal of scar tissue    HX SHOULDER ARTHROSCOPY Left 2013    HX SHOULDER ARTHROSCOPY Left 2013    Shaved bone spur     HX TOTAL ABDOMINAL HYSTERECTOMY         Allergies: Allergies   Allergen Reactions    Percocet [Oxycodone-Acetaminophen] Other (comments)     Slows heart rate. Home Medications:  Cannot display prior to admission medications because the patient has not been admitted in this contact. Hospital Medications:  No current facility-administered medications for this encounter.       Current Outpatient Medications   Medication Sig    OXcarbazepine (TRILEPTAL) 300 mg tablet TAKE 1 TABLET BY MOUTH TWICE A DAY    diclofenac (VOLTAREN) 1 % gel APPLY 2G TO AFFECTED JOINT FOUR TIMES DAILY (MAX UP TO 8 G/ PER DAY)    OTHER Refresh eye drops    varicella-zoster recombinant, PF, (SHINGRIX) 50 mcg/0.5 mL susr injection 0.5 mL IM once now and then repeat in 2-6 months    esomeprazole (NEXIUM) 20 mg capsule Take 1 Cap by mouth daily.  aspirin delayed-release 81 mg tablet Take  by mouth daily.  olopatadine (PATADAY) 0.2 % drop ophthalmic solution Administer 1 Drop to both eyes daily.  cholecalciferol (VITAMIN D3) (2,000 UNITS /50 MCG) cap capsule Take  by mouth two (2) times a day.  mometasone (NASONEX) 50 mcg/actuation nasal spray 2 SPRAYS BY BOTH NOSTRILS ROUTE DAILY. FOR ALLERGY SYMPTOMS    cetirizine (ZYRTEC) 10 mg tablet TAKE 1 TAB BY MOUTH DAILY. FOR ALLERGY    predniSONE (DELTASONE) 5 mg tablet Take 1 Tab by mouth daily.  methotrexate (RHEUMATREX) 2.5 mg tablet every Tuesday.  folic acid (FOLVITE) 1 mg tablet TAKE 1 TABLET BY MOUTH ONCE DAILY    ascorbic acid (VITAMIN C) 500 mg tablet Take  by mouth daily. Social History:  Social History     Tobacco Use    Smoking status: Never Smoker    Smokeless tobacco: Never Used   Substance Use Topics    Alcohol use:  Yes     Alcohol/week: 5.0 standard drinks     Types: 5 Cans of beer per week     Frequency: 4 or more times a week     Drinks per session: 1 or 2     Binge frequency: Never     Comment: Beer       Family History:  Family History   Problem Relation Age of Onset    Diabetes Mother     Cancer Mother         cervical    Heart Disease Father 80        MI    Cancer Father         prostate    Cancer Sister         breast    Breast Cancer Sister     Heart Disease Brother 62        MI    Cancer Sister         breast    Breast Cancer Sister     Diabetes Sister     Cancer Sister         breast    Cancer Sister         lung  Diabetes Sister     Hypertension Sister     Cancer Sister         throat    No Known Problems Sister     Cancer Brother         leukemia    Hypertension Brother     Other Brother         prediabetic    Cancer Brother         lung    Heart Disease Brother     Thyroid Disease Daughter     No Known Problems Daughter     No Known Problems Son     Heart Disease Paternal Grandmother     Heart Disease Sister         MI             Review of Systems:      Constitutional: negative fever, negative chills, negative weight loss  Eyes:   negative visual changes  ENT:   negative sore throat, tongue or lip swelling  Respiratory:  negative cough, negative dyspnea  Cards:  negative for chest pain, palpitations, lower extremity edema  GI:   See HPI  :  negative for frequency, dysuria  Integument:  negative for rash and pruritus  Heme:  negative for easy bruising and gum/nose bleeding  Musculoskel: negative for myalgias,  back pain and muscle weakness  Neuro: negative for headaches, dizziness, vertigo  Psych:  negative for feelings of anxiety, depression       Objective:   No data found. No intake/output data recorded. No intake/output data recorded. EXAM:     NEURO-a&o   HEENT-wnl   LUNGS-clear    COR-regular rate and rhythym     ABD-soft , no tenderness, no rebound, good bs     EXT-no edema     Data Review     No results for input(s): WBC, HGB, HCT, PLT, HGBEXT, HCTEXT, PLTEXT in the last 72 hours. No results for input(s): NA, K, CL, CO2, BUN, CREA, GLU, PHOS, CA in the last 72 hours. No results for input(s): AP, TBIL, TP, ALB, GLOB, GGT, AML, LPSE in the last 72 hours. No lab exists for component: SGOT, GPT, AMYP, HLPSE  No results for input(s): INR, PTP, APTT, INREXT in the last 72 hours.     Patient Active Problem List   Diagnosis Code    Heart palpitations R00.2    Occipital neuralgia M54.81    Allergic rhinitis J30.9    Anxiety F41.9    Recurrent major depressive disorder ([facility] [address] F33.9    Insomnia G47.00    GERD (gastroesophageal reflux disease) K21.9    Migraine without aura, without mention of intractable migraine without mention of status migrainosus G43.009    Family history of premature CAD Z80.55    Pure hypercholesterolemia E78.00    RA (rheumatoid arthritis) ([facility] [address] M06.9    Severe obesity ([facility] E66.01      Assessment:   · Colon cancer screening   Plan:   · Colonoscopy today.      Signed By: Zuhair Willingham MD     8/13/2020  8:15 AM

## 2020-08-13 NOTE — DISCHARGE INSTRUCTIONS
[address] [facility]. Maryellen Coon M.D.  [phone]          COLON DISCHARGE INSTRUCTIONS       2020    Galen Cortes  :  1960  [place] Medical Record Number:  [id number]      COLONOSCOPY FINDINGS:  Your colonoscopy showed: mild diverticulosis, otherwise normal.    DISCOMFORT:  Redness at IV site- apply warm compress to area; if redness or soreness persist- contact your physician  There may be a slight amount of blood passed from the rectum  Gaseous discomfort- walking, belching will help relieve any discomfort  You may not operate a vehicle for 12 hours  You may not engage in an occupation involving machinery or appliances for rest of today  You may not drink alcoholic beverages for at least 12 hours  Avoid making any critical decisions for at least 24 hour  DIET:   High fiber diet. - however -  remember your colon is empty and a heavy meal will produce gas. Avoid these foods:  vegetables, fried / greasy foods, carbonated drinks for today     ACTIVITY:  You may resume your normal daily activities it is recommended that you spend the remainder of the day resting -  avoid any strenuous activity. CALL M.D. ANY SIGN OF:   Increasing pain, nausea, vomiting  Abdominal distension (swelling)  New increased bleeding (oral or rectal)  Fever (chills)  Pain in chest area  Bloody discharge from nose or mouth   Shortness of breath    Follow-up Instructions:   Call Dr. Sofia Villalta if any questions or problems. Telephone # [phone]  Should have a repeat colonoscopy in 10 years. Patient Education        Learning About Diverticulosis and Diverticulitis  What are diverticulosis and diverticulitis? In diverticulosis and diverticulitis, pouches called diverticula form in the wall of the large intestine, or colon. · In diverticulosis, the pouches do not cause any pain or other symptoms. · In diverticulitis, the pouches get inflamed or infected and cause symptoms.   Doctors aren't sure what causes these pouches in the colon. But they think that a low-fiber diet may play a role. Without fiber to add bulk to the stool, the colon has to work harder than normal to push the stool forward. The pressure from this may cause pouches to form in weak spots along the colon. Some people with diverticulosis get diverticulitis. But experts don't know why this happens. What are the symptoms? · In diverticulosis, most people don't have symptoms. But pouches sometimes bleed. · In diverticulitis, symptoms may last from a few hours to a week or more. They include:  ? Belly pain. This is usually in the lower left side. It is sometimes worse when you move. This is the most common symptom. ? Fever and chills. ? Bloating and gas. ? Diarrhea or constipation. ? Nausea and sometimes vomiting.  ? Not feeling like eating. How can you prevent diverticulitis? You may be able to lower your chance of getting diverticulitis. You can do this by taking steps to prevent constipation. · Eat fruits, vegetables, beans, and whole grains every day. These foods are high in fiber. · Drink plenty of fluids. (Drink enough so that your urine is light yellow or clear like water.) If you have kidney, heart, or liver disease and have to limit fluids, talk with your doctor before you increase the amount of fluids you drink. · Get at least 30 minutes of exercise on most days of the week. Walking is a good choice. You also may want to do other activities, such as running, swimming, cycling, or playing tennis or team sports. · Take a fiber supplement, such as Citrucel or Metamucil, every day if needed. Read and follow all instructions on the label. · Schedule time each day for a bowel movement. Having a daily routine may help. Take your time and do not strain when having a bowel movement. Some people avoid nuts, seeds, berries, and popcorn. They believe that these foods might get trapped in the diverticula and cause pain.  But there is no proof that these foods cause diverticulitis or make it worse. How are these problems treated? · The best way to treat diverticulosis is to avoid constipation. · Treatment for diverticulitis includes antibiotics. It often includes a change in your diet. You may need only liquids at first. Your doctor may suggest pain medicines for pain or belly cramps. In some cases, surgery may be needed. Follow-up care is a key part of your treatment and safety. Be sure to make and go to all appointments, and call your doctor if you are having problems. It's also a good idea to know your test results and keep a list of the medicines you take. Where can you learn more? Go to http://casey-charis.info/  Enter R776 in the search box to learn more about \"Learning About Diverticulosis and Diverticulitis. \"  Current as of: August 12, 2019               Content Version: 12.5  © 3857-9414 [employer]. Care instructions adapted under license by [employer] (which disclaims liability or warranty for this information). If you have questions about a medical condition or this instruction, always ask your healthcare professional. [address] any warranty or liability for your use of this information.

## 2020-08-13 NOTE — PROGRESS NOTES
Anesthesia staff at patient's bedside administering anesthesia and monitoring patients vital signs throughout procedure. See anesthesia note. ABD remains soft and non-tender post procedure. Pt has no complaints at this time and tolerated the procedure well. Endoscope was pre-cleaned at bedside immediately following procedure by Tony Juárez.

## 2020-08-13 NOTE — PROCEDURES
Danley Cabot, M.D.  [phone]            2020          Colonoscopy Operative Report  Ruslan Daly  :  1960  [place] Medical Record Number:  [id number]      Indications:    Screening colonoscopy     :  Ermelinda Kuo MD    Assistant -- None  Implants -- None    Referring Provider: Shahid Love MD    Sedation:  MAC anesthesia Propofol    Pre-Procedural Exam:      Airway: clear,  No airway problems anticipated  Heart: RRR, without gallops or rubs  Lungs: clear bilaterally without wheezes, crackles, or rhonchi  Abdomen: soft, nontender, nondistended, bowel sounds present  Mental Status: awake, alert and oriented to person, place and time     Procedure Details:  After informed consent was obtained with all risks and benefits of procedure explained and preoperative exam completed, the patient was taken to the endoscopy suite and placed in the left lateral decubitus position. Upon sequential sedation as per above, a digital rectal exam was performed. The Olympus videocolonoscope  was inserted in the rectum and carefully advanced to the terminal ileum. The quality of preparation was good. The colonoscope was slowly withdrawn with careful inspection and evaluation between folds. Retroflexion in the rectum was performed. Findings:   Terminal Ileum: normal  Cecum: normal  Ascending Colon: normal  Transverse Colon: normal  Descending Colon: normal  Sigmoid:     - Diverticulosis  Rectum: normal    Interventions:  none    Specimen Removed:  none    Complications: None. EBL:  None. Impression:  Mild diverticulosis noted in the sigmoid colon, otherwise normal exam    Recommendations:  -Repeat colonoscopy in 10 years   -High fiber diet.    -Resume normal medication(s). Discharge Disposition:  Home in the company of a  when able to ambulate.     Ermelinda Kuo MD  2020  10:16 AM

## 2020-08-13 NOTE — ANESTHESIA POSTPROCEDURE EVALUATION
Procedure(s):  COLONOSCOPY. MAC    Anesthesia Post Evaluation        Patient location during evaluation: PACU  Level of consciousness: awake  Pain management: adequate  Airway patency: patent  Anesthetic complications: no  Cardiovascular status: acceptable  Respiratory status: acceptable  Hydration status: acceptable  Post anesthesia nausea and vomiting:  none      INITIAL Post-op Vital signs:   Vitals Value Taken Time   /70 8/13/2020 10:29 AM   Temp 36.4 °C (97.6 °F) 8/13/2020 10:20 AM   Pulse 63 8/13/2020 10:31 AM   Resp 17 8/13/2020 10:31 AM   SpO2 100 % 8/13/2020 10:31 AM   Vitals shown include unvalidated device data.

## 2020-08-13 NOTE — ROUTINE PROCESS
Carlos Weir  1960  [id number]    Situation:  Verbal report received from: Melina Dubon RN  Procedure: Procedure(s):  COLONOSCOPY    Background:    Preoperative diagnosis: SCREENING  Postoperative diagnosis: DIverticulosis sigmoid colon mild    :  Dr. Yandel Cabrera  Assistant(s): Endoscopy Technician-1: Lucia Acosta  Endoscopy RN-1: Dev Aguilar  Endoscopy RN-2: Shayan León RN    Specimens: * No specimens in log *  H. Pylori  no    Assessment:  Intra-procedure medications     Anesthesia gave intra-procedure sedation and medications, see anesthesia flow sheet yes    Intravenous fluids: NS@ KVO     Vital signs stable     Abdominal assessment: round and soft     Recommendation:  Discharge patient per MD order. Family or Friend   Permission to share finding with family or friend yes    Endoscopy discharge instructions have been reviewed and given to patient. The patient verbalized understanding and acceptance of instructions. Dr. Yandel Cabrera discussed with patient procedure findings and next steps.

## 2020-08-21 ENCOUNTER — HOSPITAL ENCOUNTER (OUTPATIENT)
Dept: MAMMOGRAPHY | Age: 60
Discharge: HOME | End: 2020-08-21
Attending: INTERNAL MEDICINE
Payer: MEDICARE

## 2020-08-21 DIAGNOSIS — Z79.52 CURRENT CHRONIC USE OF SYSTEMIC STEROIDS: ICD-10-CM

## 2020-08-21 DIAGNOSIS — Z12.31 ENCOUNTER FOR SCREENING MAMMOGRAM FOR MALIGNANT NEOPLASM OF BREAST: ICD-10-CM

## 2020-08-21 PROCEDURE — 77063 BREAST TOMOSYNTHESIS BI: CPT

## 2020-08-21 PROCEDURE — 77080 DXA BONE DENSITY AXIAL: CPT

## 2020-08-26 ENCOUNTER — TELEPHONE (OUTPATIENT)
Dept: INTERNAL MEDICINE CLINIC | Age: 60
End: 2020-08-26

## 2020-10-19 ENCOUNTER — TRANSCRIBE ORDER (OUTPATIENT)
Dept: SCHEDULING | Age: 60
End: 2020-10-19

## 2020-10-19 DIAGNOSIS — M47.812 SPONDYLOSIS OF CERVICAL JOINT: Primary | ICD-10-CM

## 2020-10-21 ENCOUNTER — HOSPITAL ENCOUNTER (OUTPATIENT)
Dept: MRI IMAGING | Age: 60
Discharge: HOME | End: 2020-10-21
Attending: ORTHOPAEDIC SURGERY
Payer: MEDICARE

## 2020-10-21 DIAGNOSIS — M47.812 SPONDYLOSIS OF CERVICAL JOINT: ICD-10-CM

## 2020-10-21 PROCEDURE — 72141 MRI NECK SPINE W/O DYE: CPT

## 2020-11-24 DIAGNOSIS — M54.81 OCCIPITAL NEURALGIA OF LEFT SIDE: ICD-10-CM

## 2020-11-24 RX ORDER — OXCARBAZEPINE 300 MG/1
TABLET, FILM COATED ORAL
Qty: 60 TAB | Refills: 0 | Status: SHIPPED | OUTPATIENT
Start: 2020-11-24 | End: 2022-02-22

## 2020-12-17 ENCOUNTER — TELEPHONE (OUTPATIENT)
Dept: INTERNAL MEDICINE CLINIC | Age: 60
End: 2020-12-17

## 2020-12-17 NOTE — TELEPHONE ENCOUNTER
Patient called (verified by two HIPAA patient identifiers) and requested referral from Dr. Lucky Hamman to Dr. Herminio Roman be faxed to their office so she can schedule an appointment with her. Referral order faxed to [phone]. Confirmation of fax received, and patient notified. Patient will call them to schedule.

## 2020-12-18 ENCOUNTER — TELEPHONE (OUTPATIENT)
Dept: INTERNAL MEDICINE CLINIC | Age: 60
End: 2020-12-18

## 2020-12-18 NOTE — TELEPHONE ENCOUNTER
Fax from [facility], don't do referrals for word finding problem. Per Dr. Susan Oneil, for memory deficit.   Called Yulissa and left [facility], requested she call back if needs anything additional.

## 2020-12-19 DIAGNOSIS — J30.1 SEASONAL ALLERGIC RHINITIS DUE TO POLLEN: ICD-10-CM

## 2020-12-19 RX ORDER — CETIRIZINE HCL 10 MG
10 TABLET ORAL DAILY
Qty: 30 TAB | Refills: 11 | OUTPATIENT
Start: 2020-12-19

## 2020-12-21 DIAGNOSIS — R47.89 WORD FINDING PROBLEM: Primary | ICD-10-CM

## 2020-12-21 DIAGNOSIS — R41.3 MEMORY CHANGE: ICD-10-CM

## 2020-12-30 ENCOUNTER — TELEPHONE (OUTPATIENT)
Dept: INTERNAL MEDICINE CLINIC | Age: 60
End: 2020-12-30

## 2021-01-12 ENCOUNTER — TELEPHONE (OUTPATIENT)
Dept: INTERNAL MEDICINE CLINIC | Age: 61
End: 2021-01-12

## 2021-01-12 NOTE — TELEPHONE ENCOUNTER
Gal Patel (Self) [phone] (M)     Pt says that she is having some itching on the bottom of her feet and in the palms of her hands. She cannot see a rash, but that they are red and swelling when this happens. She wonders of something could be sent to pharm for this?

## 2021-01-13 ENCOUNTER — OFFICE VISIT (OUTPATIENT)
Dept: INTERNAL MEDICINE CLINIC | Age: 61
End: 2021-01-13
Payer: MEDICARE

## 2021-01-13 VITALS
OXYGEN SATURATION: 98 % | HEIGHT: 64 IN | RESPIRATION RATE: 16 BRPM | HEART RATE: 91 BPM | BODY MASS INDEX: 37.15 KG/M2 | SYSTOLIC BLOOD PRESSURE: 128 MMHG | WEIGHT: 217.6 LBS | TEMPERATURE: 97.6 F | DIASTOLIC BLOOD PRESSURE: 86 MMHG

## 2021-01-13 DIAGNOSIS — R21 RASH: Primary | ICD-10-CM

## 2021-01-13 DIAGNOSIS — L50.9 HIVES: ICD-10-CM

## 2021-01-13 DIAGNOSIS — J30.1 SEASONAL ALLERGIC RHINITIS DUE TO POLLEN: ICD-10-CM

## 2021-01-13 PROCEDURE — 99213 OFFICE O/P EST LOW 20 MIN: CPT | Performed by: NURSE PRACTITIONER

## 2021-01-13 PROCEDURE — G0463 HOSPITAL OUTPT CLINIC VISIT: HCPCS | Performed by: NURSE PRACTITIONER

## 2021-01-13 RX ORDER — CYCLOBENZAPRINE HCL 10 MG
TABLET ORAL
COMMUNITY
Start: 2020-12-16

## 2021-01-13 RX ORDER — CETIRIZINE HCL 10 MG
10 TABLET ORAL DAILY
Qty: 30 TAB | Refills: 11 | Status: SHIPPED | OUTPATIENT
Start: 2021-01-13 | End: 2022-03-16

## 2021-01-13 RX ORDER — METHYLPREDNISOLONE 4 MG/1
TABLET ORAL
Qty: 1 DOSE PACK | Refills: 0 | Status: SHIPPED | OUTPATIENT
Start: 2021-01-13 | End: 2022-03-15 | Stop reason: ALTCHOICE

## 2021-01-13 NOTE — PROGRESS NOTES
HISTORY OF PRESENT ILLNESS  Kusum Flannery is a 64 y.o. female. Patient reports intermittent itchy rash with hives x 3 days. It gets worse after getting out of the shower. She initially noticed on the soles of her feet. After scratching, the hives appeared briefly, gone within an hour. The other night she noticed a patch at right axillary region then last night a patch on left flexural elbow region. Hives are very brief and only appear after scratching. Today she reports a patch on left wrist. She took benadryl twice with relief. Patient states she has not changed any soaps, detergents, foods, or medications, except she ran out of her zyrtec about 2 months ago. Patient does report history of RA and has been very stressed lately. She is taking all medications as prescribed, except zyrtec. Patient has follow up appointment with rheumatologist this month. Visit Vitals  /86 (BP 1 Location: Right arm, BP Patient Position: Sitting)   Pulse 91   Temp 97.6 °F (36.4 °C) (Temporal)   Resp 16   Ht 5' 4\" (1.626 m)   Wt 217 lb 9.6 oz (98.7 kg)   LMP 10/31/1986 Comment: Partial hysterectomy   SpO2 98%   BMI 37.35 kg/m²       HPI    Review of Systems   Skin: Positive for itching and rash. Physical Exam  Constitutional:       Appearance: Normal appearance. Skin:     General: Skin is warm and dry. Comments: Slightly rough 2cm sandpaper-like patch with minimal erythema of right wrist, no flaking, + itching; no other rash noted; no visible hives   Neurological:      Mental Status: She is alert. ASSESSMENT and PLAN    ICD-10-CM ICD-9-CM    1. Rash  R21 782.1    2. Seasonal allergic rhinitis due to pollen  J30.1 477.0 cetirizine (ZYRTEC) 10 mg tablet   3.  Hives  L50.9 708.9      Orders Placed This Encounter    cyclobenzaprine (FLEXERIL) 10 mg tablet    cetirizine (ZYRTEC) 10 mg tablet    methylPREDNISolone (MEDROL DOSEPACK) 4 mg tablet   restart daily zyrtec  Start medrol dose pack  Follow up if no improvement this week

## 2021-08-19 ENCOUNTER — TRANSCRIBE ORDER (OUTPATIENT)
Dept: SCHEDULING | Age: 61
End: 2021-08-19

## 2021-08-19 DIAGNOSIS — Z12.31 SCREENING MAMMOGRAM FOR HIGH-RISK PATIENT: Primary | ICD-10-CM

## 2021-10-05 ENCOUNTER — HOSPITAL ENCOUNTER (OUTPATIENT)
Dept: MAMMOGRAPHY | Age: 61
Discharge: HOME | End: 2021-10-05
Attending: INTERNAL MEDICINE
Payer: MEDICARE

## 2021-10-05 DIAGNOSIS — Z12.31 SCREENING MAMMOGRAM FOR HIGH-RISK PATIENT: ICD-10-CM

## 2021-10-05 PROCEDURE — 77063 BREAST TOMOSYNTHESIS BI: CPT

## 2021-12-15 ENCOUNTER — TELEPHONE (OUTPATIENT)
Dept: INTERNAL MEDICINE CLINIC | Age: 61
End: 2021-12-15

## 2021-12-15 NOTE — TELEPHONE ENCOUNTER
Reason for call:  Pt called and stated that she tested positive for COVID. Asking for advice on what to do to get ahead of symptoms. Stated that she is badly congested. Requested call back for [facility] advice on dealing with the COVID.     Is this a new problem: yes     Date of last appointment:  1/13/2021     Can we respond via [employer]: no    Best call back number: [phone]

## 2021-12-15 NOTE — TELEPHONE ENCOUNTER
Call to patient, identified with 2 identifiers. Advised of Dr. Shantel Mata' note and recommendations. Patient verbalized understanding.

## 2021-12-15 NOTE — TELEPHONE ENCOUNTER
She has not been seen since June '20. I have no updated labs or med list.  She will need to ask her rheumatologist about interaction. I don't think it will be an issue but I don't know.

## 2021-12-15 NOTE — TELEPHONE ENCOUNTER
RC to patient. She tested positive last night for Covid. Temp 100.6, congestion, cough. She is feeling a little better today but still symptomatic. Gave her the number to infusion solutions. Told her they would do a screening with her to see if eligible for infusion. Says her daughter brought her some Respiratory Support and Defense with 600 mg NAC and setria gluthathione 200mg, wants to know if okay to take with her methotrexate. Told her I would have to check with Dr. Zaida Rodriguez. If does not get infusion, will treat symptomatically, quarantine, tylenol, mucinex, keep hydrated, rest, avoid Ibuprofen. She does not use mychart, gave her the phone number to figure out her issues.

## 2022-02-21 ENCOUNTER — HOSPITAL ENCOUNTER (OUTPATIENT)
Dept: PREADMISSION TESTING | Age: 62
Discharge: HOME | End: 2022-02-21
Payer: MEDICARE

## 2022-02-21 PROCEDURE — U0005 INFEC AGEN DETEC AMPLI PROBE: HCPCS

## 2022-02-22 LAB
SARS-COV-2, XPLCVT: NOT DETECTED
SOURCE, COVRS: NORMAL

## 2022-02-22 RX ORDER — PANTOPRAZOLE SODIUM 40 MG/1
40 TABLET, DELAYED RELEASE ORAL DAILY
COMMUNITY
End: 2022-03-15 | Stop reason: ALTCHOICE

## 2022-02-22 NOTE — PROGRESS NOTES
[address]  Endoscopy Preprocedure Instructions      1. On the day of your surgery, please report to registration located on the 2nd floor of the  [facility]. yes    2. You must have a responsible adult to drive you to the hospital, stay at the hospital during your procedure and drive you home. If they leave your procedure will not be started (no exceptions). yes    3. Do not have anything to eat or drink (including water, gum, mints, coffee, and juice) after midnight. This does not apply to the medications you were instructed to take by your physician. yes  If you are currently taking Plavix, Coumadin, Aspirin, or other blood-thinning agents, contact your physician for special instructions. not applicable,    4. If you are having a procedure that requires bowel prep: We recommend that you have only clear liquids the day before your procedure and begin your bowel prep by 5:00 pm.  You may continue to drink clear liquids until midnight. If for any reason you are not able to complete your prep please notify your physician immediately. not applicable    5. Have a list of all current medications, including vitamins, herbal supplements and any other over the counter medications. yes    6. If you wear glasses, contacts, dentures and/or hearing aids, they may be removed prior to procedure, please bring a case to store them in. yes    7. You should understand that if you do not follow these instructions your procedure may be cancelled. If your physical condition changes (I.e. fever, cold or flu) please contact your doctor as soon as possible. 8. It is important that you be on time. If for any reason you are unable to keep your appointment please call [phone] the day of or your physicians office prior to your scheduled procedure    9.  Have you received your COVID Vaccine? yes If no, you will need to receive a COVID test/swab here at [facility] the [facility] parking lot Monday - Friday 8a - 11am. There are no Saturday or Sunday swabbing at any [address] [place] [address] facility. (patient verbalizes understanding) yes

## 2022-02-25 ENCOUNTER — HOSPITAL ENCOUNTER (OUTPATIENT)
Age: 62
Setting detail: OUTPATIENT SURGERY
Discharge: HOME | End: 2022-02-25
Attending: INTERNAL MEDICINE | Admitting: INTERNAL MEDICINE
Payer: MEDICARE

## 2022-02-25 ENCOUNTER — ANESTHESIA (OUTPATIENT)
Dept: ENDOSCOPY | Age: 62
End: 2022-02-25
Payer: MEDICARE

## 2022-02-25 ENCOUNTER — ANESTHESIA EVENT (OUTPATIENT)
Dept: ENDOSCOPY | Age: 62
End: 2022-02-25
Payer: MEDICARE

## 2022-02-25 VITALS
WEIGHT: 205.47 LBS | BODY MASS INDEX: 34.23 KG/M2 | SYSTOLIC BLOOD PRESSURE: 143 MMHG | HEIGHT: 65 IN | TEMPERATURE: 97.7 F | OXYGEN SATURATION: 97 % | DIASTOLIC BLOOD PRESSURE: 96 MMHG | HEART RATE: 74 BPM | RESPIRATION RATE: 20 BRPM

## 2022-02-25 PROCEDURE — 74011250636 HC RX REV CODE- 250/636: Performed by: NURSE ANESTHETIST, CERTIFIED REGISTERED

## 2022-02-25 PROCEDURE — 2709999900 HC NON-CHARGEABLE SUPPLY: Performed by: INTERNAL MEDICINE

## 2022-02-25 PROCEDURE — 76060000031 HC ANESTHESIA FIRST 0.5 HR: Performed by: INTERNAL MEDICINE

## 2022-02-25 PROCEDURE — 88305 TISSUE EXAM BY PATHOLOGIST: CPT

## 2022-02-25 PROCEDURE — C1726 CATH, BAL DIL, NON-VASCULAR: HCPCS | Performed by: INTERNAL MEDICINE

## 2022-02-25 PROCEDURE — 77030021593 HC FCPS BIOP ENDOSC BSC -A: Performed by: INTERNAL MEDICINE

## 2022-02-25 PROCEDURE — 76040000019: Performed by: INTERNAL MEDICINE

## 2022-02-25 RX ORDER — DEXTROMETHORPHAN/PSEUDOEPHED 2.5-7.5/.8
1.2 DROPS ORAL
Status: DISCONTINUED | OUTPATIENT
Start: 2022-02-25 | End: 2022-02-25 | Stop reason: HOSPADM

## 2022-02-25 RX ORDER — NALOXONE HYDROCHLORIDE 0.4 MG/ML
0.4 INJECTION, SOLUTION INTRAMUSCULAR; INTRAVENOUS; SUBCUTANEOUS
Status: DISCONTINUED | OUTPATIENT
Start: 2022-02-25 | End: 2022-02-25 | Stop reason: HOSPADM

## 2022-02-25 RX ORDER — SODIUM CHLORIDE 9 MG/ML
INJECTION, SOLUTION INTRAVENOUS
Status: DISCONTINUED | OUTPATIENT
Start: 2022-02-25 | End: 2022-02-25 | Stop reason: HOSPADM

## 2022-02-25 RX ORDER — PROPOFOL 10 MG/ML
INJECTION, EMULSION INTRAVENOUS AS NEEDED
Status: DISCONTINUED | OUTPATIENT
Start: 2022-02-25 | End: 2022-02-25 | Stop reason: HOSPADM

## 2022-02-25 RX ORDER — FLUMAZENIL 0.1 MG/ML
0.2 INJECTION INTRAVENOUS
Status: DISCONTINUED | OUTPATIENT
Start: 2022-02-25 | End: 2022-02-25 | Stop reason: HOSPADM

## 2022-02-25 RX ORDER — MIDAZOLAM HYDROCHLORIDE 1 MG/ML
.25-5 INJECTION, SOLUTION INTRAMUSCULAR; INTRAVENOUS
Status: DISCONTINUED | OUTPATIENT
Start: 2022-02-25 | End: 2022-02-25 | Stop reason: HOSPADM

## 2022-02-25 RX ORDER — SODIUM CHLORIDE 9 MG/ML
50 INJECTION, SOLUTION INTRAVENOUS CONTINUOUS
Status: DISCONTINUED | OUTPATIENT
Start: 2022-02-25 | End: 2022-02-25 | Stop reason: HOSPADM

## 2022-02-25 RX ORDER — ATROPINE SULFATE 0.1 MG/ML
0.4 INJECTION INTRAVENOUS
Status: DISCONTINUED | OUTPATIENT
Start: 2022-02-25 | End: 2022-02-25 | Stop reason: HOSPADM

## 2022-02-25 RX ORDER — EPINEPHRINE 0.1 MG/ML
1 INJECTION INTRACARDIAC; INTRAVENOUS
Status: DISCONTINUED | OUTPATIENT
Start: 2022-02-25 | End: 2022-02-25 | Stop reason: HOSPADM

## 2022-02-25 RX ADMIN — PROPOFOL INJECTABLE EMULSION 80 MG: 10 INJECTION, EMULSION INTRAVENOUS at 08:23

## 2022-02-25 RX ADMIN — SODIUM CHLORIDE: 900 INJECTION, SOLUTION INTRAVENOUS at 08:11

## 2022-02-25 RX ADMIN — PROPOFOL INJECTABLE EMULSION 40 MG: 10 INJECTION, EMULSION INTRAVENOUS at 08:28

## 2022-02-25 RX ADMIN — PROPOFOL INJECTABLE EMULSION 40 MG: 10 INJECTION, EMULSION INTRAVENOUS at 08:30

## 2022-02-25 RX ADMIN — PROPOFOL INJECTABLE EMULSION 80 MG: 10 INJECTION, EMULSION INTRAVENOUS at 08:24

## 2022-02-25 RX ADMIN — PROPOFOL INJECTABLE EMULSION 40 MG: 10 INJECTION, EMULSION INTRAVENOUS at 08:26

## 2022-02-25 RX ADMIN — PROPOFOL INJECTABLE EMULSION 80 MG: 10 INJECTION, EMULSION INTRAVENOUS at 08:22

## 2022-02-25 NOTE — ANESTHESIA PREPROCEDURE EVALUATION
Anesthetic History   No history of anesthetic complications            Review of Systems / Medical History  Patient summary reviewed, nursing notes reviewed and pertinent labs reviewed    Pulmonary  Within defined limits              Comments: Allergic rhinitis   Neuro/Psych         Psychiatric history (anxiety/depression)     Cardiovascular                  Exercise tolerance: >4 METS     GI/Hepatic/Renal     GERD           Endo/Other        Arthritis     Other Findings   Comments: Occipital neuralgia  RA           Physical Exam    Airway  Mallampati: II  TM Distance: > 6 cm  Neck ROM: normal range of motion   Mouth opening: Normal     Cardiovascular    Rhythm: regular  Rate: normal         Dental    Dentition: Lower dentition intact and Upper dentition intact     Pulmonary  Breath sounds clear to auscultation               Abdominal         Other Findings            Anesthetic Plan    ASA: 2  Anesthesia type: MAC          Induction: Intravenous  Anesthetic plan and risks discussed with: Patient

## 2022-02-25 NOTE — PROGRESS NOTES
Endoscopy discharge instructions have been reviewed and given to patient. The patient verbalized understanding and acceptance of instructions. Dr. Castillo So discussed with patient procedure findings and next steps.

## 2022-02-25 NOTE — H&P
The patient is a 63year old female who presents with a complaint of Acid Reflux. The patient presents due to reoccurrence of symptoms (The patient is a 63year old female who presents with a complaint of Acid Reflux. The patient presents due to reoccurrence of symptoms (Pt with history of schatzkis ring who presents for complaints of epigastric pain and dysphagia. gets indigestion all the time and pills wont go down. She has had occasionally diarrhea She feels a lot of acid build up on her throat. Takes BCs frequently for headaches. ). There has been no associated melena. Previous diagnostic tests have included endoscopy (2018 - Schatzkis ring dilated. ). 2/15/22 - Pt presents for complaints of worsening symptoms.  Last week she took an Advil PM which got stuck in her throat then had vomiting and chest pain.  Her throat feels raw and her voice is hoarse. ). Problem List/Past Medical (Apurva Bermeo; 2/15/2022 10:19 AM)  Epigastric pain (R10.13)   Pt with history of schatzkis ring who presents for epigastric pain and dysphagia. Has been using BC Powders frequently for headaches. Denies melena. Globus sensation (R19.8)    Dysphagia (R13.10)    Allergic Rhinitis    Arthritis    Anxiety (F41.9)    Depression    Gastroesophageal Reflux Disease    Rheumatoid Arthritis      Past Surgical History (Solis Bermeo; 2/15/2022 10:19 AM)  Cholecystectomy    H/O right knee surgery (B12.623)    Laparoscopy    Arthroscopy of Shoulder   Left. Hysterectomy; Total      Allergies (Ernestina Ly; 2/15/2022 10:19 AM)  Percocet *ANALGESICS - OPIOID*      Medication History (Apurva Ly; 2/15/2022 10:20 AM)  Pantoprazole Sodium  (40MG Tablet DR, 1 (one) Oral daily, Taken starting 02/03/2022) Active. Cyclobenzaprine-Diet Manage Pr  (10MG Misc, Oral) Active. (prn)  Cetirizine HCl  (10MG Tablet, 1 Oral daily) Active. Aspirin  (81MG Tablet, 1 Oral daily) Active. Folic Acid  (1MG Tablet, 1 Oral daily) Active.   Nasonex  (50MCG/ACT Suspension, Nasal daily) Active. PredniSONE  (5MG Tablet, 1 Oral daily) Active. Pataday  (0.2% Solution, Ophthalmic daily) Active. Vitamin C  (500MG Tablet, 1 Oral daily) Active. Vitamin D3  (2000UNIT Capsule, 1 Oral daily) Active. Methotrexate (Arthritis)  (2.5MG Tablet, Oral) Active. Medications Reconciled     Family History (Shahla Ly; 2/15/2022 10:19 AM)  Cervical Cancer   Mother. Diabetes Mellitus   Mother, Sister. Prostate Cancer   Father. Heart Disease   Father, Sister, Brother. Breast Cancer   Sister. Leukemia   Brother. Thyroid disease   Daughter. Social History (Shahla Ly; 2/15/2022 10:19 AM)  Tobacco Use   Never smoker. Alcohol Use   Occasional alcohol use, Drinks beer. Marital status   . Employment status   N/a. Blood Transfusion   No.    Diagnostic Studies History (Ernestina Ly; 2/15/2022 10:19 AM)  None   [02/03/2022]: Health Maintenance History (Shahla Ly; 2/15/2022 10:19 AM)  Flu Vaccine   none  Pneumovax   none    Other Problems (Ernestina Ly; 2/15/2022 10:19 AM)  Colon cancer screening (Z12.11)          Review of Systems (Hema LENNON Abby CanoP; 2/15/2022 10:19 AM)  General Not Present- Chronic Fatigue, Poor Appetite, Weight Gain and Weight Loss. Skin Not Present- Itching, Rash and Skin Color Changes. HEENT Not Present- Hearing Loss and Vertigo. Respiratory Not Present- Difficulty Breathing and TB exposure. Cardiovascular Not Present- Chest Pain, Use of Antibiotics before Dental Procedures and Use of Blood Thinners. Gastrointestinal Present- See HPI. Musculoskeletal Present- Arthritis. Not Present- Hip Replacement Surgery and Knee Replacement Surgery. Neurological Not Present- Weakness. Psychiatric Present- Depression. Endocrine Not Present- Diabetes and Thyroid Problems. Hematology Not Present- Anemia. Vitals (Shahla Ojeda  Abby CanoP; 2/15/2022 10:26 AM)  2/15/2022 10:20 AM  Weight: 210 lb   Height: 64.5 in   Body Surface Area: 2.01 m²   Body Mass Index: 35.49 kg/m²    Temp.: 97.1° F    Pulse: 81 (Regular)     BP: 137/87(Sitting, Left Arm, Standard)              Physical Exam (Krunal Marquez AGACNP; 2/15/2022 10:56 AM)  General  Mental Status - Alert. General Appearance - Cooperative, Pleasant, Not in acute distress. Orientation - Oriented X3. Integumentary  General Characteristics  Color - normal coloration of skin. Head and Neck  Neck  Global Assessment - supple. Eye  Sclera/Conjunctiva - Bilateral - No Jaundice - Bilateral.    Chest and Lung Exam  Chest and lung exam reveals  - quiet, even and easy respiratory effort with no use of accessory muscles. Auscultation  Breath sounds - Normal. Adventitious sounds - No Adventitious sounds. Cardiovascular  Auscultation  Rhythm - Regular, No Tachycardia, No Bradycardia . Heart Sounds - Normal heart sounds , S1 WNL and S2 WNL, No S3, No Summation Gallop. Murmurs & Other Heart Sounds - Auscultation of the heart reveals - No Murmurs. Abdomen  Palpation/Percussion  Tenderness - Non-Tender. Rebound tenderness - No rebound. Rigidity (guarding) - No Rigidity. Dullness to percussion - No abnormal dullness to percussion. Liver - No hepatosplenomegaly. Abdominal Mass Palpable - No masses. Other Characteristics - No Ascites. Auscultation  Auscultation of the abdomen reveals - Bowel sounds normal, No Abdominal bruits and No Succussion splash. Rectal - Did not examine. Peripheral Vascular  Lower Extremity  Palpation - Edema - Left - No edema - Left. Edema - Right - No edema - Right. Neurologic  Motor  Involuntary Movements - Asterixis - not present. Assessment & Plan (Krunal Marquez AGACNP; 2/15/2022 10:59 AM)  Dysphagia (R13.10)  Story: Pt presents for worsening Dysphagia. She had an Advil get stuck her throat then had vomiting and pain. Impression: Advised her to take meds with pudding or crush meds. Continue pantoprazole and added carafate.  Advised soft texture diet. Will move up her EGD appt. Current Plans  Started Carafate 1 GM Oral Tablet, 1 (one) Tablet before meals and at bedtime, #120, 30 days starting 02/15/2022, Ref. x3.  Pt Education - How to access health information online: discussed with patient and provided information.   Signed by MARIA R Gavin (2/15/2022 11:00 AM)

## 2022-02-25 NOTE — PERIOP NOTES
Received Report From [facility]  @ [phone], see anesthesia notes. Care of the patient transferred to procedure nurse Ally Norris RN,@ 0258    Out of Procedure and sent to post-recovery @ 5087    Post-recovery report given to Ronni Moore RN @ 1898    Patient ABD remains soft and non-tender post procedure. Pt has no complaints at this time and tolerated the procedure well.      Endoscope was pre-cleaned at bedside immediately following procedure by [facility].

## 2022-02-25 NOTE — PROGRESS NOTES
Fern Giraldo  1960  [id number]    Situation:  Verbal report received from: MAIDA Elena  Procedure: Procedure(s):  ESOPHAGOGASTRODUODENOSCOPY (EGD)  ESOPHAGEAL DILATION  ESOPHAGOGASTRODUODENAL (EGD) BIOPSY    Background:    Preoperative diagnosis: EPIGASTRIC PAIN, DYSPHAGIA  Postoperative diagnosis: hiatal hernia  schatzki ring    :  Dr. Robbie De La Torre  Assistant(s): Endoscopy Technician-1: Esthela Cardoza  Endoscopy RN-1: Bogdan De La Cruz RN    Specimens:   ID Type Source Tests Collected by Time Destination   1 : Mid Esophagus Biopsy Preservative Esophagus, Mid  Palomo Pearson MD 2/25/2022 0831 Pathology     H. Pylori  no    Assessment:  Intra-procedure medications       Anesthesia gave intra-procedure sedation and medications, see anesthesia flow sheet yes    Intravenous fluids: NS@ KVO     Vital signs stable yes      Abdominal assessment: round and soft  yes    Recommendation:  Discharge patient per MD order yes.   Return to floor na  Family or Friend family  Permission to share finding with family or friend yes

## 2022-02-25 NOTE — DISCHARGE INSTRUCTIONS
Kaitlin Wilkerson M.D.  [phone]           2022  Jovita Grider  :  1960  [address] [place] [address] Medical Record Number:  [id number]        ENDOSCOPY FINDINGS:   Your endoscopy showed a benign mild narrowing at the bottom of the esophagus and a small size hiatal hernia. Dilation was performed at the top and bottom of the esophagus. EGD DISCHARGE INSTRUCTIONS    DISCOMFORT:  Sore throat- throat lozenges or warm salt water gargle  redness at IV site- apply warm compress to area; if redness or soreness persist- contact your physician  Gaseous discomfort- walking, belching will help relieve any discomfort  You may not operate a vehicle for 12 hours  You may not engage in an occupation involving machinery or appliances for rest of today  You may not drink alcoholic beverages for at least 12 hours  Avoid making any critical decisions for at least 24 hour    DIET:   You may resume your regular diet. ACTIVITY  Spend the remainder of the day resting -  avoid any strenuous activity. Avoid driving or operating machinery. CALL M.D. ANY SIGN OF   Increasing pain, nausea, vomiting  Abdominal distension (swelling)  New increased bleeding (oral or rectal)  Fever (chills)  Pain in chest area  Bloody discharge from nose or mouth  Shortness of breath    Follow-up Instructions:   Call Dr. Sloane Johns for any questions or problems. Telephone # [phone]  Biopsies were obtained, the results will be available  in  5 to 7 days. We will call you to notify you of these results. Continue same medications. Follow up in the office.

## 2022-02-25 NOTE — PROCEDURES
Arnaldo Champagne M.D.  [phone]           2022                EGD Operative Report  Gildardo Hauser  :  1960  Jn Ibrahim Medical Record Number:  [id number]      Indication:  Dysphagia/odynophagia     : Kenan Martinez MD    Referring Provider:  Caitlyn Collins MD      Anesthesia/Sedation:  MAC anesthesia    Airway assessment: No airway problems anticipated    Pre-Procedural Exam:      Airway: clear, no airway problems anticipated  Heart: RRR, without gallops or rubs  Lungs: clear bilaterally without wheezes, crackles, or rhonchi  Abdomen: soft, nontender, nondistended, bowel sounds present  Mental Status: awake, alert and oriented to person, place and time       Procedure Details     After infomed consent was obtained for the procedure, with all risks and benefits of procedure explained the patient was taken to the endoscopy suite and placed in the left lateral decubitus position. Following sequential administration of sedation as per above, the endoscope was inserted into the mouth and advanced under direct vision to second portion of the duodenum. A careful inspection was made as the gastroscope was withdrawn, including a retroflexed view of the proximal stomach; findings and interventions are described below. Findings:   Esophagus:Evidence of a patent schatzki's ring with a residual diameter of about 18 mm, otherwise mucosa within normal throughout the esophagus. Stomach: Small size hiatal hernia, otherwise mucosa within normal.  Duodenum/jejunum: normal    Therapies:  Empiric sophageal dilation with savary sizes 51 Fr for the UES as patient reported difficulty swallowing pills                       Effective esophageal dilation with 20 sized balloon, a small superficial tear could be seen after dilation    Specimens: Mid-esophagus           Complications:   None; patient tolerated the procedure well. EBL:  None.            Impression:    Small hiatal hernia                           schatzki ring dilated to 20 mm    Recommendations:    -Continue acid suppression.  -Await pathology.  -Continue with anti-reflux measures  -Follow-up office visit    Lin Stoner MD

## 2022-02-28 NOTE — ANESTHESIA POSTPROCEDURE EVALUATION
Procedure(s):  ESOPHAGOGASTRODUODENOSCOPY (EGD)  ESOPHAGEAL DILATION  ESOPHAGOGASTRODUODENAL (EGD) BIOPSY.     MAC    Anesthesia Post Evaluation      Multimodal analgesia: multimodal analgesia used between 6 hours prior to anesthesia start to PACU discharge  Patient location during evaluation: bedside  Patient participation: complete - patient participated  Level of consciousness: awake  Pain management: adequate  Airway patency: patent  Anesthetic complications: no  Cardiovascular status: acceptable  Respiratory status: acceptable  Hydration status: acceptable        INITIAL Post-op Vital signs:   Vitals Value Taken Time   /96 02/25/22 0855   Temp 36.5 °C (97.7 °F) 02/25/22 0840   Pulse 74 02/25/22 0855   Resp 20 02/25/22 0855   SpO2 97 % 02/25/22 0855

## 2022-03-15 ENCOUNTER — VIRTUAL VISIT (OUTPATIENT)
Dept: INTERNAL MEDICINE CLINIC | Age: 62
End: 2022-03-15
Payer: MEDICARE

## 2022-03-15 DIAGNOSIS — J01.41 ACUTE RECURRENT PANSINUSITIS: Primary | ICD-10-CM

## 2022-03-15 PROCEDURE — 3017F COLORECTAL CA SCREEN DOC REV: CPT | Performed by: INTERNAL MEDICINE

## 2022-03-15 PROCEDURE — G0463 HOSPITAL OUTPT CLINIC VISIT: HCPCS | Performed by: INTERNAL MEDICINE

## 2022-03-15 PROCEDURE — G9717 DOC PT DX DEP/BP F/U NT REQ: HCPCS | Performed by: INTERNAL MEDICINE

## 2022-03-15 PROCEDURE — G9899 SCRN MAM PERF RSLTS DOC: HCPCS | Performed by: INTERNAL MEDICINE

## 2022-03-15 PROCEDURE — 99213 OFFICE O/P EST LOW 20 MIN: CPT | Performed by: INTERNAL MEDICINE

## 2022-03-15 PROCEDURE — G8427 DOCREV CUR MEDS BY ELIG CLIN: HCPCS | Performed by: INTERNAL MEDICINE

## 2022-03-15 RX ORDER — METHOTREXATE 25 MG/ML
INJECTION, SOLUTION INTRA-ARTERIAL; INTRAMUSCULAR; INTRAVENOUS
COMMUNITY
Start: 2022-03-02

## 2022-03-15 RX ORDER — DOXYCYCLINE 100 MG/1
100 TABLET ORAL 2 TIMES DAILY
Qty: 14 TABLET | Refills: 0 | Status: SHIPPED | OUTPATIENT
Start: 2022-03-15 | End: 2022-03-22

## 2022-03-15 RX ORDER — SYRINGE WITH NEEDLE, 1 ML 28GX1/2"
SYRINGE, EMPTY DISPOSABLE MISCELLANEOUS
COMMUNITY
Start: 2022-03-03

## 2022-03-15 NOTE — PROGRESS NOTES
Mirian Perla is a 63 y.o. female who was seen by synchronous (real-time) audio-video technology on 3/15/2022. She was last seen by me on 6/26/20. She was scheduled for MWV today but came in sick so converted to urgent VV. Assessment & Plan:   Below is the assessment and plan developed based on review of pertinent history, physical exam (if applicable), labs, studies, and medications. 1. Acute recurrent pansinusitis  -     doxycycline (ADOXA) 100 mg tablet; Take 1 Tablet by mouth two (2) times a day for 7 days. , Normal, Disp-14 Tablet, R-0      This is a recurrent issue. We discussed differential diagnosis and at this time favor a viral etiology but sounds like it is transitioning to a bacterial infection. Treatment options reviewed, including prescription & OTC options. Will start meds above if symptoms do not continue to improve in 2-3 days. Red flags were reviewed, expected time course for resolution was discussed, and when to RTC or notify me of changes was discussed with her. Follow-up and Dispositions    · Return in about 2 months (around 5/15/2022) for FULL PHYSICAL - 15 minutes. Routing History       Subjective: Mirian Perla was seen for Nasal Congestion    URI Review  Evern Nearing returns to clinic today to talk about: URI symptoms that started 7 days ago, and had been gradually improving but has since plateaud since that time. She reports low grade fevers, sinus congestion and rhinorrhea. She denies a history of: headache, sinus pain, sore throat, hoarseness, SOB, FRIEND and cough. Treatments have included: Mucinex & OTC cough medication which have been somewhat effective. Relevant PMH: allergic rhinitis. Patient reports sick contacts: no.  She took an at home COVID test 4 days. Prior to Admission medications    Medication Sig Start Date End Date Taking?  Authorizing Provider   methotrexate 25 mg/mL chemo injection INJECT 0.6ML SUBCUTANEOUS 1 DAY A WEEK 3/2/22  Yes Provider, Historical BD Allergy Syringe 1 mL 28 gauge x 1/2\" syrg INJECT 0.6 ML UNDER THE SKIN ONCE A WEEK 3/3/22  Yes Provider, Historical   SUCRALFATE PO Take 1 g by mouth Before breakfast, lunch, and dinner. Yes Provider, Historical   cyclobenzaprine (FLEXERIL) 10 mg tablet TAKE 1 TABLET BY MOUTH THREE TIMES A DAY AS NEEDED FOR MUSCLE SPASMS 12/16/20  Yes Provider, Historical   cetirizine (ZYRTEC) 10 mg tablet Take 1 Tab by mouth daily. For allergy 1/13/21  Yes Kiera SHOEMAKER NP   diclofenac (VOLTAREN) 1 % gel APPLY 2G TO AFFECTED JOINT FOUR TIMES DAILY (MAX UP TO 8 G/ PER DAY) 4/23/20  Yes Provider, Historical   OTHER Refresh eye drops   Yes Provider, Historical   aspirin delayed-release 81 mg tablet Take  by mouth daily. Yes Provider, Historical   olopatadine (PATADAY) 0.2 % drop ophthalmic solution Administer 1 Drop to both eyes daily. Yes Provider, Historical   cholecalciferol (VITAMIN D3) (2,000 UNITS /50 MCG) cap capsule Take  by mouth two (2) times a day. Yes Provider, Historical   mometasone (NASONEX) 50 mcg/actuation nasal spray 2 SPRAYS BY BOTH NOSTRILS ROUTE DAILY. FOR ALLERGY SYMPTOMS 9/14/19  Yes Deyanira Muir MD   predniSONE (DELTASONE) 5 mg tablet Take 1 Tab by mouth daily. 12/4/18  Yes Love Waterman MD   folic acid (FOLVITE) 1 mg tablet TAKE 1 TABLET BY MOUTH ONCE DAILY 3/26/17  Yes Provider, Historical   ascorbic acid (VITAMIN C) 500 mg tablet Take  by mouth daily. Yes Provider, Historical   pantoprazole (PROTONIX) 40 mg tablet Take 40 mg by mouth daily. Patient not taking: Reported on 3/15/2022  3/15/22  Provider, Historical   methylPREDNISolone (MEDROL DOSEPACK) 4 mg tablet FOLLOW DOSE PACK INSTRUCTIONS  Patient not taking: Reported on 3/15/2022 1/13/21 3/15/22  Kiera SHOEMAKER NP   methotrexate (RHEUMATREX) 2.5 mg tablet every Tuesday. 4/21/17 3/15/22  Provider, Historical       Objective:   No flowsheet data found.   General: alert, cooperative, no distress   Mental  status: normal mood, behavior, speech, dress, motor activity, and thought processes, able to follow commands   Eyes: normal sclera   Mouth:    Neck:    Resp: normal effort and no respiratory distress   Neuro: no gross deficits   Musculoskeletal:    Skin:    Psychiatric:      Irving Horton, was evaluated through a synchronous (real-time) audio-video encounter. The patient (or guardian if applicable) is aware that this is a billable service. Verbal consent to proceed has been obtained within the past 12 months. The visit was conducted pursuant to the emergency declaration under the [address] [place] [address] authority and the [employer] Act. Patient identification was verified, and a caregiver was present when appropriate. The patient was located in a state where the provider was credentialed to provide care.      Shayan Mendosa MD

## 2022-03-15 NOTE — PROGRESS NOTES
Chief Complaint   Patient presents with    Nasal Congestion    GERD     follow up    Sleep Problem       1. \"Have you been to the ER, urgent care clinic since your last visit? Hospitalized since your last visit? \" No    2. \"Have you seen or consulted any other health care providers outside of the [address] [place] [address] since your last visit? \" No     3. For patients aged 39-70: Has the patient had a colonoscopy / FIT/ Cologuard? Yes - Care Gap present. Most recent result on file      If the patient is female:    4. For patients aged 41-77: Has the patient had a mammogram within the past 2 years? Yes - Care Gap present. Most recent result on file      5. For patients aged 21-65: Has the patient had a pap smear? Yes - Care Gap present.  Most recent result on file        Patient is ready for [employer] virtual visit

## 2022-03-15 NOTE — Clinical Note
Please call to schedule her a 15 min CPE in May. She prefers an afternoon appointment, any day of the week works for her.

## 2022-03-16 DIAGNOSIS — J30.1 SEASONAL ALLERGIC RHINITIS DUE TO POLLEN: ICD-10-CM

## 2022-03-16 RX ORDER — CETIRIZINE HYDROCHLORIDE 10 MG/1
TABLET, FILM COATED ORAL
Qty: 90 TABLET | Refills: 3 | Status: SHIPPED | OUTPATIENT
Start: 2022-03-16

## 2022-03-18 PROBLEM — E66.01 SEVERE OBESITY (HCC): Status: ACTIVE | Noted: 2019-05-08

## 2022-05-16 ENCOUNTER — OFFICE VISIT (OUTPATIENT)
Dept: INTERNAL MEDICINE CLINIC | Age: 62
End: 2022-05-16
Payer: MEDICARE

## 2022-05-16 VITALS
WEIGHT: 207 LBS | RESPIRATION RATE: 16 BRPM | DIASTOLIC BLOOD PRESSURE: 72 MMHG | OXYGEN SATURATION: 100 % | HEIGHT: 64 IN | SYSTOLIC BLOOD PRESSURE: 120 MMHG | BODY MASS INDEX: 35.34 KG/M2 | TEMPERATURE: 97.9 F | HEART RATE: 88 BPM

## 2022-05-16 DIAGNOSIS — Z00.00 MEDICARE ANNUAL WELLNESS VISIT, SUBSEQUENT: Primary | ICD-10-CM

## 2022-05-16 DIAGNOSIS — M06.9 RHEUMATOID ARTHRITIS INVOLVING MULTIPLE SITES, UNSPECIFIED WHETHER RHEUMATOID FACTOR PRESENT (HCC): ICD-10-CM

## 2022-05-16 DIAGNOSIS — K21.9 GASTROESOPHAGEAL REFLUX DISEASE WITHOUT ESOPHAGITIS: ICD-10-CM

## 2022-05-16 DIAGNOSIS — F33.42 RECURRENT MAJOR DEPRESSIVE DISORDER, IN FULL REMISSION (HCC): ICD-10-CM

## 2022-05-16 DIAGNOSIS — E66.01 SEVERE OBESITY (HCC): ICD-10-CM

## 2022-05-16 DIAGNOSIS — Z71.89 ADVANCED CARE PLANNING/COUNSELING DISCUSSION: ICD-10-CM

## 2022-05-16 DIAGNOSIS — Z13.6 SCREENING FOR ISCHEMIC HEART DISEASE: ICD-10-CM

## 2022-05-16 PROCEDURE — G9899 SCRN MAM PERF RSLTS DOC: HCPCS | Performed by: INTERNAL MEDICINE

## 2022-05-16 PROCEDURE — G8427 DOCREV CUR MEDS BY ELIG CLIN: HCPCS | Performed by: INTERNAL MEDICINE

## 2022-05-16 PROCEDURE — G0439 PPPS, SUBSEQ VISIT: HCPCS | Performed by: INTERNAL MEDICINE

## 2022-05-16 PROCEDURE — G9717 DOC PT DX DEP/BP F/U NT REQ: HCPCS | Performed by: INTERNAL MEDICINE

## 2022-05-16 PROCEDURE — G8417 CALC BMI ABV UP PARAM F/U: HCPCS | Performed by: INTERNAL MEDICINE

## 2022-05-16 PROCEDURE — 3017F COLORECTAL CA SCREEN DOC REV: CPT | Performed by: INTERNAL MEDICINE

## 2022-05-16 RX ORDER — PANTOPRAZOLE SODIUM 40 MG/1
40 TABLET, DELAYED RELEASE ORAL DAILY
COMMUNITY
Start: 2022-05-16

## 2022-05-16 NOTE — PATIENT INSTRUCTIONS
Medicare Wellness Visit, Female     The best way to live healthy is to have a lifestyle where you eat a well-balanced diet, exercise regularly, limit alcohol use, and quit all forms of tobacco/nicotine, if applicable. Regular preventive services are another way to keep healthy. Preventive services (vaccines, screening tests, monitoring & exams) can help personalize your care plan, which helps you manage your own care. Screening tests can find health problems at the earliest stages, when they are easiest to treat. [facility] follows the current, evidence-based guidelines published by the [facility] Rosas Chung ([facility]) when recommending preventive services for our patients. Because we follow these guidelines, sometimes recommendations change over time as research supports it. (For example, mammograms used to be recommended annually. Even though Medicare will still pay for an annual mammogram, the newer guidelines recommend a mammogram every two years for women of average risk). Of course, you and your doctor may decide to screen more often for some diseases, based on your risk and your co-morbidities (chronic disease you are already diagnosed with). Preventive services for you include:  - Medicare offers their members a free annual wellness visit, which is time for you and your primary care provider to discuss and plan for your preventive service needs. Take advantage of this benefit every year!  -All adults over the age of 72 should receive the recommended pneumonia vaccines. Current USPSTF guidelines recommend a series of two vaccines for the best pneumonia protection.   -All adults should have a flu vaccine yearly and a tetanus vaccine every 10 years.   -All adults age 48 and older should receive the shingles vaccines (series of two vaccines).       -All adults age 38-68 who are overweight should have a diabetes screening test once every three years.   -All adults born between [birth year] and 1963 should be screened once for Hepatitis C.  -Other screening tests and preventive services for persons with diabetes include: an eye exam to screen for diabetic retinopathy, a kidney function test, a foot exam, and stricter control over your cholesterol.   -Cardiovascular screening for adults with routine risk involves an electrocardiogram (ECG) at intervals determined by your doctor.   -Colorectal cancer screenings should be done for adults age 54-65 with no increased risk factors for colorectal cancer. There are a number of acceptable methods of screening for this type of cancer. Each test has its own benefits and drawbacks. Discuss with your doctor what is most appropriate for you during your annual wellness visit. The different tests include: colonoscopy (considered the best screening method), a fecal occult blood test, a fecal DNA test, and sigmoidoscopy.    -A bone mass density test is recommended when a woman turns 65 to screen for osteoporosis. This test is only recommended one time, as a screening. Some providers will use this same test as a disease monitoring tool if you already have osteoporosis. -Breast cancer screenings are recommended every other year for women of normal risk, age 54-69.  -Cervical cancer screenings for women over age 72 are only recommended with certain risk factors. Here is a list of your current Health Maintenance items (your personalized list of preventive services) with a due date:  Health Maintenance Due   Topic Date Due    Shingles Vaccine (1 of 2) Never done    COVID-19 Vaccine (3 - Booster for Elfida Dinning series) 01/25/2022    Cholesterol Test   01/27/2022              Learning About Living [place]ha  What is a living will? A living will is a legal form you use to write down the kind of care you want at the end of your life. It is used by the health professionals who will treat you if you aren't able to decide for yourself.   If you put your wishes in writing, your loved ones and others will know what kind of care you want. They won't need to guess. This can ease your mind and be helpful to others. A living will is not the same as an estate or property will. An estate will explains what you want to happen with your money and property after you die. Is a living will a legal document? A living will is a legal document. Each state has its own laws about living silva. If you move to another state, make sure that your living will is legal in the state where you now live. Or you might use a universal form that has been approved by many states. This kind of form can sometimes be completed and stored online. Your electronic copy will then be available wherever you have a connection to the Internet. In most cases, doctors will respect your wishes even if you have a form from a different state. · You don't need an  to complete a living will. But legal advice can be helpful if your state's laws are unclear, your health history is complicated, or your family can't agree on what should be in your living will. · You can change your living will at any time. Some people find that their wishes about end-of-life care change as their health changes. · In addition to making a living will, think about completing a medical power of  form. This form lets you name the person you want to make end-of-life treatment decisions for you (your \"health care agent\") if you're not able to. Many hospitals and nursing homes will give you the forms you need to complete a living will and a medical power of . · Your living will is used only if you can't make or communicate decisions for yourself anymore. If you become able to make decisions again, you can accept or refuse any treatment, no matter what you wrote in your living will. · Your state may offer an online registry.  This is a place where you can store your living will online so the doctors and nurses who need to treat you can find it right away. What should you think about when creating a living will? Talk about your end-of-life wishes with your family members and your doctor. Let them know what you want. That way the people making decisions for you won't be surprised by your choices. Think about these questions as you make your living will:  · Do you know enough about life support methods that might be used? If not, talk to your doctor so you know what might be done if you can't breathe on your own, your heart stops, or you're unable to swallow. · What things would you still want to be able to do after you receive life-support methods? Would you want to be able to walk? To speak? To eat on your own? To live without the help of machines? · If you have a choice, where do you want to be cared for? In your home? At a hospital or nursing home? · Do you want certain [employer] practices performed if you become very ill? · If you have a choice at the end of your life, where would you prefer to die? At home? In a hospital or nursing home? Somewhere else? · Would you prefer to be buried or cremated? · Do you want your organs to be donated after you die? What should you do with your living will? · Make sure that your family members and your health care agent have copies of your living will. · Give your doctor a copy of your living will to keep in your medical record. If you have more than one doctor, make sure that each one has a copy. · You may want to put a copy of your living will where it can be easily found. Where can you learn more? Go to http://www.gutiérrez.com/. Enter N266 in the search box to learn more about \"Learning About Living [place]. \"  Current as of: August 8, 2016  Content Version: 11.3  © 9719-1919 [employer]. Care instructions adapted under license by [employer] (which disclaims liability or warranty for this information).  If you have questions about a medical condition or this instruction, always ask your healthcare professional. [place] [address] any warranty or liability for your use of this information.

## 2022-05-16 NOTE — ACP (ADVANCE CARE PLANNING)
Advance Care Planning   Advance Care Planning (ACP) Physician/NP/PA (Provider) Conversation    Date of ACP Conversation: 05/16/22  Persons included in Conversation:  patient  Length of ACP Conversation in minutes: <16 minutes (Non-Billable)    Authorized Decision Maker (if patient is incapable of making informed decisions):   Next of Kin by law (only applies in absence of a Healthcare Power of  or Legal Guardian)      Primary Decision Maker: Elizabeth Bal - [facility] - [phone]    She has NO advanced directive - recommended completing forms. Reviewed DNR/DNI and patient is not interested- elects Full Code (attempt resuscitation).        Ganga Oden MD

## 2022-05-16 NOTE — PROGRESS NOTES
Syed Mejia is a 63 y.o. female who was seen in clinic today (5/16/2022) for a full physical.             Assessment & Plan:   Below is the assessment and plan developed based on review of pertinent history, physical exam, labs, studies, and medications. 1. Medicare annual wellness visit, subsequent  2. Advanced care planning/counseling discussion  3. Screening for ischemic heart disease  -     LIPID PANEL; Future  4. Severe obesity ([address]  Assessment & Plan:  Unchanged, overall stable, I have recommended the following interventions: encourage exercise and lifestyle education regarding diet. 5. Gastroesophageal reflux disease without esophagitis  Assessment & Plan:  well controlled, she is taking her medication(s) as directed & without any side effects, continue current treatment    6. Rheumatoid arthritis involving multiple sites, unspecified whether rheumatoid factor present [facility])  Assessment & Plan:  Stable, monitored by specialist. No acute findings meriting change in the plan  7. Recurrent major depressive disorder, in full remission [facility])  Assessment & Plan:  Well controlled off medications, no indication to restart, continue to monitor     Follow-up and Dispositions    · Return in about 1 year (around 5/16/2023), or if symptoms worsen or fail to improve. Subjective: [facility] is here today for a full physical.      Annual Wellness Visit- Subsequent Visit    Since last visit: weight has decreased       End of Life Planning: This was discussed with her today and she has NO advanced directive  - add't info provided. Reviewed DNR/DNI and patient is not interested. Depression Screen:  3 most recent PHQ Screens 3/15/2022   Little interest or pleasure in doing things Not at all   Feeling down, depressed, irritable, or hopeless Not at all   Total Score PHQ 2 0         Fall Risk:   Fall Risk Assessment, last 12 mths 6/26/2020   Able to walk? Yes   Fall in past 12 months?  No       Abuse Screen:  Abuse Screening Questionnaire 6/26/2020   Do you ever feel afraid of your partner? N   Are you in a relationship with someone who physically or mentally threatens you? N   Is it safe for you to go home? Y       Do you average more than 1 drink per night or more than 7 drinks a week:  No    On any one occasion in the past three months have you have had more than 3 drinks containing alcohol:  No          Health Maintenance:   Daily Aspirin: yes  Bone Density: 8/21/20 - normal    Immunizations:   Covid: not up to date - she will get booster  Influenza: declined  Tetanus: up to date  Shingles: due for Shingrix - script provided  Pneumonia: n/a  Cancer screening:   Cervical: reviewed guidelines, n/a - s/p hysterectomy  Breast: reviewed guidelines, up to date  Colon: reviewed guidelines, up to date       Functional Ability and Level of Safety:    Hearing: Hearing is good. Cognition Screen:   Has your family/caregiver stated any concerns about your memory: no  Cognitive Screening: Normal - Clock Drawing Test     Ambulation: with no difficulty     Activities of Daily Living: The home contains: no safety equipment. Patient does total self care  Exercise: moderately active    Adult Nutrition Screen:  No risk factors noted. Patient Care Team:  Nanda Kirk MD as PCP - General (Internal Medicine Physician)  Nanda Kirk MD as PCP - [facility] Empaneled Provider  Ryder Rosario MD (Rheumatology Internal Medicine)  Seble Hernandez NP (Rheumatology Internal Medicine)  Ivonne Galvan MD (Otolaryngology)       The following sections were reviewed & updated as appropriate: Problem List, Allergies, PMH, PSH, FH, and SH. Prior to Admission medications    Medication Sig Start Date End Date Taking? Authorizing Provider   Allergy Relief, cetirizine, 10 mg tablet TAKE 1 TAB BY MOUTH DAILY.  FOR ALLERGY 3/16/22   Lesia SHOEMAKER NP   methotrexate 25 mg/mL chemo injection INJECT 0.6ML SUBCUTANEOUS 1 DAY A WEEK 3/2/22   Provider, Historical   BD Allergy Syringe 1 mL 28 gauge x 1/2\" syrg INJECT 0.6 ML UNDER THE SKIN ONCE A WEEK 3/3/22   Provider, Historical   SUCRALFATE PO Take 1 g by mouth Before breakfast, lunch, and dinner. Provider, Historical   cyclobenzaprine (FLEXERIL) 10 mg tablet TAKE 1 TABLET BY MOUTH THREE TIMES A DAY AS NEEDED FOR MUSCLE SPASMS 12/16/20   Provider, Historical   diclofenac (VOLTAREN) 1 % gel APPLY 2G TO AFFECTED JOINT FOUR TIMES DAILY (MAX UP TO 8 G/ PER DAY) 4/23/20   Provider, Historical   OTHER Refresh eye drops    Provider, Historical   aspirin delayed-release 81 mg tablet Take  by mouth daily. Provider, Historical   olopatadine (PATADAY) 0.2 % drop ophthalmic solution Administer 1 Drop to both eyes daily. Provider, Historical   cholecalciferol (VITAMIN D3) (2,000 UNITS /50 MCG) cap capsule Take  by mouth two (2) times a day. Provider, Historical   mometasone (NASONEX) 50 mcg/actuation nasal spray 2 SPRAYS BY BOTH NOSTRILS ROUTE DAILY. FOR ALLERGY SYMPTOMS 9/14/19   Karin Mayo MD   predniSONE (DELTASONE) 5 mg tablet Take 1 Tab by mouth daily. 12/4/18   Pricila Rodriguez MD   folic acid (FOLVITE) 1 mg tablet TAKE 1 TABLET BY MOUTH ONCE DAILY 3/26/17   Provider, Historical   ascorbic acid (VITAMIN C) 500 mg tablet Take  by mouth daily. Provider, Historical          Review of Systems   Constitutional: Negative for malaise/fatigue and weight loss. HENT: Negative for congestion (resolved, but not as quickly as she would have expected with antibiotic). Respiratory: Negative for cough and shortness of breath. Cardiovascular: Negative for chest pain, palpitations and leg swelling. Gastrointestinal: Negative for abdominal pain, constipation, diarrhea, heartburn, nausea and vomiting. Musculoskeletal: Negative for joint pain (diffuse on/off) and myalgias. Skin: Negative for rash. Neurological: Negative for dizziness and headaches.    Psychiatric/Behavioral: Negative for depression. The patient is not nervous/anxious and does not have insomnia. Objective:   Physical Exam  Constitutional:       General: She is not in acute distress. Appearance: Normal appearance. She is obese. Eyes:      Conjunctiva/sclera: Conjunctivae normal.   Cardiovascular:      Rate and Rhythm: Regular rhythm. Heart sounds: No murmur heard. Pulmonary:      Effort: Pulmonary effort is normal.      Breath sounds: Normal breath sounds. No decreased breath sounds or wheezing. Abdominal:      General: Bowel sounds are normal.      Palpations: Abdomen is soft. Tenderness: There is no abdominal tenderness. Musculoskeletal:      Right lower leg: No edema. Left lower leg: No edema.    Psychiatric:         Mood and Affect: Mood and affect normal.            Visit Vitals  /72 (BP 1 Location: Left upper arm, BP Patient Position: Sitting, BP Cuff Size: Adult)   Pulse 88   Temp 97.9 °F (36.6 °C) (Temporal)   Resp 16   Ht 5' 4\" (1.626 m)   Wt 207 lb (93.9 kg)   LMP 10/31/1986 Comment: Partial hysterectomy   SpO2 100%   BMI 35.53 kg/m²          Dipesh Wells MD

## 2022-05-17 NOTE — ASSESSMENT & PLAN NOTE
Unchanged, overall stable, I have recommended the following interventions: encourage exercise and lifestyle education regarding diet.

## 2022-06-24 ENCOUNTER — HOSPITAL ENCOUNTER (EMERGENCY)
Age: 62
Discharge: HOME | End: 2022-06-24
Attending: EMERGENCY MEDICINE
Payer: MEDICARE

## 2022-06-24 VITALS
OXYGEN SATURATION: 99 % | SYSTOLIC BLOOD PRESSURE: 144 MMHG | HEART RATE: 89 BPM | TEMPERATURE: 98.5 F | RESPIRATION RATE: 16 BRPM | DIASTOLIC BLOOD PRESSURE: 92 MMHG

## 2022-06-24 DIAGNOSIS — F43.21 GRIEF REACTION: Primary | ICD-10-CM

## 2022-06-24 LAB
ALBUMIN SERPL-MCNC: 3.9 G/DL (ref 3.5–5)
ALBUMIN/GLOB SERPL: 1 {RATIO} (ref 1.1–2.2)
ALP SERPL-CCNC: 135 U/L (ref 45–117)
ALT SERPL-CCNC: 32 U/L (ref 12–78)
ANION GAP SERPL CALC-SCNC: 10 MMOL/L (ref 5–15)
AST SERPL-CCNC: 22 U/L (ref 15–37)
BASOPHILS # BLD: 0 K/UL (ref 0–0.1)
BASOPHILS NFR BLD: 0 % (ref 0–1)
BILIRUB SERPL-MCNC: 0.3 MG/DL (ref 0.2–1)
BUN SERPL-MCNC: 12 MG/DL (ref 6–20)
BUN/CREAT SERPL: 11 (ref 12–20)
CALCIUM SERPL-MCNC: 10.3 MG/DL (ref 8.5–10.1)
CHLORIDE SERPL-SCNC: 109 MMOL/L (ref 97–108)
CO2 SERPL-SCNC: 21 MMOL/L (ref 21–32)
COMMENT, HOLDF: NORMAL
CREAT SERPL-MCNC: 1.12 MG/DL (ref 0.55–1.02)
DIFFERENTIAL METHOD BLD: ABNORMAL
EOSINOPHIL # BLD: 0.1 K/UL (ref 0–0.4)
EOSINOPHIL NFR BLD: 1 % (ref 0–7)
ERYTHROCYTE [DISTWIDTH] IN BLOOD BY AUTOMATED COUNT: 14.8 % (ref 11.5–14.5)
GLOBULIN SER CALC-MCNC: 4 G/DL (ref 2–4)
GLUCOSE SERPL-MCNC: 113 MG/DL (ref 65–100)
HCT VFR BLD AUTO: 40 % (ref 35–47)
HGB BLD-MCNC: 13.9 G/DL (ref 11.5–16)
IMM GRANULOCYTES # BLD AUTO: 0 K/UL (ref 0–0.04)
IMM GRANULOCYTES NFR BLD AUTO: 0 % (ref 0–0.5)
LYMPHOCYTES # BLD: 2.2 K/UL (ref 0.8–3.5)
LYMPHOCYTES NFR BLD: 30 % (ref 12–49)
MCH RBC QN AUTO: 28.3 PG (ref 26–34)
MCHC RBC AUTO-ENTMCNC: 34.8 G/DL (ref 30–36.5)
MCV RBC AUTO: 81.5 FL (ref 80–99)
MONOCYTES # BLD: 0.5 K/UL (ref 0–1)
MONOCYTES NFR BLD: 6 % (ref 5–13)
NEUTS SEG # BLD: 4.5 K/UL (ref 1.8–8)
NEUTS SEG NFR BLD: 63 % (ref 32–75)
NRBC # BLD: 0 K/UL (ref 0–0.01)
NRBC BLD-RTO: 0 PER 100 WBC
PLATELET # BLD AUTO: 381 K/UL (ref 150–400)
PMV BLD AUTO: 9.5 FL (ref 8.9–12.9)
POTASSIUM SERPL-SCNC: 4.1 MMOL/L (ref 3.5–5.1)
PROT SERPL-MCNC: 7.9 G/DL (ref 6.4–8.2)
RBC # BLD AUTO: 4.91 M/UL (ref 3.8–5.2)
SAMPLES BEING HELD,HOLD: NORMAL
SODIUM SERPL-SCNC: 140 MMOL/L (ref 136–145)
TROPONIN-HIGH SENSITIVITY: 9 NG/L (ref 0–51)
WBC # BLD AUTO: 7.3 K/UL (ref 3.6–11)

## 2022-06-24 PROCEDURE — 85025 COMPLETE CBC W/AUTO DIFF WBC: CPT

## 2022-06-24 PROCEDURE — 84484 ASSAY OF TROPONIN QUANT: CPT

## 2022-06-24 PROCEDURE — 80053 COMPREHEN METABOLIC PANEL: CPT

## 2022-06-24 PROCEDURE — 36415 COLL VENOUS BLD VENIPUNCTURE: CPT

## 2022-06-24 PROCEDURE — 99284 EMERGENCY DEPT VISIT MOD MDM: CPT

## 2022-06-24 PROCEDURE — 93005 ELECTROCARDIOGRAM TRACING: CPT

## 2022-06-24 PROCEDURE — 74011250637 HC RX REV CODE- 250/637: Performed by: EMERGENCY MEDICINE

## 2022-06-24 RX ORDER — ALPRAZOLAM 0.5 MG/1
0.5 TABLET ORAL
Status: COMPLETED | OUTPATIENT
Start: 2022-06-24 | End: 2022-06-24

## 2022-06-24 RX ORDER — ALPRAZOLAM 0.5 MG/1
0.5 TABLET ORAL
Qty: 15 TABLET | Refills: 0 | Status: SHIPPED | OUTPATIENT
Start: 2022-06-24 | End: 2022-10-27 | Stop reason: ALTCHOICE

## 2022-06-24 RX ADMIN — ALPRAZOLAM 0.5 MG: 0.5 TABLET ORAL at 08:31

## 2022-06-24 NOTE — PROGRESS NOTES
Spiritual Care Assessment/Progress Note  [facility]      NAME: Ruby Conley      MRN: [id number]  AGE: 63 y.o.  SEX: female  Sabianist Affiliation: Jainism   Language: English     6/24/2022     Total Time (in minutes): 40     Spiritual Assessment begun in [address] through conversation with:         [x]Patient        [x] Family    [] Friend(s)        Reason for Consult: Grief concerns     Spiritual beliefs: (Please include comment if needed)     [x] Identifies with a ted tradition:         [] Supported by a ted community:            [] Claims no spiritual orientation:           [] Seeking spiritual identity:                [] Adheres to an individual form of spirituality:           [] Not able to assess:                           Identified resources for coping:      [x] Prayer                               [] Music                  [] Guided Imagery     [x] Family/friends                 [] Pet visits     [] Devotional reading                         [] Unknown     [] Other:                                               Interventions offered during this visit: (See comments for more details)    Patient Interventions: Affirmation of emotions/emotional suffering,Coping skills reviewed/reinforced,Initial/Spiritual assessment, patient floor,Life review/legacy,Prayer (actual),Prayer (assurance of)     Family/Friend(s): Prayer (actual),Prayer (assurance of),Catharsis/review of pertinent events in supportive environment     Plan of Care:     [] Support spiritual and/or cultural needs    [] Support AMD and/or advance care planning process      [] Support grieving process   [] Coordinate Rites and/or Rituals    [] Coordination with community clergy   [] No spiritual needs identified at this time   [] Detailed Plan of Care below (See Comments)  [] Make referral to Music Therapy  [] Make referral to Pet Therapy     [] Make referral to Addiction services  [] Make referral to [facility]  [] Make referral to Spiritual Care Partner  [] No future visits requested        [x] Contact Spiritual Care for further referrals     Comments: Provided support for this pt in [facility] ED5. This visit is the result of a hand-off from [facility] and an admission resulting from the grief response from the death of pt's son who  only hours prior to pt's admission. Facilitated life review to assess pt's grief support needs. Pt offered review of events leading to current situation. Provided support as pt processed the critical medical journey of pt's sister and the sudden death of her son and the relief and grief impact that has had on this pt. Family entered the room during this visit which seem to offer pt some added support and comfort. Offered prayer for pt and family. Assured pt of continued prayers. Henny Lance MDiv.  Staff   Request  Support/Spiritual Care Services on [phone])

## 2022-06-24 NOTE — DISCHARGE INSTRUCTIONS
Follow closely with your providers and return to the emergency department for any new or worsening symptoms.

## 2022-06-24 NOTE — Clinical Note
[address]  [address] [facility] [address]  [phone]    Work/School Note    Date: 6/24/2022    To Whom It May concern: Syed Mejia was seen and treated today in the emergency room by the following provider(s):  Attending Provider: Amada Goel MD.      Syed Mejia is excused from work/school on 6/24/2022 through 6/26/2022. She is medically clear to return to work/school on 6/27/2022.          Sincerely,          Mariann Jacobo MD

## 2022-06-24 NOTE — ED TRIAGE NOTES
Patient's son passed away early this AM in this ER and she began to complain of chest pain. She states \"it is just her nerves\" MD informed of need for anxiety medication.

## 2022-06-24 NOTE — ED PROVIDER NOTES
Patient is a 30-year-old female who presents to the emergency department after being notified that her son who passed away this morning. Patient started having chest pain and shortness of breath. She states she has not been diagnosed with any coronary artery disease in the past.  Denies preceding symptoms. Past Medical History:   Diagnosis Date    Acute meniscal tear of knee 08/15/2017    Dr. Jesus Shine    Acute torn meniscus of knee     Right knee    Allergic rhinitis 6/16/2014    Anxiety 7/14/2014    Arthritis     Bone spur     Left shoulder    Dense breast tissue on mammogram     Depression 7/14/2014    Family history of premature CAD 7/15/2015    GERD (gastroesophageal reflux disease) 9/10/2014    Headache     Hx of mammogram April 2014    WNL per pt.  Hyperlipidemia 7/28/2015    Insomnia 7/14/2014    Neuralgia     Occipital neuralgia    Occipital neuralgia 6/16/2014    RA (rheumatoid arthritis) ([facility] [address]     Screening for malignant neoplasm of the cervix Approx. 7 yrs ago    Negative per pt.        Past Surgical History:   Procedure Laterality Date    COLONOSCOPY N/A 8/13/2020    diverticulosis - performed by Sil See MD at [address] [place] [address] HX ENDOSCOPY  2019    esophageal dilation with 20mm sized balloon    HX ENDOSCOPY  02/25/2022    s/p dilation    HX HYSTERECTOMY      HX KNEE ARTHROSCOPY Right 04/16/2014    HX PELVIC LAPAROSCOPY  2001    Laproscopy of abdomen - removal of scar tissue    HX SHOULDER ARTHROSCOPY Left 06/2013    HX SHOULDER ARTHROSCOPY Left 03/2013    Shaved bone spur     HX TOTAL ABDOMINAL HYSTERECTOMY  1999         Family History:   Problem Relation Age of Onset    Diabetes Mother     Cancer Mother         cervical    Heart Disease Father 80        MI    Prostate Cancer Father     Heart Attack Brother 62        MI    Breast Cancer Sister     Diabetes Sister     Breast Cancer Sister     Lung Cancer Sister     Diabetes Sister     Hypertension Sister     Throat cancer Sister     No Known Problems Sister     Leukemia Brother     Hypertension Brother     Other Brother         prediabetic    Lung Cancer Brother     Heart Failure Brother     Thyroid Disease Daughter     No Known Problems Daughter         unknown issues    No Known Problems Son     Heart Disease Paternal Grandmother     Heart Attack Sister         MI       Social History     Socioeconomic History    Marital status:      Spouse name: Not on file    Number of children: Not on file    Years of education: Not on file    Highest education level: Not on file   Occupational History    Not on file   Tobacco Use    Smoking status: Never Smoker    Smokeless tobacco: Never Used   Vaping Use    Vaping Use: Never used   Substance and Sexual Activity    Alcohol use: Not Currently     Alcohol/week: 5.0 standard drinks     Types: 5 Standard drinks or equivalent per week     Comment: Beer and wine    Drug use: No    Sexual activity: Yes     Partners: Male     Birth control/protection: Surgical   Other Topics Concern    Dental Braces Not Asked    Endoscopic Camera Pill Not Asked    Metallic Foreign Body Not Asked    Medication Patches Not Asked    Taking Feraheme Not Asked    Claustrophobic Not Asked    Removable Dental Work Not Asked    Hearing Aids Not Asked    Body Piercing Not Asked    Radiation Seeds Not Asked    Pregnant or Breast Feeding Not Asked    Wounded by Shrapnel or Bullet Not Asked    Other-See Comment Not Asked    Other Implant-See Comment Not Asked   Social History Narrative    Not on file     Social Determinants of Health     Financial Resource Strain:     Difficulty of Paying Living Expenses: Not on file   Food Insecurity:     Worried About Running Out of Food in the Last Year: Not on file    Ally of Food in the Last Year: Not on file   Transportation Needs:     Lack of Transportation (Medical):  Not on file    Lack of Transportation (Non-Medical): Not on file   Physical Activity:     Days of Exercise per Week: Not on file    Minutes of Exercise per Session: Not on file   Stress:     Feeling of Stress : Not on file   Social Connections:     Frequency of Communication with Friends and Family: Not on file    Frequency of Social Gatherings with Friends and Family: Not on file    Attends Yazdanism Services: Not on file    Active Member of [address] [place] [address] or Organizations: Not on file    Attends Club or Organization Meetings: Not on file    Marital Status: Not on file   Intimate Partner Violence:     Fear of Current or Ex-Partner: Not on file    Emotionally Abused: Not on file    Physically Abused: Not on file    Sexually Abused: Not on file   Housing Stability:     Unable to Pay for Housing in the Last Year: Not on file    Number of [place] in the Last Year: Not on file    Unstable Housing in the Last Year: Not on file         ALLERGIES: Percocet [oxycodone-acetaminophen]    Review of Systems   Constitutional: Negative for fever. HENT: Negative for drooling. Cardiovascular: Positive for chest pain. Gastrointestinal: Negative for abdominal pain. Musculoskeletal: Negative for back pain. Neurological: Positive for light-headedness. There were no vitals filed for this visit. Physical Exam  Vitals and nursing note reviewed. Constitutional:       General: She is in acute distress. Appearance: She is not toxic-appearing or diaphoretic. HENT:      Head: Atraumatic. Neck:      Trachea: No tracheal deviation. Cardiovascular:      Rate and Rhythm: Normal rate. Heart sounds: No murmur heard. Pulmonary:      Effort: Tachypnea present. Breath sounds: Normal breath sounds. No stridor. Chest:      Chest wall: No deformity or crepitus. Abdominal:      Palpations: Abdomen is soft. Tenderness: There is no abdominal tenderness. There is no guarding.    Musculoskeletal: Cervical back: Neck supple. Right lower leg: No tenderness. No edema. Left lower leg: No edema. Skin:     General: Skin is warm and dry. Neurological:      Mental Status: She is alert and oriented to person, place, and time. Psychiatric:         Attention and Perception: Attention and perception normal.         Mood and Affect: Affect is tearful. Speech: Speech normal.         Behavior: Behavior normal. Behavior is cooperative. Thought Content: Thought content normal.          MDM  Number of Diagnoses or Management Options  Grief reaction  Diagnosis management comments: Patient notes feeling much better after Xanax. Patient with significant loss today resulting in what appears to be grief reaction. Low suspicion for ACS. Patient given strict return precautions and given resources for grief counseling. Good family support.        Amount and/or Complexity of Data Reviewed  Clinical lab tests: reviewed and ordered  Decide to obtain previous medical records or to obtain history from someone other than the patient: yes      ED Course as of 06/24/22 0941   Fri Jun 24, 2022   0938 EKG obtained at 807 showing sinus tachycardia, rate 107, LVH, septal Q waves, nonspecific T wave inversion in lead III [JE]      ED Course User Index  [JE] Gearl Najjar, MD       Procedures

## 2022-06-25 LAB
ATRIAL RATE: 105 BPM
CALCULATED P AXIS, ECG09: 44 DEGREES
CALCULATED R AXIS, ECG10: -18 DEGREES
CALCULATED T AXIS, ECG11: 4 DEGREES
DIAGNOSIS, 93000: NORMAL
P-R INTERVAL, ECG05: 162 MS
Q-T INTERVAL, ECG07: 344 MS
QRS DURATION, ECG06: 76 MS
QTC CALCULATION (BEZET), ECG08: 454 MS
VENTRICULAR RATE, ECG03: 105 BPM

## 2022-07-06 ENCOUNTER — TELEPHONE (OUTPATIENT)
Dept: INTERNAL MEDICINE CLINIC | Age: 62
End: 2022-07-06

## 2022-07-06 NOTE — TELEPHONE ENCOUNTER
Finesse Garcia (Self) [phone] (M)     Reason for call:  Pt says that her son passed away on Friday and she is having a hard time getting through everything .   She went to [facility]'s er and was given a rx for xanax, but is running out of this med     Is this a new problem: yes     Date of last appointment:  5/16/2022     Can we respond via [employer]: no    Best call back number:    Finesse Garcia (Self) [phone] Christina Gamez

## 2022-07-06 NOTE — TELEPHONE ENCOUNTER
Left detailed message to call back and schedule an appt either virtual or in person with Dr Berta Joshi for grief.

## 2022-07-07 ENCOUNTER — VIRTUAL VISIT (OUTPATIENT)
Dept: INTERNAL MEDICINE CLINIC | Age: 62
End: 2022-07-07
Payer: MEDICARE

## 2022-07-07 DIAGNOSIS — F43.21 GRIEF AT LOSS OF CHILD: Primary | ICD-10-CM

## 2022-07-07 DIAGNOSIS — Z63.4 GRIEF AT LOSS OF CHILD: Primary | ICD-10-CM

## 2022-07-07 PROCEDURE — G0463 HOSPITAL OUTPT CLINIC VISIT: HCPCS | Performed by: INTERNAL MEDICINE

## 2022-07-07 PROCEDURE — 3017F COLORECTAL CA SCREEN DOC REV: CPT | Performed by: INTERNAL MEDICINE

## 2022-07-07 PROCEDURE — G8417 CALC BMI ABV UP PARAM F/U: HCPCS | Performed by: INTERNAL MEDICINE

## 2022-07-07 PROCEDURE — G8427 DOCREV CUR MEDS BY ELIG CLIN: HCPCS | Performed by: INTERNAL MEDICINE

## 2022-07-07 PROCEDURE — G9717 DOC PT DX DEP/BP F/U NT REQ: HCPCS | Performed by: INTERNAL MEDICINE

## 2022-07-07 PROCEDURE — G9899 SCRN MAM PERF RSLTS DOC: HCPCS | Performed by: INTERNAL MEDICINE

## 2022-07-07 PROCEDURE — 99213 OFFICE O/P EST LOW 20 MIN: CPT | Performed by: INTERNAL MEDICINE

## 2022-07-07 RX ORDER — METHOTREXATE 25 MG/ML
INJECTION INTRA-ARTERIAL; INTRAMUSCULAR; INTRATHECAL; INTRAVENOUS
COMMUNITY
Start: 2022-06-03 | End: 2022-10-03 | Stop reason: SDUPTHER

## 2022-07-07 RX ORDER — LORAZEPAM 0.5 MG/1
0.5 TABLET ORAL
Qty: 10 TABLET | Refills: 0 | Status: SHIPPED | OUTPATIENT
Start: 2022-07-07 | End: 2022-10-27 | Stop reason: ALTCHOICE

## 2022-07-07 SDOH — SOCIAL STABILITY - SOCIAL INSECURITY: DISSAPEARANCE AND DEATH OF FAMILY MEMBER: Z63.4

## 2022-07-07 NOTE — PATIENT INSTRUCTIONS
Ativan 0.5 mg twice daily as needed for anxiety #10 tabs  Referral to [facility] for [facility]rief Counseling  follow up with Cristian Matthews MD in 1-2 weeks  Call or return to clinic if these symptoms worsen or fail to improve as anticipated.

## 2022-07-07 NOTE — PROGRESS NOTES
Jasson Joseph is a 63 y.o. female who was seen by synchronous (real-time) audio-video technology on 7/7/2022. Assessment & Plan:   Below is the assessment and plan developed based on review of pertinent history, physical exam (if applicable), labs, studies, and medications. Grief reaction /Anxiety  Patient referred for mental health counseling for grief reaction  Ativan 0.5 mg bid prn for situational anxiety related to grief #10 tablets   Recommend follow up with Francisco Javier Guerra MD in 1-2 weeks  Call or return to clinic  if these symptoms worsen or fail to improve as anticipated. I spent at least 23 minutes on this visit with this established patient. ([id number])  Subjective: Jasson Joseph was seen for No chief complaint on file. Patient is a 64 yo woman who unexpectedly lost her 28 yo son on 6/24/22 Patient reports that she experienced severe anxiety and shaking the day of his death and went to the ER where she  was prescribed Xanax 0.5 tid prn #15 tablets on 6/25/22 by Dr. Deana Trujillo. She has been taking the Xanax 1-2x daily and reports that she took the last pill last night. She states that in the past she also took Ativan for a short period of time. She feels that she needs the medication a little longer for episodes of anxiety. She states that she has been able to sleep at night when she takes the medication, and is eating well. She has two other daughters and her  and friends who have been very supportive. She also has her ted. She is interested in grief counseling and she will be provided with our list of providers. They are currently awaiting autopsy results. Prior to Admission medications    Medication Sig Start Date End Date Taking? Authorizing Provider   ALPRAZolam (Xanax) 0.5 mg tablet Take 1 Tablet by mouth every eight (8) hours as needed for Anxiety.  Max Daily Amount: 1.5 mg. 6/24/22   Néstor Angel MD   varicella-zoster recombinant, PF, [facility]) 50 mcg/0.5 mL susr injection 0.5 mL IM once now and then repeat in 2-6 months 5/16/22   Graham Reeves MD   pantoprazole (PROTONIX) 40 mg tablet Take 1 Tablet by mouth daily. 5/16/22   Graham Reeves MD   Allergy Relief, cetirizine, 10 mg tablet TAKE 1 TAB BY MOUTH DAILY. FOR ALLERGY 3/16/22   [place] SHOEMAKER NP   methotrexate 25 mg/mL chemo injection INJECT 0.6ML SUBCUTANEOUS 1 DAY A WEEK 3/2/22   Provider, Historical   BD Allergy Syringe 1 mL 28 gauge x 1/2\" syrg INJECT 0.6 ML UNDER THE SKIN ONCE A WEEK 3/3/22   Provider, Historical   SUCRALFATE PO Take 1 g by mouth three (3) times daily as needed. Provider, Historical   cyclobenzaprine (FLEXERIL) 10 mg tablet TAKE 1 TABLET BY MOUTH THREE TIMES A DAY AS NEEDED FOR MUSCLE SPASMS 12/16/20   Provider, Historical   diclofenac (VOLTAREN) 1 % gel APPLY 2G TO AFFECTED JOINT FOUR TIMES DAILY (MAX UP TO 8 G/ PER DAY) 4/23/20   Provider, Historical   OTHER Refresh eye drops    Provider, Historical   aspirin delayed-release 81 mg tablet Take  by mouth daily. Provider, Historical   olopatadine (PATADAY) 0.2 % drop ophthalmic solution Administer 1 Drop to both eyes daily. Provider, Historical   cholecalciferol (VITAMIN D3) (2,000 UNITS /50 MCG) cap capsule Take  by mouth two (2) times a day. Provider, Historical   mometasone (NASONEX) 50 mcg/actuation nasal spray 2 SPRAYS BY BOTH NOSTRILS ROUTE DAILY. FOR ALLERGY SYMPTOMS 9/14/19   Jennifer Brock MD   folic acid (FOLVITE) 1 mg tablet TAKE 1 TABLET BY MOUTH ONCE DAILY 3/26/17   Provider, Historical   ascorbic acid (VITAMIN C) 500 mg tablet Take  by mouth daily.     Provider, Historical       Patient Active Problem List   Diagnosis Code    Heart palpitations R00.2    Occipital neuralgia M54.81    Allergic rhinitis J30.9    Anxiety F41.9    Recurrent major depressive disorder (HCC) F33.9    Insomnia G47.00    GERD (gastroesophageal reflux disease) K21.9    Migraine without aura, without mention of intractable migraine without mention of status migrainosus G43.009    Family history of premature CAD Z80.55    Pure hypercholesterolemia E78.00    RA (rheumatoid arthritis) ([facility] [address] M06.9    Severe obesity (HCC) E66.01     Current Outpatient Medications   Medication Sig Dispense Refill    ALPRAZolam (Xanax) 0.5 mg tablet Take 1 Tablet by mouth every eight (8) hours as needed for Anxiety. Max Daily Amount: 1.5 mg. 15 Tablet 0    varicella-zoster recombinant, PF, (SHINGRIX) 50 mcg/0.5 mL susr injection 0.5 mL IM once now and then repeat in 2-6 months 0.5 mL 1    pantoprazole (PROTONIX) 40 mg tablet Take 1 Tablet by mouth daily.  Allergy Relief, cetirizine, 10 mg tablet TAKE 1 TAB BY MOUTH DAILY. FOR ALLERGY 90 Tablet 3    methotrexate 25 mg/mL chemo injection INJECT 0.6ML SUBCUTANEOUS 1 DAY A WEEK      BD Allergy Syringe 1 mL 28 gauge x 1/2\" syrg INJECT 0.6 ML UNDER THE SKIN ONCE A WEEK      SUCRALFATE PO Take 1 g by mouth three (3) times daily as needed.  cyclobenzaprine (FLEXERIL) 10 mg tablet TAKE 1 TABLET BY MOUTH THREE TIMES A DAY AS NEEDED FOR MUSCLE SPASMS      diclofenac (VOLTAREN) 1 % gel APPLY 2G TO AFFECTED JOINT FOUR TIMES DAILY (MAX UP TO 8 G/ PER DAY)      OTHER Refresh eye drops      aspirin delayed-release 81 mg tablet Take  by mouth daily.  olopatadine (PATADAY) 0.2 % drop ophthalmic solution Administer 1 Drop to both eyes daily.  cholecalciferol (VITAMIN D3) (2,000 UNITS /50 MCG) cap capsule Take  by mouth two (2) times a day.  mometasone (NASONEX) 50 mcg/actuation nasal spray 2 SPRAYS BY BOTH NOSTRILS ROUTE DAILY. FOR ALLERGY SYMPTOMS 3 Container 3    folic acid (FOLVITE) 1 mg tablet TAKE 1 TABLET BY MOUTH ONCE DAILY  0    ascorbic acid (VITAMIN C) 500 mg tablet Take  by mouth daily. Allergies   Allergen Reactions    Percocet [Oxycodone-Acetaminophen] Other (comments)     Slows heart rate.      Past Medical History:   Diagnosis Date    Acute meniscal tear of knee 08/15/2017    Dr. Leonardo Pickup    Acute torn meniscus of knee     Right knee    Allergic rhinitis 6/16/2014    Anxiety 7/14/2014    Arthritis     Bone spur     Left shoulder    Dense breast tissue on mammogram     Depression 7/14/2014    Family history of premature CAD 7/15/2015    GERD (gastroesophageal reflux disease) 9/10/2014    Headache     Hx of mammogram April 2014    WNL per pt.  Hyperlipidemia 7/28/2015    Insomnia 7/14/2014    Neuralgia     Occipital neuralgia    Occipital neuralgia 6/16/2014    RA (rheumatoid arthritis) ([facility] [address]     Screening for malignant neoplasm of the cervix Approx. 7 yrs ago    Negative per pt. Past Surgical History:   Procedure Laterality Date    COLONOSCOPY N/A 8/13/2020    diverticulosis - performed by Daniel Lainez [facility] at [facility] HX ENDOSCOPY  2019    esophageal dilation with 20mm sized balloon    HX ENDOSCOPY  02/25/2022    s/p dilation    HX HYSTERECTOMY      HX KNEE ARTHROSCOPY Right 04/16/2014    HX PELVIC LAPAROSCOPY  2001    Laproscopy of abdomen - removal of scar tissue    HX SHOULDER ARTHROSCOPY Left 06/2013    HX SHOULDER ARTHROSCOPY Left 03/2013    Shaved bone spur     HX TOTAL ABDOMINAL HYSTERECTOMY  1999       ROS - per HPI      Objective:   No flowsheet data found. General: alert, cooperative, no distress   Mental  status: normal mood, behavior, speech, dress, motor activity, and thought processes, able to follow commands   Eyes: EOM intact, normal sclera       Neck: no visualized mass   Resp: normal effort and no respiratory distress   Neuro: no gross deficits       Skin: no discoloration or lesions of concern on visible areas   Psychiatric: sad affect, no hallucinations     Graham Humphries, was evaluated through a synchronous (real-time) audio-video encounter. The patient (or guardian if applicable) is aware that this is a billable service.  Verbal consent to proceed has been obtained within the past 12 months. The visit was conducted pursuant to the emergency declaration under the [address] [place] [address] and the [employer] General Act. Patient identification was verified, and a caregiver was present when appropriate. The patient was located in a state where the provider was credentialed to provide care.      Burt Garcia MD

## 2022-08-18 ENCOUNTER — OFFICE VISIT (OUTPATIENT)
Dept: CARDIOLOGY CLINIC | Age: 62
End: 2022-08-18
Payer: MEDICARE

## 2022-08-18 VITALS
DIASTOLIC BLOOD PRESSURE: 88 MMHG | HEART RATE: 74 BPM | OXYGEN SATURATION: 97 % | BODY MASS INDEX: 34.66 KG/M2 | SYSTOLIC BLOOD PRESSURE: 138 MMHG | WEIGHT: 203 LBS | HEIGHT: 64 IN

## 2022-08-18 DIAGNOSIS — R00.2 PALPITATIONS: Primary | ICD-10-CM

## 2022-08-18 PROCEDURE — G8428 CUR MEDS NOT DOCUMENT: HCPCS | Performed by: INTERNAL MEDICINE

## 2022-08-18 PROCEDURE — G9717 DOC PT DX DEP/BP F/U NT REQ: HCPCS | Performed by: INTERNAL MEDICINE

## 2022-08-18 PROCEDURE — G8417 CALC BMI ABV UP PARAM F/U: HCPCS | Performed by: INTERNAL MEDICINE

## 2022-08-18 PROCEDURE — 99203 OFFICE O/P NEW LOW 30 MIN: CPT | Performed by: INTERNAL MEDICINE

## 2022-08-18 PROCEDURE — G0463 HOSPITAL OUTPT CLINIC VISIT: HCPCS | Performed by: INTERNAL MEDICINE

## 2022-08-18 PROCEDURE — 3017F COLORECTAL CA SCREEN DOC REV: CPT | Performed by: INTERNAL MEDICINE

## 2022-08-18 PROCEDURE — G9899 SCRN MAM PERF RSLTS DOC: HCPCS | Performed by: INTERNAL MEDICINE

## 2022-08-18 NOTE — PROGRESS NOTES
Cele Hong is a 63 y.o. female    Visit Vitals  /88 (BP 1 Location: Left upper arm, BP Patient Position: Sitting, BP Cuff Size: Adult)   Pulse 74   Ht 5' 4\" (1.626 m)   Wt 203 lb (92.1 kg)   LMP 10/31/1986 Comment: Partial hysterectomy   SpO2 97%   BMI 34.84 kg/m²       Chief Complaint   Patient presents with    Cholesterol Problem    Palpitations       Chest pain FLUTTERING  SOB NO  Dizziness NO  Swelling HANDS POSSIBLY FROM ARTHRITIS  Recent hospital visit NO  Refills NO  COVID VACCINE STATUS YES  HAD COVID?  YES    BURNING SENSATION IN LEGS

## 2022-08-18 NOTE — PROGRESS NOTES
Reason to see patient: Palpitations    Referring: Ching Figueroa MD    HPI: Charlene Solis is a 63 y.o. female with past medical history significant for rheumatoid arthritis, palpitations, PVCs, is here for evaluation. I had last seen her back in 2019. She was lost to follow-up. Most recently in June 2022 her son passed away and has been through a lot of stress. She has been having symptoms of palpitations. Symptoms are present on most days. Described as heart racing sensation. No symptoms of chest pain, lightheadedness or dizziness. She is also noticed burning sensation in her lower extremities in last couple months. Denies any symptoms of fever, chills, cough. EKG from June 24 2022 was personally reviewed. EKG demonstrates sinus tachycardia with mild LVH pattern with normal axis with normal intervals. Lab results including CBC, CMP from June 24, 2022 were personally reviewed. Plan:    1. Palpitations: Previously had Holter monitor in 2013 which demonstrated occasional supraventricular ectopies and rare PVCs with 12 couplets and 2 pauses of 2.0 seconds. Has not had a repeat Holter monitor since then. We will proceed with a 24-hour Holter monitor. Echo from June 2021 demonstrated normal EF and normal PA pressure. 2. CAC Zero in 5/17/2019: Continue preventive care. 3. Rheumatoid Arthritis- On Methotrexate. 4. See Dr. Celis in 1 month. Her daughter Allen Baez is my patient as well. ATTENTION:   This medical record was transcribed using an electronic medical records/speech recognition system. Although proofread, it may and can contain electronic, spelling and other errors. Corrections may be executed at a later time. Please feel free to contact us for any clarifications as needed.       Past Medical History:   Diagnosis Date    Acute meniscal tear of knee 08/15/2017    Dr. Gutiérrez July    Acute torn meniscus of knee     Right knee    Allergic rhinitis 6/16/2014    Anxiety 7/14/2014    Arthritis     Bone spur     Left shoulder    Dense breast tissue on mammogram     Depression 7/14/2014    Family history of premature CAD 7/15/2015    GERD (gastroesophageal reflux disease) 9/10/2014    Headache     Hx of mammogram April 2014    WNL per pt. Hyperlipidemia 7/28/2015    Insomnia 7/14/2014    Neuralgia     Occipital neuralgia    Occipital neuralgia 6/16/2014    RA (rheumatoid arthritis) (HCC)     Screening for malignant neoplasm of the cervix Approx. 7 yrs ago    Negative per pt.             Past Surgical History:   Procedure Laterality Date    COLONOSCOPY N/A 8/13/2020    diverticulosis - performed by Van Jean-Baptiste MD at [place] [address].    HX ENDOSCOPY  2019    esophageal dilation with 20mm sized balloon    HX ENDOSCOPY  02/25/2022    s/p dilation    HX HYSTERECTOMY      HX KNEE ARTHROSCOPY Right 04/16/2014    HX PELVIC LAPAROSCOPY  2001    Laproscopy of abdomen - removal of scar tissue    HX SHOULDER ARTHROSCOPY Left 06/2013    HX SHOULDER ARTHROSCOPY Left 03/2013    Shaved bone spur     HX TOTAL ABDOMINAL HYSTERECTOMY  1999             Family History   Problem Relation Age of Onset    Diabetes Mother     Cancer Mother         cervical    Heart Disease Father 80        MI    Prostate Cancer Father     Heart Attack Brother 62        MI    Breast Cancer Sister     Diabetes Sister     Breast Cancer Sister     Lung Cancer Sister     Diabetes Sister     Hypertension Sister     Throat cancer Sister     No Known Problems Sister     Leukemia Brother     Hypertension Brother     Other Brother         prediabetic    Lung Cancer Brother     Heart Failure Brother     Thyroid Disease Daughter     No Known Problems Daughter         unknown issues    No Known Problems Son     Heart Disease Paternal Grandmother     Heart Attack Sister         MI           Social History     Socioeconomic History    Marital status:      Spouse name: Not on file    Number of children: Not on file    Years of education: Not on file    Highest education level: Not on file   Occupational History    Not on file   Tobacco Use    Smoking status: Never    Smokeless tobacco: Never   Vaping Use    Vaping Use: Never used   Substance and Sexual Activity    Alcohol use: Not Currently     Alcohol/week: 5.0 standard drinks     Types: 5 Standard drinks or equivalent per week     Comment: Beer and wine    Drug use: No    Sexual activity: Yes     Partners: Male     Birth control/protection: Surgical   Other Topics Concern    Dental Braces Not Asked    Endoscopic Camera Pill Not Asked    Metallic Foreign Body Not Asked    Medication Patches Not Asked    Taking Feraheme Not Asked    Claustrophobic Not Asked    Removable Dental Work Not Asked    Hearing Aids Not Asked    Body Piercing Not Asked    Radiation Seeds Not Asked    Pregnant or Breast Feeding Not Asked    Wounded by Shrapnel or Bullet Not Asked    Other-See Comment Not Asked    Other Implant-See Comment Not Asked   Social History Narrative    Not on file     Social Determinants of Health     Financial Resource Strain: Not on file   Food Insecurity: Not on file   Transportation Needs: Not on file   Physical Activity: Not on file   Stress: Not on file   Social Connections: Not on file   Intimate Partner Violence: Not on file   Housing Stability: Not on file         Allergies   Allergen Reactions    Percocet [Oxycodone-Acetaminophen] Other (comments)     Slows heart rate. Current Outpatient Medications   Medication Sig Dispense Refill    methotrexate, PF, 25 mg/mL injection INJECT 0.6 ML UNDER THE SKIN 1 DAY A WEEK      pantoprazole (PROTONIX) 40 mg tablet Take 1 Tablet by mouth daily. Allergy Relief, cetirizine, 10 mg tablet TAKE 1 TAB BY MOUTH DAILY.  FOR ALLERGY 90 Tablet 3    methotrexate 25 mg/mL chemo injection INJECT 0.6ML SUBCUTANEOUS 1 DAY A WEEK      BD Allergy Syringe 1 mL 28 gauge x 1/2\" syrg INJECT 0.6 ML UNDER THE SKIN ONCE A WEEK SUCRALFATE PO Take 1 g by mouth three (3) times daily as needed. cyclobenzaprine (FLEXERIL) 10 mg tablet TAKE 1 TABLET BY MOUTH THREE TIMES A DAY AS NEEDED FOR MUSCLE SPASMS      diclofenac (VOLTAREN) 1 % gel APPLY 2G TO AFFECTED JOINT FOUR TIMES DAILY (MAX UP TO 8 G/ PER DAY)      OTHER Refresh eye drops      aspirin delayed-release 81 mg tablet Take  by mouth daily. olopatadine (PATADAY) 0.2 % drop ophthalmic solution Administer 1 Drop to both eyes daily. cholecalciferol (VITAMIN D3) (2,000 UNITS /50 MCG) cap capsule Take  by mouth two (2) times a day. mometasone (NASONEX) 50 mcg/actuation nasal spray 2 SPRAYS BY BOTH NOSTRILS ROUTE DAILY. FOR ALLERGY SYMPTOMS 3 Container 3    folic acid (FOLVITE) 1 mg tablet TAKE 1 TABLET BY MOUTH ONCE DAILY  0    ascorbic acid, vitamin C, (VITAMIN C) 500 mg tablet Take  by mouth daily. LORazepam (ATIVAN) 0.5 mg tablet Take 1 Tablet by mouth two (2) times daily as needed for Anxiety. Max Daily Amount: 1 mg. (Patient not taking: Reported on 8/18/2022) 10 Tablet 0    ALPRAZolam (Xanax) 0.5 mg tablet Take 1 Tablet by mouth every eight (8) hours as needed for Anxiety. Max Daily Amount: 1.5 mg. (Patient not taking: Reported on 8/18/2022) 15 Tablet 0        ROS:  12 point review of systems was performed.  All negative except for HPI     Physical Exam:  Visit Vitals  /88 (BP 1 Location: Left upper arm, BP Patient Position: Sitting, BP Cuff Size: Adult)   Pulse 74   Ht 5' 4\" (1.626 m)   Wt 203 lb (92.1 kg)   LMP 10/31/1986 Comment: Partial hysterectomy   SpO2 97%   BMI 34.84 kg/m²       Gen:  Well-developed, well-nourished, in no acute distress  HEENT:  Pink conjunctivae, PERRL, hearing intact to voice, moist mucous membranes  Neck:  Supple, without masses, thyroid non-tender  Resp:  No accessory muscle use, clear breath sounds without wheezes rales or rhonchi  Card:  No murmurs, normal S1, S2 without thrills, bruits or peripheral edema  Abd:  Soft, non-tender, non-distended, normoactive bowel sounds are present, no palpable organomegaly and no detectable hernias  Lymph:  No cervical or inguinal adenopathy  Musc:  No cyanosis or clubbing  Skin:  No rashes or ulcers, skin turgor is good  Neuro:  Cranial nerves are grossly intact, no focal motor weakness, follows commands appropriately  Psych:  Good insight, oriented to person, place and time, alert     Labs:     Lab Results   Component Value Date/Time    WBC 7.3 06/24/2022 08:19 AM    HGB 13.9 06/24/2022 08:19 AM    Hemoglobin (POC) 11.2 (L) 08/06/2015 11:09 AM    HCT 40.0 06/24/2022 08:19 AM    Hematocrit (POC) 33 (L) 08/06/2015 11:09 AM    PLATELET 771 03/60/9806 08:19 AM    MCV 81.5 06/24/2022 08:19 AM     Lab Results   Component Value Date/Time    Glucose 113 (H) 06/24/2022 08:19 AM    Glucose (POC) 114 (H) 08/06/2015 11:09 AM    Creatinine (POC) 0.9 08/06/2015 11:09 AM    Creatinine 1.12 (H) 06/24/2022 08:19 AM      Lab Results   Component Value Date/Time    Cholesterol, Total 157 01/27/2017 03:48 PM     Lab Results   Component Value Date/Time    ALT (SGPT) 32 06/24/2022 08:19 AM    Alk.  phosphatase 135 (H) 06/24/2022 08:19 AM    Bilirubin, direct 0.13 01/27/2017 03:48 PM    Bilirubin, total 0.3 06/24/2022 08:19 AM    Albumin 3.9 06/24/2022 08:19 AM    Protein, total 7.9 06/24/2022 08:19 AM    PLATELET 094 89/94/8749 08:19 AM     No results found for: INR, INREXT, PTMR, PTP, PT1, PT2, INREXT   Lab Results   Component Value Date/Time    GFR est non-AA 50 (L) 06/24/2022 08:19 AM    GFRNA, POC >60 08/06/2015 11:09 AM    GFR est AA >60 06/24/2022 08:19 AM    GFRAA, POC >60 08/06/2015 11:09 AM    Creatinine 1.12 (H) 06/24/2022 08:19 AM    Creatinine (POC) 0.9 08/06/2015 11:09 AM    BUN 12 06/24/2022 08:19 AM    BUN (POC) 6 (L) 08/06/2015 11:09 AM    Sodium 140 06/24/2022 08:19 AM    Sodium (POC) 141 08/06/2015 11:09 AM    Potassium 4.1 06/24/2022 08:19 AM    Potassium (POC) 3.9 08/06/2015 11:09 AM    Chloride 109 (H) 06/24/2022 08:19 AM    Chloride (POC) 107 08/06/2015 11:09 AM    CO2 21 06/24/2022 08:19 AM     No results found for: PSA, PSA2, PSAR1, Douglas Jazmyne, PSAR3, [id number], [id number]  Lab Results   Component Value Date/Time    TSH 1.280 01/17/2019 12:45 PM      Lab Results   Component Value Date/Time    Glucose 113 (H) 06/24/2022 08:19 AM    Glucose (POC) 114 (H) 08/06/2015 11:09 AM      No results found for: CPK, RCK1, RCK2, RCK3, RCK4, CKMB, CKNDX, CKND1, TROPT, TROIQ, BNPP, BNP   No results found for: BNP, BNPP, BNPPPOC, XBNPT, BNPNT   Lab Results   Component Value Date/Time    Sodium 140 06/24/2022 08:19 AM    Potassium 4.1 06/24/2022 08:19 AM    Chloride 109 (H) 06/24/2022 08:19 AM    CO2 21 06/24/2022 08:19 AM    Anion gap 10 06/24/2022 08:19 AM    Glucose 113 (H) 06/24/2022 08:19 AM    BUN 12 06/24/2022 08:19 AM    Creatinine 1.12 (H) 06/24/2022 08:19 AM    BUN/Creatinine ratio 11 (L) 06/24/2022 08:19 AM    GFR est AA >60 06/24/2022 08:19 AM    GFR est non-AA 50 (L) 06/24/2022 08:19 AM    Calcium 10.3 (H) 06/24/2022 08:19 AM      Lab Results   Component Value Date/Time    Sodium 140 06/24/2022 08:19 AM    Potassium 4.1 06/24/2022 08:19 AM    Chloride 109 (H) 06/24/2022 08:19 AM    CO2 21 06/24/2022 08:19 AM    Anion gap 10 06/24/2022 08:19 AM    Glucose 113 (H) 06/24/2022 08:19 AM    BUN 12 06/24/2022 08:19 AM    Creatinine 1.12 (H) 06/24/2022 08:19 AM    BUN/Creatinine ratio 11 (L) 06/24/2022 08:19 AM    GFR est AA >60 06/24/2022 08:19 AM    GFR est non-AA 50 (L) 06/24/2022 08:19 AM    Calcium 10.3 (H) 06/24/2022 08:19 AM    Bilirubin, total 0.3 06/24/2022 08:19 AM    ALT (SGPT) 32 06/24/2022 08:19 AM    Alk.  phosphatase 135 (H) 06/24/2022 08:19 AM    Protein, total 7.9 06/24/2022 08:19 AM    Albumin 3.9 06/24/2022 08:19 AM    Globulin 4.0 06/24/2022 08:19 AM    A-G Ratio 1.0 (L) 06/24/2022 08:19 AM      No results found for: HBA1C, BVS1CTJT, AZZ5MWRZ, VUE8KNJR      No results for input(s): CPK, CKMB, TROIQ in the last 72 hours. No lab exists for component: CKQMB, CPKMB        Problem List:     Problem List  Date Reviewed: 5/16/2022            Codes Class Noted    Severe obesity ([facility] ICD-10-CM: E66.01  ICD-9-CM: 278.01  5/8/2019        RA (rheumatoid arthritis) ([facility] ICD-10-CM: M06.9  ICD-9-CM: 714.0  Unknown        Pure hypercholesterolemia ICD-10-CM: E78.00  ICD-9-CM: 272.0  7/28/2015        Family history of premature CAD ICD-10-CM: Z82.49  ICD-9-CM: V17.3  7/15/2015        Migraine without aura, without mention of intractable migraine without mention of status migrainosus ICD-10-CM: G43.009  ICD-9-CM: 346.10  11/24/2014        GERD (gastroesophageal reflux disease) ICD-10-CM: K21.9  ICD-9-CM: 530.81  9/10/2014        Anxiety ICD-10-CM: F41.9  ICD-9-CM: 300.00  7/14/2014        Recurrent major depressive disorder ([facility] ICD-10-CM: F33.9  ICD-9-CM: 296.30  7/14/2014        Insomnia ICD-10-CM: G47.00  ICD-9-CM: 780.52  7/14/2014        Occipital neuralgia ICD-10-CM: M54.81  ICD-9-CM: 723.8  6/16/2014        Allergic rhinitis ICD-10-CM: J30.9  ICD-9-CM: 477.9  6/16/2014        Heart palpitations ICD-10-CM: R00.2  ICD-9-CM: 785.1  5/2/2014    Overview Signed 5/2/2014  5:49 PM by Candance Magnus, MD     Holter 6/6/13 [facility]:   1558 Supraventricular ectopies  271 PVC, 12 couplets. 2 pauses 2.0 sec                  Marcos Brown MD, [facility] [place]

## 2022-08-18 NOTE — PROGRESS NOTES
All orders entered per verbal orders of Dr. Cammy Dangelo.  MD Mehul  24 Hr holter monitor- Palpitations    Pt unable to stay d/t provider too far behind, she scheduled to come in and have it applied at a later date

## 2022-08-22 ENCOUNTER — CLINICAL SUPPORT (OUTPATIENT)
Dept: CARDIOLOGY CLINIC | Age: 62
End: 2022-08-22
Payer: MEDICARE

## 2022-08-22 DIAGNOSIS — R00.2 PALPITATION: Primary | ICD-10-CM

## 2022-08-22 PROCEDURE — 93227 XTRNL ECG REC<48 HR R&I: CPT | Performed by: INTERNAL MEDICINE

## 2022-08-22 PROCEDURE — 93225 XTRNL ECG REC<48 HRS REC: CPT | Performed by: INTERNAL MEDICINE

## 2022-08-22 NOTE — PROGRESS NOTES
Applied 24 hr holter per Dr Olivia Vale dx: palps. Pt has #[id number]   Chargeable visit.   [facility]

## 2022-08-30 ENCOUNTER — DOCUMENTATION ONLY (OUTPATIENT)
Dept: CARDIOLOGY CLINIC | Age: 62
End: 2022-08-30

## 2022-08-30 ENCOUNTER — TELEPHONE (OUTPATIENT)
Dept: CARDIOLOGY CLINIC | Age: 62
End: 2022-08-30

## 2022-08-30 RX ORDER — METOPROLOL SUCCINATE 25 MG/1
12.5 TABLET, EXTENDED RELEASE ORAL DAILY
Qty: 45 TABLET | Refills: 3 | Status: SHIPPED | OUTPATIENT
Start: 2022-08-30

## 2022-08-30 NOTE — TELEPHONE ENCOUNTER
Spoke with The pt, identified the pt with name and . Pt calling back for further information abut Holter results and new medication. I answered all her questions to the best of my ability. I will send her requested information via emere along with B/P log guidelines. The pt expressed understanding and agreement. Pt has no further questions or concerns at this time.

## 2022-08-30 NOTE — PROGRESS NOTES
[employer] 1 day holter results:    Preliminary Findings:  Patient monitored for 1d 19h starting on 08/22/2022 01:11 pm.  Primary rhythm was Sinus Rhythm.  Average heart rate was 85 bpm, Minimum heart rate was 62 bpm on Day 3 /  02:07:42 am, Max heart rate was 145 bpm on Day 1 / 05:26:27 pm  Atrial Fibrillation or Flutter: [place] was 0 %, longest event 0 ms on --, fastest rate -- bpm on --.  SVE(s): [place] was 0.1 %, max count per 24 hours 129  SVT (AT, RT): 4 events, longest event 8 beats on Day 3 / 04:30:26 am, fastest event 149 bpm on Day 1 / 09:36:25  pm  Pause: 0 events, longest pause 0 ms on --  AV Block: 0 %, most severe block demonstrated --  PVC(s): [place] was 0.5 %, max count per 24 hours 611, 2 disparate morphologies  Ventricular Tachycardia: 0 events, longest event 0 s at --, fastest rate -- bpm at --  Patient recorded 0 events during the monitoring period    results sent to scanning

## 2022-08-30 NOTE — PROGRESS NOTES
Pls notify>>>     Short occasional SVT episodes. We can start Toprol XL 25mg HALF tablet daily. Preliminary Findings:  Patient monitored for 1d 19h starting on 08/22/2022 01:11 pm.  Primary rhythm was Sinus Rhythm.  Average heart rate was 85 bpm, Minimum heart rate was 62 bpm on Day 3 /  02:07:42 am, Max heart rate was 145 bpm on Day 1 / 05:26:27 pm  Atrial Fibrillation or Flutter: [place] was 0 %, longest event 0 ms on --, fastest rate -- bpm on --.  SVE(s): [place] was 0.1 %, max count per 24 hours 129  SVT (AT, RT): 4 events, longest event 8 beats on Day 3 / 04:30:26 am, fastest event 149 bpm on Day 1 / 09:36:25  pm  Pause: 0 events, longest pause 0 ms on --  AV Block: 0 %, most severe block demonstrated --  PVC(s): [place] was 0.5 %, max count per 24 hours 611, 2 disparate morphologies  Ventricular Tachycardia: 0 events, longest event 0 s at --, fastest rate -- bpm at --  Patient recorded 0 events during the monitoring period

## 2022-08-30 NOTE — TELEPHONE ENCOUNTER
All orders entered per verbal orders of Dr. Mikael Peña. MD Mehul  Pls notify>>>      Short occasional SVT episodes. We can start Toprol XL 25mg HALF tablet daily. Refill per VO of Dr. Eliza Weiss:    Last appt: 8/22/2022    Future Appointments   Date Time Provider [place]   10/3/2022  3:40 PM Mikael Carver MD [facility]   10/27/2022 10:00 AM Luis F Hernandez, NP [facility]   5/16/2023  1:30 PM Galindo Ndiaye MD [facility]       Requested Prescriptions     Pending Prescriptions Disp Refills    metoprolol succinate (TOPROL-XL) 25 mg XL tablet 45 Tablet 3     Sig: Take 0.5 Tablets by mouth daily. Unable to reach pt.  Message left with detailed Holter and medication instructions

## 2022-09-23 ENCOUNTER — TRANSCRIBE ORDER (OUTPATIENT)
Dept: SCHEDULING | Age: 62
End: 2022-09-23

## 2022-09-23 DIAGNOSIS — Z12.31 ENCOUNTER FOR MAMMOGRAM TO ESTABLISH BASELINE MAMMOGRAM: Primary | ICD-10-CM

## 2022-10-03 ENCOUNTER — OFFICE VISIT (OUTPATIENT)
Dept: CARDIOLOGY CLINIC | Age: 62
End: 2022-10-03
Payer: MEDICARE

## 2022-10-03 VITALS
SYSTOLIC BLOOD PRESSURE: 135 MMHG | HEART RATE: 85 BPM | OXYGEN SATURATION: 98 % | WEIGHT: 205 LBS | BODY MASS INDEX: 35 KG/M2 | HEIGHT: 64 IN | DIASTOLIC BLOOD PRESSURE: 81 MMHG

## 2022-10-03 DIAGNOSIS — I47.1 SVT (SUPRAVENTRICULAR TACHYCARDIA) (HCC): Primary | ICD-10-CM

## 2022-10-03 DIAGNOSIS — R00.2 PALPITATION: ICD-10-CM

## 2022-10-03 PROCEDURE — 3017F COLORECTAL CA SCREEN DOC REV: CPT | Performed by: INTERNAL MEDICINE

## 2022-10-03 PROCEDURE — G9717 DOC PT DX DEP/BP F/U NT REQ: HCPCS | Performed by: INTERNAL MEDICINE

## 2022-10-03 PROCEDURE — G8428 CUR MEDS NOT DOCUMENT: HCPCS | Performed by: INTERNAL MEDICINE

## 2022-10-03 PROCEDURE — G0463 HOSPITAL OUTPT CLINIC VISIT: HCPCS | Performed by: INTERNAL MEDICINE

## 2022-10-03 PROCEDURE — 99214 OFFICE O/P EST MOD 30 MIN: CPT | Performed by: INTERNAL MEDICINE

## 2022-10-03 PROCEDURE — G8417 CALC BMI ABV UP PARAM F/U: HCPCS | Performed by: INTERNAL MEDICINE

## 2022-10-03 PROCEDURE — G9899 SCRN MAM PERF RSLTS DOC: HCPCS | Performed by: INTERNAL MEDICINE

## 2022-10-03 NOTE — PROGRESS NOTES
Chief Complaint   Patient presents with    Cholesterol Problem    Palpitations    Holter Monitor     FOLLOW UP     Visit Vitals  /81 (BP 1 Location: Left upper arm, BP Patient Position: Sitting)   Pulse 85   Ht 5' 4\" (1.626 m)   Wt 205 lb (93 kg)   SpO2 98%   BMI 35.19 kg/m²     Chest pain HAD A COUPLE LITTLE SHARP PAINS, MORE LIKE \"IN\" THE BREAST TISSUE    Palpations STILL HAVING ONE TO TWO DAYS A WEEK    SOB denied    Dizziness denied    Swelling in hands/feet denied     Recent hospital stays denied

## 2022-10-03 NOTE — PROGRESS NOTES
Office Follow-up    NAME: Kerry Francis   :  1960  MRM:  [id number]    Date:  10/3/2022         Assessment and Plan:     1. Palpitations: Holter 22: Short 4 events, longest event 8 beats fastest event 149 bpm. Previously had Holter monitor in  which demonstrated occasional supraventricular ectopies and rare PVCs with 12 couplets and 2 pauses of 2.0 seconds. - Started on Metoprolol 25mg po  HALF tablet daily. Feels less palpitaitons. - Her son passed away in 2022 and she has been having lot of stress. - Sleep disturbed. Asked her to take Melatonin. 2. CAC Zero in 2019: Continue preventive care. 3. Rheumatoid Arthritis- On Methotrexate. 4. See Dr. Gina Lazcano in 6 month. Her daughter Julissa Roldan is my patient as well. ATTENTION:   This medical record was transcribed using an electronic medical records/speech recognition system. Although proofread, it may and can contain electronic, spelling and other errors. Corrections may be executed at a later time. Please feel free to contact us for any clarifications as needed. Subjective: Kerry Francis, a 63y.o. year-old who presents for followup.    -----------------    HPI: Kerry Francis is a 63 y.o. female with past medical history significant for rheumatoid arthritis, palpitations, PVCs, is here for evaluation. I had last seen her back in 2019. She was lost to follow-up. Most recently in 2022 her son passed away and has been through a lot of stress. She has been having symptoms of palpitations. Symptoms are present on most days. Described as heart racing sensation. No symptoms of chest pain, lightheadedness or dizziness. She is also noticed burning sensation in her lower extremities in last couple months. Denies any symptoms of fever, chills, cough. EKG from 2022 was personally reviewed.   EKG demonstrates sinus tachycardia with mild LVH pattern with normal axis with normal intervals. Lab results including CBC, CMP from June 24, 2022 were personally reviewed. Exam:     Physical Exam:  Visit Vitals  /81 (BP 1 Location: Left upper arm, BP Patient Position: Sitting)   Pulse 85   Ht 5' 4\" (1.626 m)   Wt 205 lb (93 kg)   LMP 10/31/1986 Comment: Partial hysterectomy   SpO2 98%   BMI 35.19 kg/m²     General appearance - alert, well appearing, and in no distress  Mental status - affect appropriate to mood  Eyes - sclera anicteric, moist mucous membranes  Neck - supple, no significant adenopathy  Chest - clear to auscultation, no wheezes, rales or rhonchi  Heart - normal rate, regular rhythm, normal S1, S2, no murmurs, rubs, clicks or gallops  Abdomen - soft, nontender, nondistended, no masses or organomegaly  Extremities - peripheral pulses normal, no pedal edema  Skin - normal coloration  no rashes    Medications:     Current Outpatient Medications   Medication Sig    metoprolol succinate (TOPROL-XL) 25 mg XL tablet Take 0.5 Tablets by mouth daily. pantoprazole (PROTONIX) 40 mg tablet Take 1 Tablet by mouth daily. Allergy Relief, cetirizine, 10 mg tablet TAKE 1 TAB BY MOUTH DAILY. FOR ALLERGY    methotrexate 25 mg/mL chemo injection INJECT 0.6ML SUBCUTANEOUS 1 DAY A WEEK    BD Allergy Syringe 1 mL 28 gauge x 1/2\" syrg INJECT 0.6 ML UNDER THE SKIN ONCE A WEEK    SUCRALFATE PO Take 1 g by mouth three (3) times daily as needed. cyclobenzaprine (FLEXERIL) 10 mg tablet TAKE 1 TABLET BY MOUTH THREE TIMES A DAY AS NEEDED FOR MUSCLE SPASMS    diclofenac (VOLTAREN) 1 % gel APPLY 2G TO AFFECTED JOINT FOUR TIMES DAILY (MAX UP TO 8 G/ PER DAY)    OTHER Refresh eye drops    aspirin delayed-release 81 mg tablet Take  by mouth daily. olopatadine (PATADAY) 0.2 % drop ophthalmic solution Administer 1 Drop to both eyes daily. cholecalciferol (VITAMIN D3) (2,000 UNITS /50 MCG) cap capsule Take  by mouth two (2) times a day.     mometasone (NASONEX) 50 mcg/actuation nasal spray 2 SPRAYS BY BOTH NOSTRILS ROUTE DAILY. FOR ALLERGY SYMPTOMS    folic acid (FOLVITE) 1 mg tablet TAKE 1 TABLET BY MOUTH ONCE DAILY    ascorbic acid, vitamin C, (VITAMIN C) 500 mg tablet Take  by mouth daily. LORazepam (ATIVAN) 0.5 mg tablet Take 1 Tablet by mouth two (2) times daily as needed for Anxiety. Max Daily Amount: 1 mg. (Patient not taking: No sig reported)    ALPRAZolam (Xanax) 0.5 mg tablet Take 1 Tablet by mouth every eight (8) hours as needed for Anxiety. Max Daily Amount: 1.5 mg. (Patient not taking: No sig reported)     No current facility-administered medications for this visit. Diagnostic Data Review:       5/15/18-5/21/18: LOOP- Sinus w/ PACs    12/8/2017: ([place] C[facility]) stress EKG and nuclear normal without ischemia with LVEF 52%. 12/6/17: ECHO- EF 50-55%; Mild TR; Trace IN    6/4/14: ECHO- EF 55%, mild TR      Lab Review:     Lab Results   Component Value Date/Time    Cholesterol, Total 157 01/27/2017 03:48 PM     Lab Results   Component Value Date/Time    Creatinine (POC) 0.9 08/06/2015 11:09 AM    Creatinine 1.12 (H) 06/24/2022 08:19 AM     Lab Results   Component Value Date/Time    BUN 12 06/24/2022 08:19 AM    BUN (POC) 6 (L) 08/06/2015 11:09 AM     Lab Results   Component Value Date/Time    Potassium 4.1 06/24/2022 08:19 AM     No results found for: HBA1C, [id number]  Lab Results   Component Value Date/Time    Hemoglobin (POC) 11.2 (L) 08/06/2015 11:09 AM    HGB 13.9 06/24/2022 08:19 AM     Lab Results   Component Value Date/Time    PLATELET 473 18/22/1642 08:19 AM     No results for input(s): CPK, CKMB, TROIQ in the last 72 hours. No lab exists for component: CKQMB, CPKMB             ___________________________________________________    Brain Moll.  Aleck Jerod, MD, [facility] [place]

## 2022-10-19 ENCOUNTER — HOSPITAL ENCOUNTER (OUTPATIENT)
Dept: MAMMOGRAPHY | Age: 62
Discharge: HOME | End: 2022-10-19
Attending: INTERNAL MEDICINE
Payer: MEDICARE

## 2022-10-19 DIAGNOSIS — Z12.31 ENCOUNTER FOR MAMMOGRAM TO ESTABLISH BASELINE MAMMOGRAM: ICD-10-CM

## 2022-10-19 PROCEDURE — 77063 BREAST TOMOSYNTHESIS BI: CPT

## 2022-10-27 ENCOUNTER — OFFICE VISIT (OUTPATIENT)
Dept: NEUROLOGY | Age: 62
End: 2022-10-27
Payer: MEDICARE

## 2022-10-27 VITALS
DIASTOLIC BLOOD PRESSURE: 80 MMHG | OXYGEN SATURATION: 98 % | RESPIRATION RATE: 20 BRPM | BODY MASS INDEX: 34.66 KG/M2 | HEART RATE: 88 BPM | WEIGHT: 203 LBS | HEIGHT: 64 IN | SYSTOLIC BLOOD PRESSURE: 120 MMHG

## 2022-10-27 DIAGNOSIS — R42 DIZZINESS: ICD-10-CM

## 2022-10-27 DIAGNOSIS — R51.9 NEW ONSET OF HEADACHES AFTER AGE 50: ICD-10-CM

## 2022-10-27 DIAGNOSIS — R20.2 PARESTHESIA OF BOTH FEET: Primary | ICD-10-CM

## 2022-10-27 DIAGNOSIS — R20.2 ARM PARESTHESIA, LEFT: ICD-10-CM

## 2022-10-27 DIAGNOSIS — G44.321 INTRACTABLE CHRONIC POST-TRAUMATIC HEADACHE: ICD-10-CM

## 2022-10-27 PROCEDURE — G8427 DOCREV CUR MEDS BY ELIG CLIN: HCPCS | Performed by: NURSE PRACTITIONER

## 2022-10-27 PROCEDURE — 99215 OFFICE O/P EST HI 40 MIN: CPT | Performed by: NURSE PRACTITIONER

## 2022-10-27 PROCEDURE — 3017F COLORECTAL CA SCREEN DOC REV: CPT | Performed by: NURSE PRACTITIONER

## 2022-10-27 PROCEDURE — G8417 CALC BMI ABV UP PARAM F/U: HCPCS | Performed by: NURSE PRACTITIONER

## 2022-10-27 PROCEDURE — G9899 SCRN MAM PERF RSLTS DOC: HCPCS | Performed by: NURSE PRACTITIONER

## 2022-10-27 PROCEDURE — G9717 DOC PT DX DEP/BP F/U NT REQ: HCPCS | Performed by: NURSE PRACTITIONER

## 2022-10-27 RX ORDER — DIAZEPAM 10 MG/1
TABLET ORAL
Qty: 1 TABLET | Refills: 0 | Status: SHIPPED | OUTPATIENT
Start: 2022-10-27

## 2022-10-27 RX ORDER — GABAPENTIN 300 MG/1
300 CAPSULE ORAL 2 TIMES DAILY
Qty: 60 CAPSULE | Refills: 5 | Status: SHIPPED | OUTPATIENT
Start: 2022-10-27

## 2022-10-27 NOTE — PROGRESS NOTES
Chief Complaint   Patient presents with    Tingling       Visit Vitals  Ht 5' 4\" (1.626 m)   Wt 203 lb (92.1 kg)   LMP 10/31/1986 Comment: Partial hysterectomy   BMI 34.84 kg/m²

## 2022-10-28 NOTE — PROGRESS NOTES
Michell Trujillo is a 63 y.o. female who presents with the following  Chief Complaint   Patient presents with    Tingling       HPI      Following up for tingling in the legs and new onset of headache, dizziness  She states earlier this year she has developed worsening paresthesia in both legs  She is never been tested for neuropathy  It gets worse around the ankles to the toes  She does have symptoms up-and-down but this is where it stays usually  Nothing helps the symptoms  When she does more it gets worse  She feels pins-and-needles and stabbing pains  She does feel like she is unsteady when she is moving  She also has left arm paresthesias she states that she feels like it comes from the elbow as 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 and from her elbow to her fingertips will go numb a lot and have trouble coming back  No falls that have caused injury  No neck pain or head trauma    She does have a new onset of headaches  She did have occipital neuralgia couple years ago but this is different  This is right on the top left side of her head she had blinds fall on her head over the summer and it has been very pinpoint pain to the top left side of her head  She has some visual changes with this and does feel dizzy  The headache is chronic, daily  She has not had any, scans to evaluate this  She does have a family history significant of different types of cancer in the brain and she is also worried about something like this          Allergies   Allergen Reactions    Percocet [Oxycodone-Acetaminophen] Other (comments)     Slows heart rate. Current Outpatient Medications   Medication Sig    diazePAM (Valium) 10 mg tablet Take 1 tablet 30-45 minutes before MRI. Must have  drive home after. gabapentin (NEURONTIN) 300 mg capsule Take 1 Capsule by mouth two (2) times a day. Max Daily Amount: 600 mg.    metoprolol succinate (TOPROL-XL) 25 mg XL tablet Take 0.5 Tablets by mouth daily.     pantoprazole (PROTONIX) 40 mg tablet Take 1 Tablet by mouth daily. Allergy Relief, cetirizine, 10 mg tablet TAKE 1 TAB BY MOUTH DAILY. FOR ALLERGY    methotrexate 25 mg/mL chemo injection INJECT 0.6ML SUBCUTANEOUS 1 DAY A WEEK    BD Allergy Syringe 1 mL 28 gauge x 1/2\" syrg INJECT 0.6 ML UNDER THE SKIN ONCE A WEEK    cyclobenzaprine (FLEXERIL) 10 mg tablet TAKE 1 TABLET BY MOUTH THREE TIMES A DAY AS NEEDED FOR MUSCLE SPASMS    diclofenac (VOLTAREN) 1 % gel APPLY 2G TO AFFECTED JOINT FOUR TIMES DAILY (MAX UP TO 8 G/ PER DAY)    OTHER Refresh eye drops    aspirin delayed-release 81 mg tablet Take  by mouth daily. olopatadine (PATADAY) 0.2 % drop ophthalmic solution Administer 1 Drop to both eyes daily. cholecalciferol (VITAMIN D3) (2,000 UNITS /50 MCG) cap capsule Take  by mouth two (2) times a day. mometasone (NASONEX) 50 mcg/actuation nasal spray 2 SPRAYS BY BOTH NOSTRILS ROUTE DAILY. FOR ALLERGY SYMPTOMS    folic acid (FOLVITE) 1 mg tablet TAKE 1 TABLET BY MOUTH ONCE DAILY    ascorbic acid, vitamin C, (VITAMIN C) 500 mg tablet Take  by mouth daily. SUCRALFATE PO Take 1 g by mouth three (3) times daily as needed. (Patient not taking: Reported on 10/27/2022)     No current facility-administered medications for this visit. Social History     Tobacco Use   Smoking Status Never   Smokeless Tobacco Never       Past Medical History:   Diagnosis Date    Acute meniscal tear of knee 08/15/2017    Dr. Dona Lindo    Acute torn meniscus of knee     Right knee    Allergic rhinitis 6/16/2014    Anxiety 7/14/2014    Arthritis     Bone spur     Left shoulder    Dense breast tissue on mammogram     Depression 7/14/2014    Family history of premature CAD 7/15/2015    GERD (gastroesophageal reflux disease) 9/10/2014    Headache     Hx of mammogram April 2014    WNL per pt.     Hyperlipidemia 7/28/2015    Insomnia 7/14/2014    Neuralgia     Occipital neuralgia    Occipital neuralgia 6/16/2014    RA (rheumatoid arthritis) ([facility] [address]     Screening for malignant neoplasm of the cervix Approx. 7 yrs ago    Negative per pt. Past Surgical History:   Procedure Laterality Date    COLONOSCOPY N/A 8/13/2020    diverticulosis - performed by Christiano Catherine MD at [facility]    HX ENDOSCOPY  2019    esophageal dilation with 20mm sized balloon    HX ENDOSCOPY  02/25/2022    s/p dilation    HX HYSTERECTOMY      HX KNEE ARTHROSCOPY Right 04/16/2014    HX PELVIC LAPAROSCOPY  2001    Laproscopy of abdomen - removal of scar tissue    HX SHOULDER ARTHROSCOPY Left 06/2013    HX SHOULDER ARTHROSCOPY Left 03/2013    Shaved bone spur     HX TOTAL ABDOMINAL HYSTERECTOMY  1999       Family History   Problem Relation Age of Onset    Diabetes Mother     Cancer Mother         cervical    Heart Disease Father 80        MI    Prostate Cancer Father     Heart Attack Brother 62        MI    Breast Cancer Sister     Diabetes Sister     Breast Cancer Sister     Lung Cancer Sister     Diabetes Sister     Hypertension Sister     Throat cancer Sister     No Known Problems Sister     Leukemia Brother     Hypertension Brother     Other Brother         prediabetic    Lung Cancer Brother     Heart Failure Brother     Thyroid Disease Daughter     No Known Problems Daughter         unknown issues    No Known Problems Son     Heart Disease Paternal Grandmother     Heart Attack Sister         MI       Social History     Socioeconomic History    Marital status:    Tobacco Use    Smoking status: Never    Smokeless tobacco: Never   Vaping Use    Vaping Use: Never used   Substance and Sexual Activity    Alcohol use: Not Currently     Alcohol/week: 5.0 standard drinks     Types: 5 Standard drinks or equivalent per week     Comment: Beer and wine    Drug use: No    Sexual activity: Yes     Partners: Male     Birth control/protection: Surgical       Review of Systems   Eyes:  Negative for blurred vision, double vision and photophobia.    Respiratory:  Negative for shortness of breath and wheezing. Cardiovascular:  Negative for chest pain and palpitations. Gastrointestinal:  Negative for nausea and vomiting. Musculoskeletal:  Positive for falls. Neurological:  Positive for dizziness, tingling, sensory change, weakness and headaches. Psychiatric/Behavioral:  Negative for memory loss. The patient is not nervous/anxious and does not have insomnia. Remainder of comprehensive review is negative. Physical Exam :    Visit Vitals  /80   Pulse 88   Resp 20   Ht 5' 4\" (1.626 m)   Wt 92.1 kg (203 lb)   LMP 10/31/1986 Comment: Partial hysterectomy   SpO2 98%   BMI 34.84 kg/m²       General: Well defined, nourished, and groomed individual in no acute distress. Neck: Supple, nontender, no bruits, no pain with resistance to active range of motion. Musculoskeletal: Extremities revealed no edema and had full range of motion of joints. Psych: Good mood and bright affect    NEUROLOGICAL EXAMINATION:    Mental Status: Alert and oriented to person, place, and time    Cranial Nerves:    II, III, IV, VI: Visual acuity grossly intact. Visual fields are normal.    Pupils are equal, round, and reactive to light and accommodation. Extra-ocular movements are full and fluid. Fundoscopic exam was benign, no ptosis or nystagmus. V-XII: Hearing is grossly intact. Facial features are symmetric, with normal sensation and strength. The palate rises symmetrically and the tongue protrudes midline. Sternocleidomastoids 5/5. Motor Examination: Normal tone, bulk, and strength, 5/5 muscle strength throughout. Coordination: Finger to nose was normal. No resting or intention tremor    Gait and Station: Steady while walking. Normal arm swing. No pronator drift. No muscle wasting or fasiculations noted. Reflexes: DTRs 2+ throughout. Neurosensory Exam:  Stocking glove sensory loss to temperature, vibration, and pinprick to the thighs.         Results for orders placed or performed during the hospital encounter of 06/24/22   CBC WITH AUTOMATED DIFF   Result Value Ref Range    WBC 7.3 3.6 - 11.0 K/uL    RBC 4.91 3.80 - 5.20 M/uL    HGB 13.9 11.5 - 16.0 g/dL    HCT 40.0 35.0 - 47.0 %    MCV 81.5 80.0 - 99.0 FL    MCH 28.3 26.0 - 34.0 PG    MCHC 34.8 30.0 - 36.5 g/dL    RDW 14.8 (H) 11.5 - 14.5 %    PLATELET 822 302 - 409 K/uL    MPV 9.5 8.9 - 12.9 FL    NRBC 0.0 0  WBC    ABSOLUTE NRBC 0.00 0.00 - 0.01 K/uL    NEUTROPHILS 63 32 - 75 %    LYMPHOCYTES 30 12 - 49 %    MONOCYTES 6 5 - 13 %    EOSINOPHILS 1 0 - 7 %    BASOPHILS 0 0 - 1 %    IMMATURE GRANULOCYTES 0 0.0 - 0.5 %    ABS. NEUTROPHILS 4.5 1.8 - 8.0 K/UL    ABS. LYMPHOCYTES 2.2 0.8 - 3.5 K/UL    ABS. MONOCYTES 0.5 0.0 - 1.0 K/UL    ABS. EOSINOPHILS 0.1 0.0 - 0.4 K/UL    ABS. BASOPHILS 0.0 0.0 - 0.1 K/UL    ABS. IMM. GRANS. 0.0 0.00 - 0.04 K/UL    DF AUTOMATED     METABOLIC PANEL, COMPREHENSIVE   Result Value Ref Range    Sodium 140 136 - 145 mmol/L    Potassium 4.1 3.5 - 5.1 mmol/L    Chloride 109 (H) 97 - 108 mmol/L    CO2 21 21 - 32 mmol/L    Anion gap 10 5 - 15 mmol/L    Glucose 113 (H) 65 - 100 mg/dL    BUN 12 6 - 20 MG/DL    Creatinine 1.12 (H) 0.55 - 1.02 MG/DL    BUN/Creatinine ratio 11 (L) 12 - 20      GFR est AA >60 >60 ml/min/1.73m2    GFR est non-AA 50 (L) >60 ml/min/1.73m2    Calcium 10.3 (H) 8.5 - 10.1 MG/DL    Bilirubin, total 0.3 0.2 - 1.0 MG/DL    ALT (SGPT) 32 12 - 78 U/L    AST (SGOT) 22 15 - 37 U/L    Alk. phosphatase 135 (H) 45 - 117 U/L    Protein, total 7.9 6.4 - 8.2 g/dL    Albumin 3.9 3.5 - 5.0 g/dL    Globulin 4.0 2.0 - 4.0 g/dL    A-G Ratio 1.0 (L) 1.1 - 2.2     TROPONIN-HIGH SENSITIVITY   Result Value Ref Range    Troponin-High Sensitivity 9 0 - 51 ng/L   SAMPLES BEING HELD   Result Value Ref Range    SAMPLES BEING HELD 1RED 1BLU     COMMENT        Add-on orders for these samples will be processed based on acceptable specimen integrity and analyte stability, which may vary by analyte.    EKG, 12 LEAD, INITIAL   Result Value Ref Range    Ventricular Rate 105 BPM    Atrial Rate 105 BPM    P-R Interval 162 ms    QRS Duration 76 ms    Q-T Interval 344 ms    QTC Calculation (Bezet) 454 ms    Calculated P Axis 44 degrees    Calculated R Axis -18 degrees    Calculated T Axis 4 degrees    Diagnosis       Sinus tachycardia  Moderate voltage criteria for LVH, may be normal variant ( R in aVL , Eder   product )  Septal infarct , age undetermined  When compared with ECG of 06-AUG-2015 10:56,  premature ventricular complexes are no longer present  Septal infarct is now present  Nonspecific T wave abnormality now evident in Lateral leads  Confirmed by Valencia Seaman ([id number]) on 6/25/2022 8:47:37 AM         Orders Placed This Encounter    MRI BRAIN W WO CONT     Standing Status:   Future     Standing Expiration Date:   11/27/2023     Order Specific Question:   STAT Creatinine as indicated     Answer:   Yes    EMG LIMITED     Standing Status:   Future     Standing Expiration Date:   4/27/2023     Order Specific Question:   Reason for Exam:     Answer:   both legs, left arm    diazePAM (Valium) 10 mg tablet     Sig: Take 1 tablet 30-45 minutes before MRI. Must have  drive home after. Dispense:  1 Tablet     Refill:  0    gabapentin (NEURONTIN) 300 mg capsule     Sig: Take 1 Capsule by mouth two (2) times a day. Max Daily Amount: 600 mg. Dispense:  60 Capsule     Refill:  5       1. Paresthesia of both feet    2. Arm paresthesia, left    3. New onset of headaches after age 53    2. Intractable chronic post-traumatic headache    5.  Dizziness        MRI of the brain to rule out stroke, tumor, lesion, mass  EMG of both legs and the left arm to look at neuropathy in the legs along with radiculopathy in the left arm pacifically at the elbow  Will give Valium for the MRI    Gabapentin 300 mg twice daily for symptom management for now as she is pretty debilitated  Follow-up after all testing              This note will not be viewable in [address]

## 2022-11-05 ENCOUNTER — HOSPITAL ENCOUNTER (OUTPATIENT)
Dept: MRI IMAGING | Age: 62
Discharge: HOME | End: 2022-11-05
Attending: NURSE PRACTITIONER
Payer: MEDICARE

## 2022-11-05 VITALS — BODY MASS INDEX: 34.84 KG/M2 | WEIGHT: 203 LBS

## 2022-11-05 DIAGNOSIS — R42 DIZZINESS: ICD-10-CM

## 2022-11-05 DIAGNOSIS — G44.321 INTRACTABLE CHRONIC POST-TRAUMATIC HEADACHE: ICD-10-CM

## 2022-11-05 DIAGNOSIS — R20.2 ARM PARESTHESIA, LEFT: ICD-10-CM

## 2022-11-05 DIAGNOSIS — R51.9 NEW ONSET OF HEADACHES AFTER AGE 50: ICD-10-CM

## 2022-11-05 PROCEDURE — 74011250636 HC RX REV CODE- 250/636: Performed by: RADIOLOGY

## 2022-11-05 PROCEDURE — 70553 MRI BRAIN STEM W/O & W/DYE: CPT

## 2022-11-05 PROCEDURE — A9576 INJ PROHANCE MULTIPACK: HCPCS | Performed by: RADIOLOGY

## 2022-11-05 RX ADMIN — GADOTERIDOL 18 ML: 279.3 INJECTION, SOLUTION INTRAVENOUS at 11:27

## 2022-11-16 ENCOUNTER — OFFICE VISIT (OUTPATIENT)
Dept: NEUROLOGY | Age: 62
End: 2022-11-16
Payer: MEDICARE

## 2022-11-16 DIAGNOSIS — G56.02 CARPAL TUNNEL SYNDROME OF LEFT WRIST: Primary | ICD-10-CM

## 2022-11-16 DIAGNOSIS — R20.2 PARESTHESIA OF BOTH FEET: ICD-10-CM

## 2022-11-16 PROCEDURE — 95886 MUSC TEST DONE W/N TEST COMP: CPT | Performed by: PSYCHIATRY & NEUROLOGY

## 2022-11-16 PROCEDURE — 95913 NRV CNDJ TEST 13/> STUDIES: CPT | Performed by: PSYCHIATRY & NEUROLOGY

## 2022-11-17 NOTE — PROCEDURES
EMG/ NCS Report  [facility]  - [facility]  [facility]  [facility], [address] [place], [address]   Ph: [phone]   FAX: [phone]/ [phone]    Test Date:  2022    Patient: Everardo Stovall : 1960 Physician: Susannah Go MD   ID#: [id number] SEX: Female Ref. Phys: Joe Moise NP   Tech: Kevin Terrell    Patient History / Exam:  Patient complaining of bilateral ankle to feet tingling and left arm paresthesia from elbow to her fingertips. Assess for neuropathy vs radiculopathy. EMG & NCV Findings:  Sensory and motor nerve conduction studies (as indicated in the tables) were within reference of normal except for slight prolongation left median sensory peak latency. All F Wave latencies were within normal limits. All F Wave left vs. right side latency differences were within normal limits. All H Reflex left vs. right side latency differences were within normal limits. Disposable concentric needle EMG (as indicated in the table) showed no evidence of electrical instability. Impressions: This study is mildly abnormal.  There is electrodiagnostic evidence of.early left median sensory neuropathy at the wrist as seen in carpal tunnel syndrome. There is no electrodiagnostic evidence of a generalized neuropathy, myopathy or significant lumbar radiculopathy at this time. Thank you for the consult.      Susannah Go MD    Nerve Conduction Studies  Anti Sensory Summary Table     Stim Site NR Peak (ms) Norm Peak (ms) P-T Amp (µV) Norm P-T Amp Site1 Site2 Dist (cm)   Left Median Anti Sensory (2nd Digit)  31.4 °C   Wrist    4.1 <4 58.1 >13 Wrist 2nd Digit 14.0   Elbow    4.1  60.7  Elbow Wrist 0.0   Left Radial Anti Sensory (Base 1st Digit)  32.1 °C   Wrist    2.0 <2.8 62.4 >11 Wrist Base 1st Digit 10.0   Left Sup Fibular Anti Sensory (Lat ankle)  23.5 °C   Lower leg    3.4 <4.6 9.7 >4 Lower leg Lat ankle 10.0   Site 2    2.9  9.6 Right Sup Fibular Anti Sensory (Lat ankle)  31.2 °C   Lower leg    2.3 <4.6 7.3 >4 Lower leg Lat ankle 10.0   Site 2    2.3  5.5       Left Sural Anti Sensory (Lat Mall)  30 °C   Calf    3.3 <4.5 14.1 >4.0 Calf Lat Mall 14.0   Site 2    3.1  9.5       Site 3    3.3  10.3       Right Sural Anti Sensory (Lat Mall)  30.3 °C   Calf    3.6 <4.5 18.3 >4.0 Calf Lat Mall 14.0   Site 2    3.6  14.1       Left Ulnar Anti Sensory (5th Digit)  32.4 °C   Wrist    3.5 <4.0 54.9 >9 Wrist 5th Digit 14.0   B Elbow    3.4  64.2  B Elbow Wrist 0.0     Motor Summary Table     Stim Site NR Onset (ms) Norm Onset (ms) O-P Amp (mV) Norm O-P Amp Amp (Prev) (%) Site1 Site2 Dist (cm) Jimmie (m/s) Norm Jimmie (m/s)   Left Fibular Motor (Ext Dig Brev)  31.1 °C   Ankle    4.0 <6.5 5.1 >1.1 100.0 Ankle Ext Dig Brev 8.0     B Fib    9.4  5.2  102.0 B Fib Ankle 31.0 57 >38   Poplt    10.8  5.0  96.2 Poplt B Fib 10.0 71 >42   Right Fibular Motor (Ext Dig Brev)  31.5 °C   Ankle    3.3 <6.5 6.2 >1.1 100.0 Ankle Ext Dig Brev 8.0     B Fib    9.0  6.7  108.1 B Fib Ankle 31.0 54 >38   Poplt    10.2  6.8  101.5 Poplt B Fib 10.0 83 >42   Left Median Motor (Abd Poll Brev)  32.2 °C   Wrist    4.1 <4.5 8.9 >4.1 100.0 Wrist Abd Poll Brev 8.0     Elbow    7.4  8.3  93.3 Elbow Wrist 19.0 58 >49   Left Tibial Motor (Abd Tavares Brev)  31.1 °C   Ankle    3.8 <6.1 10.8 >1.1 100.0 Ankle Abd Tavares Brev 8.0     Knee    11.3  8.1  75.0 Knee Ankle 34.0 45 >39   Right Tibial Motor (Abd Tavares Brev)  31.4 °C   Ankle    4.5 <6.1 12.4 >1.1 100.0 Ankle Abd Tavares Brev 8.0     Knee    10.8  7.4  59.7 Knee Ankle 0.0  >39   Left Ulnar Motor (Abd Dig Minimi)  32.2 °C   Wrist    2.9 <3.1 11.5 >7.0 100.0 Wrist Abd Dig Minimi 8.0     B Elbow    7.1  9.6  83.5 B Elbow Wrist 22.0 52 >50   A Elbow    9.1  9.9  103.1 A Elbow B Elbow 10.0 50 >50     F Wave Studies     NR F-Lat (ms) Lat Norm (ms) L-R F-Lat (ms) L-R Lat Norm   Left Tibial (Mrkrs) (Abd Hallucis)  31.7 °C      49.59 <56 3.63 <5.7 Right Tibial (Mrkrs) (Abd Hallucis)  31.3 °C      45.96 <56 3.63 <5.7   Left Ulnar (Mrkrs) (Abd Dig Min)  32 °C      27.02 <32  <2.5     H Reflex Studies     NR H-Lat (ms) L-R H-Lat (ms) L-R Lat Norm   Left Tibial (Gastroc)  31.1 °C      30.29 1.32 <2.0   Right Tibial (Gastroc)  31.3 °C      31.61 1.32 <2.0       EMG     Side Muscle Nerve Root Ins Act Fibs Psw Recrt Duration Amp Poly Comment   Left VastusLat Femoral L2-4 Nml Nml Nml Nml Nml Nml Nml    Left MedGastroc Tibial S1-2 Nml Nml Nml Nml Nml Nml Nml    Left AntTibialis Dp Br Peron L4-5 Nml Nml Nml Nml Nml Nml Nml    Left Peroneus Long   Nml Nml Nml Nml Nml Nml Nml    Left TensorFascLat SupGluteal L4-5, S1 Nml Nml Nml Nml Nml Nml Nml    Left Lower Lumb Parasp Rami L5,S1 Nml Nml Nml Nml Nml Nml Nml    Left Mid Lumb Parasp Rami L4,5 Nml Nml Nml Nml Nml Nml Nml    Right VastusLat Femoral L2-4 Nml Nml Nml Nml Nml Nml Nml    Right MedGastroc Tibial S1-2 Nml Nml Nml Nml Nml Nml Nml    Right AntTibialis Dp Br Peron L4-5 Nml Nml Nml Nml Nml Nml Nml    Right Peroneus Long   Nml Nml Nml Nml Nml Nml Nml    Right TensorFascLat SupGluteal L4-5, S1 Nml Nml Nml Nml Nml Nml Nml    Right Lower Lumb Parasp Rami L5,S1 Nml Nml Nml Nml Nml Nml Nml    Right Mid Lumb Parasp Rami L4,5 Nml Nml Nml Nml Nml Nml Nml    Left Deltoid Axillary C5-6 Nml Nml Nml Nml Nml Nml Nml    Left Triceps Radial C6-7-8 Nml Nml Nml Nml Nml Nml Nml    Left Biceps Musculocut C5-6 Nml Nml Nml Nml Nml Nml Nml    Left 1stDorInt Ulnar C8-T1 Nml Nml Nml Nml Nml Nml Nml    Left PronatorTeres Median C6-7 Nml Nml Nml Nml Nml Nml Nml      Waveforms:

## 2022-11-28 ENCOUNTER — E-VISIT (OUTPATIENT)
Dept: INTERNAL MEDICINE CLINIC | Age: 62
End: 2022-11-28
Payer: MEDICARE

## 2022-11-28 DIAGNOSIS — U07.1 COVID-19: Primary | ICD-10-CM

## 2022-11-28 PROCEDURE — G9899 SCRN MAM PERF RSLTS DOC: HCPCS | Performed by: INTERNAL MEDICINE

## 2022-11-28 PROCEDURE — 99421 OL DIG E/M SVC 5-10 MIN: CPT | Performed by: INTERNAL MEDICINE

## 2022-11-28 NOTE — PROGRESS NOTES
E-Visit Note:    HPI: per patient questionnaire, this has been reviewed  EXAM: n/a    ----------------------------------  Diagnoses and all orders for this visit:    1. COVID-19       PLAN:   Will offer anti-viral, but may do okay off medications. Addendum:  medication sent in.    ----------------------------------  The patient was advised to call if these symptoms worsen or fail to improve as anticipated. 5-10 minutes were spent on the digital evaluation and management of this patient.        Maryuri Arellano MD

## 2023-01-09 ENCOUNTER — TELEPHONE (OUTPATIENT)
Dept: INTERNAL MEDICINE CLINIC | Age: 63
End: 2023-01-09

## 2023-01-09 NOTE — TELEPHONE ENCOUNTER
Notified patient per MD note. Patient has tried melatonin and could not sleep. Grieving, lost son. Advise will discuss further at upcoming appt. Advise patient grief counseling. Understanding verbalized.

## 2023-01-09 NOTE — TELEPHONE ENCOUNTER
Notes reviewed, saw Dr Jane Keenan in July. Was given xanax, not ativan. Has been going on for 6 months, would hold off on prescribing a new medication until I can talk to her. Can try Benadryl, 1-2 tabs or melatonin 5 or 10mg.

## 2023-01-09 NOTE — TELEPHONE ENCOUNTER
Reason for call:  TC from pt - pt states she lost her son in June and is having difficulty with anxiety and sleep - pt scheduled appt to see PCP on 01/16/2023 to discuss problems she is having - inquiring if there is anything she can take while waiting for appt - pt states she took Ativan in the past and it did not help her.      Is this a new problem: no     Date of last appointment:  7/7/2022     Can we respond via [employer]: no    Best call back number: [phone]

## 2023-01-16 ENCOUNTER — OFFICE VISIT (OUTPATIENT)
Dept: INTERNAL MEDICINE CLINIC | Age: 63
End: 2023-01-16
Payer: MEDICARE

## 2023-01-16 VITALS
RESPIRATION RATE: 18 BRPM | DIASTOLIC BLOOD PRESSURE: 88 MMHG | HEART RATE: 80 BPM | WEIGHT: 200 LBS | OXYGEN SATURATION: 98 % | BODY MASS INDEX: 34.15 KG/M2 | TEMPERATURE: 98.2 F | SYSTOLIC BLOOD PRESSURE: 123 MMHG | HEIGHT: 64 IN

## 2023-01-16 DIAGNOSIS — F33.41 RECURRENT MAJOR DEPRESSIVE DISORDER, IN PARTIAL REMISSION (HCC): ICD-10-CM

## 2023-01-16 DIAGNOSIS — F43.21 COMPLICATED GRIEF: Primary | ICD-10-CM

## 2023-01-16 DIAGNOSIS — F41.9 ANXIETY: ICD-10-CM

## 2023-01-16 PROCEDURE — 99214 OFFICE O/P EST MOD 30 MIN: CPT | Performed by: INTERNAL MEDICINE

## 2023-01-16 PROCEDURE — 3017F COLORECTAL CA SCREEN DOC REV: CPT | Performed by: INTERNAL MEDICINE

## 2023-01-16 PROCEDURE — G9717 DOC PT DX DEP/BP F/U NT REQ: HCPCS | Performed by: INTERNAL MEDICINE

## 2023-01-16 PROCEDURE — G8427 DOCREV CUR MEDS BY ELIG CLIN: HCPCS | Performed by: INTERNAL MEDICINE

## 2023-01-16 PROCEDURE — G9899 SCRN MAM PERF RSLTS DOC: HCPCS | Performed by: INTERNAL MEDICINE

## 2023-01-16 PROCEDURE — G8417 CALC BMI ABV UP PARAM F/U: HCPCS | Performed by: INTERNAL MEDICINE

## 2023-01-16 PROCEDURE — G0463 HOSPITAL OUTPT CLINIC VISIT: HCPCS | Performed by: INTERNAL MEDICINE

## 2023-01-16 RX ORDER — ALCAFTADINE 2.5 MG/ML
SOLUTION/ DROPS OPHTHALMIC
COMMUNITY
Start: 2022-11-15

## 2023-01-16 RX ORDER — ESCITALOPRAM OXALATE 5 MG/1
5 TABLET ORAL DAILY
Qty: 30 TABLET | Refills: 2 | Status: SHIPPED | OUTPATIENT
Start: 2023-01-16

## 2023-01-16 RX ORDER — PREDNISONE 5 MG/1
2.5 TABLET ORAL DAILY
COMMUNITY
Start: 2023-01-02

## 2023-01-16 RX ORDER — METHOTREXATE 25 MG/ML
INJECTION INTRA-ARTERIAL; INTRAMUSCULAR; INTRATHECAL; INTRAVENOUS
COMMUNITY
Start: 2022-12-20

## 2023-01-16 NOTE — ASSESSMENT & PLAN NOTE
Well controlled in the past, has been off medications, worsening now due to death of her son. Will start meds as discussed above.

## 2023-01-16 NOTE — PROGRESS NOTES
Jenny Michelle is a 63 y.o. female who was seen in clinic today (2023) for an acute visit. Assessment & Plan:   Below is the assessment and plan developed based on review of pertinent history, physical exam, labs, studies, and medications. 1. Complicated grief  Assessment & Plan:  New dx to me, chronic issue, I reviewed treatment options with her, I recommended to see a counselor, I reviewed life style changes to help improve mood, following changes were made today: will start on Lexapro. She was on this before and her daughter is on this. Reviewed expectations and did review Wellbutrin. We did review as needed medications but will defer for now. Orders:  -     escitalopram oxalate (LEXAPRO) 5 mg tablet; Take 1 Tablet by mouth daily. , Normal, Disp-30 Tablet, R-2  2. Recurrent major depressive disorder, in partial remission [facility])  Assessment & Plan:  Well controlled in the past, has been off medications, worsening now due to death of her son. Will start meds as discussed above. 3. Anxiety  Assessment & Plan:  Well controlled in the past, has been off medications, worsening now due to death of her son. Will start meds as discussed above. Follow-up and Dispositions    Return in about 6 weeks (around 2023) for Regular follow up. Subjective/Objective: Sharmila Lua was seen today for Depression and Anxiety    Son  in .  Was at [facility] ER, left after not being seen. He passed out. Was taken to [facility]. He  7 hrs later. She is not sure what happened. She reports autopsy and death certificate were not clear, but she has not been able to look at it. Patient is seen for grief. She is crying on/off. She is feeling sad. She is not on medications. Was on Cymbalta and Effexor in the past, did not like the way Cymbalta made her feel. Prior to Admission medications    Medication Sig Start Date End Date Taking?  Authorizing Provider   predniSONE (DELTASONE) 5 mg tablet Take 2.5 mg by mouth daily. 1/2/23  Yes Provider, Historical   methotrexate, PF, 25 mg/mL injection INJECT 0.6 ML UNDER THE SKIN 1 DAY A WEEK 90 DAY(S) 12/20/22  Yes Provider, Historical   Lastacaft Once Daily Relief 0.25 % drop INSTILL 1 DROP INTO BOTH EYES IN THE MORNING 11/15/22  Yes Provider, Historical   gabapentin (NEURONTIN) 300 mg capsule Take 1 Capsule by mouth two (2) times a day. Max Daily Amount: 600 mg. 10/27/22  Yes Bk Hernandez NP   metoprolol succinate (TOPROL-XL) 25 mg XL tablet Take 0.5 Tablets by mouth daily. 8/30/22  Yes Power Carver MD   pantoprazole (PROTONIX) 40 mg tablet Take 1 Tablet by mouth daily. 5/16/22  Yes Vahid Yan MD   Allergy Relief, cetirizine, 10 mg tablet TAKE 1 TAB BY MOUTH DAILY. FOR ALLERGY 3/16/22  Yes Dawn Sosa NP   methotrexate 25 mg/mL chemo injection INJECT 0.6ML SUBCUTANEOUS 1 DAY A WEEK 3/2/22  Yes Provider, Historical   BD Allergy Syringe 1 mL 28 gauge x 1/2\" syrg INJECT 0.6 ML UNDER THE SKIN ONCE A WEEK 3/3/22  Yes Provider, Historical   cyclobenzaprine (FLEXERIL) 10 mg tablet TAKE 1 TABLET BY MOUTH THREE TIMES A DAY AS NEEDED FOR MUSCLE SPASMS 12/16/20  Yes Provider, Historical   diclofenac (VOLTAREN) 1 % gel APPLY 2G TO AFFECTED JOINT FOUR TIMES DAILY (MAX UP TO 8 G/ PER DAY) 4/23/20  Yes Provider, Historical   OTHER Refresh eye drops   Yes Provider, Historical   aspirin delayed-release 81 mg tablet Take  by mouth daily. Yes Provider, Historical   cholecalciferol (VITAMIN D3) (2,000 UNITS /50 MCG) cap capsule Take  by mouth two (2) times a day. Yes Provider, Historical   mometasone (NASONEX) 50 mcg/actuation nasal spray 2 SPRAYS BY BOTH NOSTRILS ROUTE DAILY. FOR ALLERGY SYMPTOMS 9/14/19  Yes Sheila Vasquez MD   folic acid (FOLVITE) 1 mg tablet TAKE 1 TABLET BY MOUTH ONCE DAILY 3/26/17  Yes Provider, Historical   ascorbic acid, vitamin C, (VITAMIN C) 500 mg tablet Take  by mouth daily.    Yes Provider, Historical   diazePAM (Valium) 10 mg tablet Take 1 tablet 30-45 minutes before MRI. Must have  drive home after. Patient not taking: Reported on 1/16/2023 10/27/22   Sedrick Hernandez, NP   olopatadine (PATADAY) 0.2 % drop ophthalmic solution Administer 1 Drop to both eyes daily. Patient not taking: Reported on 1/16/2023    Provider, Historical           Physical Exam  Constitutional:       Comments: Well groomed   Psychiatric:      Comments: Crying intermittently       Visit Vitals  /88   Pulse 80   Temp 98.2 °F (36.8 °C) (Temporal)   Resp 18   Ht 5' 4\" (1.626 m)   Wt 200 lb (90.7 kg)   LMP 10/31/1986 Comment: Partial hysterectomy   SpO2 98%   BMI 34.33 kg/m²         Advised her to call back or return to office if symptoms worsen/change/persist.  Discussed expected course/resolution/complications of diagnosis in detail with patient. Medication risks/benefits/costs/interactions/alternatives discussed with patient.     Pan Tyler MD

## 2023-01-16 NOTE — ASSESSMENT & PLAN NOTE
New dx to me, chronic issue, I reviewed treatment options with her, I recommended to see a counselor, I reviewed life style changes to help improve mood, following changes were made today: will start on Lexapro. She was on this before and her daughter is on this. Reviewed expectations and did review Wellbutrin. We did review as needed medications but will defer for now.

## 2023-02-08 ENCOUNTER — OFFICE VISIT (OUTPATIENT)
Dept: NEUROLOGY | Age: 63
End: 2023-02-08
Payer: MEDICARE

## 2023-02-08 VITALS
HEART RATE: 71 BPM | DIASTOLIC BLOOD PRESSURE: 88 MMHG | SYSTOLIC BLOOD PRESSURE: 132 MMHG | WEIGHT: 200 LBS | BODY MASS INDEX: 34.15 KG/M2 | OXYGEN SATURATION: 98 % | HEIGHT: 64 IN

## 2023-02-08 DIAGNOSIS — R94.131 ABNORMAL EMG: Primary | ICD-10-CM

## 2023-02-08 DIAGNOSIS — R20.2 PARESTHESIA OF BOTH FEET: ICD-10-CM

## 2023-02-08 DIAGNOSIS — R20.2 ARM PARESTHESIA, LEFT: ICD-10-CM

## 2023-02-08 DIAGNOSIS — M48.062 SPINAL STENOSIS OF LUMBAR REGION WITH NEUROGENIC CLAUDICATION: ICD-10-CM

## 2023-02-08 DIAGNOSIS — R51.9 NEW ONSET OF HEADACHES AFTER AGE 50: ICD-10-CM

## 2023-02-08 DIAGNOSIS — R42 DIZZINESS: ICD-10-CM

## 2023-02-08 DIAGNOSIS — G44.321 INTRACTABLE CHRONIC POST-TRAUMATIC HEADACHE: ICD-10-CM

## 2023-02-08 PROCEDURE — G8428 CUR MEDS NOT DOCUMENT: HCPCS | Performed by: NURSE PRACTITIONER

## 2023-02-08 PROCEDURE — G9717 DOC PT DX DEP/BP F/U NT REQ: HCPCS | Performed by: NURSE PRACTITIONER

## 2023-02-08 PROCEDURE — 99215 OFFICE O/P EST HI 40 MIN: CPT | Performed by: NURSE PRACTITIONER

## 2023-02-08 PROCEDURE — G9899 SCRN MAM PERF RSLTS DOC: HCPCS | Performed by: NURSE PRACTITIONER

## 2023-02-08 PROCEDURE — 3017F COLORECTAL CA SCREEN DOC REV: CPT | Performed by: NURSE PRACTITIONER

## 2023-02-08 PROCEDURE — G8417 CALC BMI ABV UP PARAM F/U: HCPCS | Performed by: NURSE PRACTITIONER

## 2023-02-08 RX ORDER — DIAZEPAM 10 MG/1
TABLET ORAL
Qty: 1 TABLET | Refills: 0 | Status: SHIPPED | OUTPATIENT
Start: 2023-02-08

## 2023-02-08 RX ORDER — CLOTRIMAZOLE AND BETAMETHASONE DIPROPIONATE 10; .64 MG/G; MG/G
CREAM TOPICAL 2 TIMES DAILY
Qty: 15 G | Refills: 3 | Status: SHIPPED | OUTPATIENT
Start: 2023-02-08

## 2023-02-08 RX ORDER — TIZANIDINE 4 MG/1
4 TABLET ORAL
Qty: 30 TABLET | Refills: 5 | Status: SHIPPED | OUTPATIENT
Start: 2023-02-08

## 2023-02-08 NOTE — PROGRESS NOTES
Mann Hayden is a 63 y.o. female who presents with the following  Chief Complaint   Patient presents with    Follow-up    Results       HPI    Following up for tingling in the legs   She states earlier this year she has developed worsening paresthesia in both legs  Had EMG and abnormal   Along with left hand CTS   Gabapentin has helped at 300 mg BID   Wants to keep this for now. Did some exercises at home with no relief. Will get back with PT   It gets worse around the ankles to the toes  She does have symptoms up-and-down but this is where it stays usually    When she does more it gets worse  She feels pins-and-needles and stabbing pains  She does feel like she is unsteady when she is moving  She also has left arm paresthesias she states that she feels like it comes from the elbow   fingertips will go numb a lot and have trouble coming back  No falls that have caused injury  Has neck and low back pain   These are flaring   Also has some toe nail fungal infection   Discussed getting with podiatry             Allergies   Allergen Reactions    Percocet [Oxycodone-Acetaminophen] Other (comments)     Slows heart rate. Current Outpatient Medications   Medication Sig    tiZANidine (ZANAFLEX) 4 mg tablet Take 1 Tablet by mouth nightly. diazePAM (Valium) 10 mg tablet Take 1 tablet 30-45 minutes before MRI. Must have  drive home after. clotrimazole-betamethasone (LOTRISONE) topical cream Apply  to affected area two (2) times a day. predniSONE (DELTASONE) 5 mg tablet Take 2.5 mg by mouth daily. methotrexate, PF, 25 mg/mL injection INJECT 0.6 ML UNDER THE SKIN 1 DAY A WEEK 90 DAY(S)    Lastacaft Once Daily Relief 0.25 % drop INSTILL 1 DROP INTO BOTH EYES IN THE MORNING    escitalopram oxalate (LEXAPRO) 5 mg tablet Take 1 Tablet by mouth daily. gabapentin (NEURONTIN) 300 mg capsule Take 1 Capsule by mouth two (2) times a day.  Max Daily Amount: 600 mg.    metoprolol succinate (TOPROL-XL) 25 mg XL tablet Take 0.5 Tablets by mouth daily. pantoprazole (PROTONIX) 40 mg tablet Take 1 Tablet by mouth daily. Allergy Relief, cetirizine, 10 mg tablet TAKE 1 TAB BY MOUTH DAILY. FOR ALLERGY    methotrexate 25 mg/mL chemo injection INJECT 0.6ML SUBCUTANEOUS 1 DAY A WEEK    BD Allergy Syringe 1 mL 28 gauge x 1/2\" syrg INJECT 0.6 ML UNDER THE SKIN ONCE A WEEK    cyclobenzaprine (FLEXERIL) 10 mg tablet TAKE 1 TABLET BY MOUTH THREE TIMES A DAY AS NEEDED FOR MUSCLE SPASMS    diclofenac (VOLTAREN) 1 % gel APPLY 2G TO AFFECTED JOINT FOUR TIMES DAILY (MAX UP TO 8 G/ PER DAY)    OTHER Refresh eye drops    aspirin delayed-release 81 mg tablet Take  by mouth daily. olopatadine (PATADAY) 0.2 % drop ophthalmic solution Administer 1 Drop to both eyes daily. (Patient not taking: Reported on 1/16/2023)    cholecalciferol (VITAMIN D3) (2,000 UNITS /50 MCG) cap capsule Take  by mouth two (2) times a day. mometasone (NASONEX) 50 mcg/actuation nasal spray 2 SPRAYS BY BOTH NOSTRILS ROUTE DAILY. FOR ALLERGY SYMPTOMS    folic acid (FOLVITE) 1 mg tablet TAKE 1 TABLET BY MOUTH ONCE DAILY    ascorbic acid, vitamin C, (VITAMIN C) 500 mg tablet Take  by mouth daily. No current facility-administered medications for this visit. Social History     Tobacco Use   Smoking Status Never   Smokeless Tobacco Never       Past Medical History:   Diagnosis Date    Acute meniscal tear of knee 08/15/2017    Dr. Austyn Aguilar    Acute torn meniscus of knee     Right knee    Allergic rhinitis 6/16/2014    Anxiety 7/14/2014    Arthritis     Bone spur     Left shoulder    Dense breast tissue on mammogram     Depression 7/14/2014    Family history of premature CAD 7/15/2015    GERD (gastroesophageal reflux disease) 9/10/2014    Headache     Hx of mammogram April 2014    WNL per pt.     Hyperlipidemia 7/28/2015    Insomnia 7/14/2014    Neuralgia     Occipital neuralgia    Occipital neuralgia 6/16/2014    RA (rheumatoid arthritis) ([facility] [address]     Screening for malignant neoplasm of the cervix Approx. 7 yrs ago    Negative per pt. Past Surgical History:   Procedure Laterality Date    COLONOSCOPY N/A 8/13/2020    diverticulosis - performed by Chaka Jessica MD at [facility].    HX ENDOSCOPY  2019    esophageal dilation with 20mm sized balloon    HX ENDOSCOPY  02/25/2022    s/p dilation    HX HYSTERECTOMY      HX KNEE ARTHROSCOPY Right 04/16/2014    HX PELVIC LAPAROSCOPY  2001    Laproscopy of abdomen - removal of scar tissue    HX SHOULDER ARTHROSCOPY Left 06/2013    HX SHOULDER ARTHROSCOPY Left 03/2013    Shaved bone spur     HX TOTAL ABDOMINAL HYSTERECTOMY  1999       Family History   Problem Relation Age of Onset    Diabetes Mother     Cancer Mother         cervical    Heart Disease Father 80        MI    Prostate Cancer Father     Heart Attack Brother 62        MI    Breast Cancer Sister     Diabetes Sister     Breast Cancer Sister     Lung Cancer Sister     Diabetes Sister     Hypertension Sister     Throat cancer Sister     No Known Problems Sister     Leukemia Brother     Hypertension Brother     Other Brother         prediabetic    Lung Cancer Brother     Heart Failure Brother     Thyroid Disease Daughter     No Known Problems Daughter         unknown issues    No Known Problems Son     Heart Disease Paternal Grandmother     Heart Attack Sister         MI       Social History     Socioeconomic History    Marital status:    Tobacco Use    Smoking status: Never    Smokeless tobacco: Never   Vaping Use    Vaping Use: Never used   Substance and Sexual Activity    Alcohol use: Not Currently     Alcohol/week: 5.0 standard drinks     Types: 5 Standard drinks or equivalent per week     Comment: Beer and wine    Drug use: No    Sexual activity: Yes     Partners: Male     Birth control/protection: Surgical       Review of Systems   Eyes:  Negative for blurred vision, double vision and photophobia.    Respiratory:  Negative for shortness of breath and wheezing. Musculoskeletal:  Positive for back pain, joint pain and neck pain. Neurological:  Positive for tingling, sensory change and weakness. Remainder of comprehensive review is negative. Physical Exam :    Visit Vitals  /88 (BP 1 Location: Left upper arm, BP Patient Position: Sitting, BP Cuff Size: Large adult)   Pulse 71   Ht 5' 4\" (1.626 m)   Wt 90.7 kg (200 lb)   LMP 10/31/1986 Comment: Partial hysterectomy   SpO2 98%   BMI 34.33 kg/m²       Results for orders placed or performed during the hospital encounter of 06/24/22   CBC WITH AUTOMATED DIFF   Result Value Ref Range    WBC 7.3 3.6 - 11.0 K/uL    RBC 4.91 3.80 - 5.20 M/uL    HGB 13.9 11.5 - 16.0 g/dL    HCT 40.0 35.0 - 47.0 %    MCV 81.5 80.0 - 99.0 FL    MCH 28.3 26.0 - 34.0 PG    MCHC 34.8 30.0 - 36.5 g/dL    RDW 14.8 (H) 11.5 - 14.5 %    PLATELET 760 437 - 307 K/uL    MPV 9.5 8.9 - 12.9 FL    NRBC 0.0 0  WBC    ABSOLUTE NRBC 0.00 0.00 - 0.01 K/uL    NEUTROPHILS 63 32 - 75 %    LYMPHOCYTES 30 12 - 49 %    MONOCYTES 6 5 - 13 %    EOSINOPHILS 1 0 - 7 %    BASOPHILS 0 0 - 1 %    IMMATURE GRANULOCYTES 0 0.0 - 0.5 %    ABS. NEUTROPHILS 4.5 1.8 - 8.0 K/UL    ABS. LYMPHOCYTES 2.2 0.8 - 3.5 K/UL    ABS. MONOCYTES 0.5 0.0 - 1.0 K/UL    ABS. EOSINOPHILS 0.1 0.0 - 0.4 K/UL    ABS. BASOPHILS 0.0 0.0 - 0.1 K/UL    ABS. IMM.  GRANS. 0.0 0.00 - 0.04 K/UL    DF AUTOMATED     METABOLIC PANEL, COMPREHENSIVE   Result Value Ref Range    Sodium 140 136 - 145 mmol/L    Potassium 4.1 3.5 - 5.1 mmol/L    Chloride 109 (H) 97 - 108 mmol/L    CO2 21 21 - 32 mmol/L    Anion gap 10 5 - 15 mmol/L    Glucose 113 (H) 65 - 100 mg/dL    BUN 12 6 - 20 MG/DL    Creatinine 1.12 (H) 0.55 - 1.02 MG/DL    BUN/Creatinine ratio 11 (L) 12 - 20      GFR est AA >60 >60 ml/min/1.73m2    GFR est non-AA 50 (L) >60 ml/min/1.73m2    Calcium 10.3 (H) 8.5 - 10.1 MG/DL    Bilirubin, total 0.3 0.2 - 1.0 MG/DL    ALT (SGPT) 32 12 - 78 U/L    AST (SGOT) 22 15 - 37 U/L    Alk. phosphatase 135 (H) 45 - 117 U/L    Protein, total 7.9 6.4 - 8.2 g/dL    Albumin 3.9 3.5 - 5.0 g/dL    Globulin 4.0 2.0 - 4.0 g/dL    A-G Ratio 1.0 (L) 1.1 - 2.2     TROPONIN-HIGH SENSITIVITY   Result Value Ref Range    Troponin-High Sensitivity 9 0 - 51 ng/L   SAMPLES BEING HELD   Result Value Ref Range    SAMPLES BEING HELD 1RED 1BLU     COMMENT        Add-on orders for these samples will be processed based on acceptable specimen integrity and analyte stability, which may vary by analyte. EKG, 12 LEAD, INITIAL   Result Value Ref Range    Ventricular Rate 105 BPM    Atrial Rate 105 BPM    P-R Interval 162 ms    QRS Duration 76 ms    Q-T Interval 344 ms    QTC Calculation (Bezet) 454 ms    Calculated P Axis 44 degrees    Calculated R Axis -18 degrees    Calculated T Axis 4 degrees    Diagnosis       Sinus tachycardia  Moderate voltage criteria for LVH, may be normal variant ( R in aVL , Eder   product )  Septal infarct , age undetermined  When compared with ECG of 06-AUG-2015 10:56,  premature ventricular complexes are no longer present  Septal infarct is now present  Nonspecific T wave abnormality now evident in Lateral leads  Confirmed by Brandi Frames ([id number]) on 6/25/2022 8:47:37 AM         Orders Placed This Encounter    MRI LUMB SPINE W WO CONT     Standing Status:   Future     Standing Expiration Date:   3/8/2024     Order Specific Question:   STAT Creatinine as indicated     Answer:   Yes    REFERRAL TO PHYSICAL THERAPY     Referral Priority:   Routine     Referral Type:   PT/OT/ST     Referral Reason:   Specialty Services Required     Number of Visits Requested:   1    tiZANidine (ZANAFLEX) 4 mg tablet     Sig: Take 1 Tablet by mouth nightly. Dispense:  30 Tablet     Refill:  5    diazePAM (Valium) 10 mg tablet     Sig: Take 1 tablet 30-45 minutes before MRI. Must have  drive home after.      Dispense:  1 Tablet     Refill:  0    clotrimazole-betamethasone (LOTRISONE) topical cream     Sig: Apply  to affected area two (2) times a day. Dispense:  15 g     Refill:  3       1. Abnormal EMG    2. Spinal stenosis of lumbar region with neurogenic claudication    3. Arm paresthesia, left    4. Paresthesia of both feet    5. New onset of headaches after age 48    6. Intractable chronic post-traumatic headache    7.  Dizziness      Gabapentin has helped symptom management  Add Zanaflex nightly for sleep and muscle spasms  Stop the Flexeril though  Give her some cream for topical fungal infection in her toenails but we will get her to a foot specialist [place]-door    MRI of the lumbar spine to evaluate the abnormality on her EMG further as radiculopathy and because of including stenosis or degenerative disc  We discussed the testing in full of the EMG and MRI of the brain which was normal  Refer to physical therapy for neck and back discomfort  Valium for the MRI                This note will not be viewable in MyChart

## 2023-03-07 ENCOUNTER — HOSPITAL ENCOUNTER (OUTPATIENT)
Dept: MRI IMAGING | Age: 63
Discharge: HOME | End: 2023-03-07
Attending: NURSE PRACTITIONER
Payer: MEDICARE

## 2023-03-07 ENCOUNTER — OFFICE VISIT (OUTPATIENT)
Dept: INTERNAL MEDICINE CLINIC | Age: 63
End: 2023-03-07
Payer: MEDICARE

## 2023-03-07 VITALS
SYSTOLIC BLOOD PRESSURE: 151 MMHG | RESPIRATION RATE: 18 BRPM | WEIGHT: 200 LBS | TEMPERATURE: 98.1 F | HEART RATE: 77 BPM | BODY MASS INDEX: 34.15 KG/M2 | OXYGEN SATURATION: 99 % | HEIGHT: 64 IN | DIASTOLIC BLOOD PRESSURE: 91 MMHG

## 2023-03-07 DIAGNOSIS — M48.062 SPINAL STENOSIS OF LUMBAR REGION WITH NEUROGENIC CLAUDICATION: ICD-10-CM

## 2023-03-07 DIAGNOSIS — R03.0 ELEVATED BP WITHOUT DIAGNOSIS OF HYPERTENSION: ICD-10-CM

## 2023-03-07 DIAGNOSIS — R51.9 SINUS HEADACHE: ICD-10-CM

## 2023-03-07 DIAGNOSIS — R94.131 ABNORMAL EMG: ICD-10-CM

## 2023-03-07 DIAGNOSIS — F43.21 COMPLICATED GRIEF: Primary | ICD-10-CM

## 2023-03-07 DIAGNOSIS — F43.21 COMPLICATED GRIEF: ICD-10-CM

## 2023-03-07 PROCEDURE — 72158 MRI LUMBAR SPINE W/O & W/DYE: CPT

## 2023-03-07 PROCEDURE — A9576 INJ PROHANCE MULTIPACK: HCPCS | Performed by: RADIOLOGY

## 2023-03-07 PROCEDURE — 74011250636 HC RX REV CODE- 250/636: Performed by: RADIOLOGY

## 2023-03-07 PROCEDURE — G0463 HOSPITAL OUTPT CLINIC VISIT: HCPCS | Performed by: INTERNAL MEDICINE

## 2023-03-07 PROCEDURE — G9717 DOC PT DX DEP/BP F/U NT REQ: HCPCS | Performed by: INTERNAL MEDICINE

## 2023-03-07 PROCEDURE — 99214 OFFICE O/P EST MOD 30 MIN: CPT | Performed by: INTERNAL MEDICINE

## 2023-03-07 PROCEDURE — G8417 CALC BMI ABV UP PARAM F/U: HCPCS | Performed by: INTERNAL MEDICINE

## 2023-03-07 PROCEDURE — 3017F COLORECTAL CA SCREEN DOC REV: CPT | Performed by: INTERNAL MEDICINE

## 2023-03-07 PROCEDURE — G9899 SCRN MAM PERF RSLTS DOC: HCPCS | Performed by: INTERNAL MEDICINE

## 2023-03-07 PROCEDURE — G8427 DOCREV CUR MEDS BY ELIG CLIN: HCPCS | Performed by: INTERNAL MEDICINE

## 2023-03-07 RX ORDER — TRAZODONE HYDROCHLORIDE 100 MG/1
50-100 TABLET ORAL
Qty: 30 TABLET | Refills: 0 | Status: SHIPPED | OUTPATIENT
Start: 2023-03-07 | End: 2023-03-10 | Stop reason: SDUPTHER

## 2023-03-07 RX ADMIN — GADOTERIDOL 18 ML: 279.3 INJECTION, SOLUTION INTRAVENOUS at 20:20

## 2023-03-07 NOTE — TELEPHONE ENCOUNTER
Requested Prescriptions     Pending Prescriptions Disp Refills    traZODone (DESYREL) 100 mg tablet 30 Tablet 0     Sig: Take 0.5-1 Tablets by mouth nightly as needed for Sleep.

## 2023-03-07 NOTE — ASSESSMENT & PLAN NOTE
Recurrent, improved and doing well, I reviewed treatment options with her, I reviewed life style changes to help improve mood, continue current treatment. We did review increasing the dose but will defer. Will continue on this for another 3 months and then re-evaluate if she can stop it.

## 2023-03-07 NOTE — PROGRESS NOTES
Donald Sosa is a 63 y.o. female who was seen in clinic today (3/7/2023) for a regular follow up. Assessment & Plan:   Below is the assessment and plan developed based on review of pertinent history, physical exam, labs, studies, and medications. 1. Complicated grief  Assessment & Plan:  Recurrent, improved and doing well, I reviewed treatment options with her, I reviewed life style changes to help improve mood, continue current treatment. We did review increasing the dose but will defer. Will continue on this for another 3 months and then re-evaluate if she can stop it. Orders:  -     traZODone (DESYREL) 100 mg tablet; Take 0.5-1 Tablets by mouth nightly as needed for Sleep., Normal, Disp-30 Tablet, R-0  2. Sinus headache  Comments:  new dx, differential reviewed, do not think this is med related, will change around nasals steroid & anti-histamines (see AVS), red flags & expectations reviewe  3. Elevated BP without diagnosis of hypertension  Comments:  normally well controlled, has f/u with cardiology in 1 month and then me in 3 months, no changes today. Recommended to check amb BP and update me in 2 wks    Follow-up and Dispositions    Return in about 3 months (around 2023) for FULL PHYSICAL. Subjective/Objective: Amalia Dela Cruz was seen today for Depression and Anxiety    Mental Health Review  Patient is seen for grief. She was last seen in . Her son  unexpectedly in . Started on Lexapro 5mg at that visit. She feels she is doing better. She using a pray line 2-3 times/week. Any available GAD7 and PHQ9 reviewed. Reports experiences the following side effects from the treatment:  headaches. She started medication the middle of January and has been having h/a since the middle/end of February. She is also having trouble getting to sleep. No issues staying asleep. She was using Tizanidine but it caused her to talk in her sleep.     She has been on Cymbalta and Effexor in the past, did not like the way Cymbalta made her feel. Headache is forehead/temple pain, it is on/off, throbbing, worse w/ standing, improved w/ laying down. Prior to Admission medications    Medication Sig Start Date End Date Taking? Authorizing Provider   diazePAM (Valium) 10 mg tablet Take 1 tablet 30-45 minutes before MRI. Must have  drive home after. 2/8/23  Yes Masoud Hernandez NP   clotrimazole-betamethasone (LOTRISONE) topical cream Apply  to affected area two (2) times a day. 2/8/23  Yes Masoud Hernandez NP   predniSONE (DELTASONE) 5 mg tablet Take 2.5 mg by mouth daily. 1/2/23  Yes Provider, Historical   methotrexate, PF, 25 mg/mL injection INJECT 0.6 ML UNDER THE SKIN 1 DAY A WEEK 90 DAY(S) 12/20/22  Yes Provider, Historical   Lastacaft Once Daily Relief 0.25 % drop INSTILL 1 DROP INTO BOTH EYES IN THE MORNING 11/15/22  Yes Provider, Historical   escitalopram oxalate (LEXAPRO) 5 mg tablet Take 1 Tablet by mouth daily. 1/16/23  Yes Shireen Rios MD   gabapentin (NEURONTIN) 300 mg capsule Take 1 Capsule by mouth two (2) times a day. Max Daily Amount: 600 mg. 10/27/22  Yes Masoud Hernandez NP   metoprolol succinate (TOPROL-XL) 25 mg XL tablet Take 0.5 Tablets by mouth daily. 8/30/22  Yes Melyssa Carver MD   pantoprazole (PROTONIX) 40 mg tablet Take 1 Tablet by mouth daily. 5/16/22  Yes Shireen Rios MD   Allergy Relief, cetirizine, 10 mg tablet TAKE 1 TAB BY MOUTH DAILY.  FOR ALLERGY 3/16/22  Yes Galdino Kearney NP   methotrexate 25 mg/mL chemo injection INJECT 0.6ML SUBCUTANEOUS 1 DAY A WEEK 3/2/22  Yes Provider, Historical   BD Allergy Syringe 1 mL 28 gauge x 1/2\" syrg INJECT 0.6 ML UNDER THE SKIN ONCE A WEEK 3/3/22  Yes Provider, Historical   cyclobenzaprine (FLEXERIL) 10 mg tablet TAKE 1 TABLET BY MOUTH THREE TIMES A DAY AS NEEDED FOR MUSCLE SPASMS 12/16/20  Yes Provider, Historical   diclofenac (VOLTAREN) 1 % gel APPLY 2G TO AFFECTED JOINT FOUR TIMES DAILY (MAX UP TO 8 G/ PER DAY) 4/23/20  Yes Provider, Historical   OTHER Refresh eye drops   Yes Provider, Historical   aspirin delayed-release 81 mg tablet Take  by mouth daily. Yes Provider, Historical   cholecalciferol (VITAMIN D3) (2,000 UNITS /50 MCG) cap capsule Take  by mouth two (2) times a day. Yes Provider, Historical   mometasone (NASONEX) 50 mcg/actuation nasal spray 2 SPRAYS BY BOTH NOSTRILS ROUTE DAILY. FOR ALLERGY SYMPTOMS 9/14/19  Yes Linda Gomez MD   folic acid (FOLVITE) 1 mg tablet TAKE 1 TABLET BY MOUTH ONCE DAILY 3/26/17  Yes Provider, Historical   ascorbic acid, vitamin C, (VITAMIN C) 500 mg tablet Take  by mouth daily. Yes Provider, Historical   tiZANidine (ZANAFLEX) 4 mg tablet Take 1 Tablet by mouth nightly. Patient not taking: Reported on 3/7/2023 2/8/23   Emily Hernandez, RHONDA   olopatadine (PATADAY) 0.2 % drop ophthalmic solution Administer 1 Drop to both eyes daily. Patient not taking: No sig reported    Provider, Historical           Physical Exam  Constitutional:       Comments: Well groomed   Psychiatric:      Comments: Crying intermittently       Visit Vitals  BP (!) 151/91   Pulse 77   Temp 98.1 °F (36.7 °C) (Temporal)   Resp 18   Ht 5' 4\" (1.626 m)   Wt 200 lb (90.7 kg)   LMP 10/31/1986 Comment: Partial hysterectomy   SpO2 99%   BMI 34.33 kg/m²         Advised her to call back or return to office if symptoms worsen/change/persist.  Discussed expected course/resolution/complications of diagnosis in detail with patient. Medication risks/benefits/costs/interactions/alternatives discussed with patient.     Hugo Ambrosio MD

## 2023-03-08 RX ORDER — TRAZODONE HYDROCHLORIDE 100 MG/1
50-100 TABLET ORAL
Qty: 30 TABLET | Refills: 0 | OUTPATIENT
Start: 2023-03-08

## 2023-03-10 ENCOUNTER — TELEPHONE (OUTPATIENT)
Dept: INTERNAL MEDICINE CLINIC | Age: 63
End: 2023-03-10

## 2023-03-10 DIAGNOSIS — F43.21 COMPLICATED GRIEF: ICD-10-CM

## 2023-03-10 RX ORDER — TRAZODONE HYDROCHLORIDE 100 MG/1
50-100 TABLET ORAL
Qty: 30 TABLET | Refills: 0 | Status: SHIPPED | OUTPATIENT
Start: 2023-03-10

## 2023-03-10 NOTE — TELEPHONE ENCOUNTER
It was sent, see below. Will be resent. traZODone (DESYREL) 100 mg tablet 30 Tablet 0 3/7/2023     Sig - Route:  Take 0.5-1 Tablets by mouth nightly as needed for Sleep. - Oral    Sent to pharmacy as: traZODone 100 mg tablet (DESYREL)    E-Prescribing Status: Receipt confirmed by pharmacy (3/7/2023  3:40 PM EST)

## 2023-03-10 NOTE — TELEPHONE ENCOUNTER
Reason for call:  Spoke with pt, she was here on Tuesday. Per pt the pharmacy have not received any prescription for TrazoDone .     [id number] [place], [address]  [phone]    Is this a new problem: yes     Date of last appointment:  3/7/2023     Can we respond via Plan[employer]: no    Best call back number: Kamilla Lopes    [phone]

## 2023-03-13 ENCOUNTER — TELEPHONE (OUTPATIENT)
Dept: INTERNAL MEDICINE CLINIC | Age: 63
End: 2023-03-13

## 2023-03-13 NOTE — TELEPHONE ENCOUNTER
Patient requesting  callback with results of her lumbar MRI on 03/07. Patient cannot get into her mychart, so please call.

## 2023-03-13 NOTE — TELEPHONE ENCOUNTER
Notified patient results of MRI will need to be discussed with ordering provider Dr. Kya Otero. Understanding verbalized.

## 2023-05-03 ENCOUNTER — OFFICE VISIT (OUTPATIENT)
Dept: NEUROLOGY | Age: 63
End: 2023-05-03
Payer: MEDICARE

## 2023-05-03 VITALS — DIASTOLIC BLOOD PRESSURE: 94 MMHG | SYSTOLIC BLOOD PRESSURE: 132 MMHG

## 2023-05-03 DIAGNOSIS — M48.062 SPINAL STENOSIS OF LUMBAR REGION WITH NEUROGENIC CLAUDICATION: Primary | ICD-10-CM

## 2023-05-03 DIAGNOSIS — M48.02 CERVICAL STENOSIS OF SPINE: ICD-10-CM

## 2023-05-03 PROCEDURE — G9717 DOC PT DX DEP/BP F/U NT REQ: HCPCS | Performed by: NURSE PRACTITIONER

## 2023-05-03 PROCEDURE — G8417 CALC BMI ABV UP PARAM F/U: HCPCS | Performed by: NURSE PRACTITIONER

## 2023-05-03 PROCEDURE — 3017F COLORECTAL CA SCREEN DOC REV: CPT | Performed by: NURSE PRACTITIONER

## 2023-05-03 PROCEDURE — G8427 DOCREV CUR MEDS BY ELIG CLIN: HCPCS | Performed by: NURSE PRACTITIONER

## 2023-05-03 PROCEDURE — G9899 SCRN MAM PERF RSLTS DOC: HCPCS | Performed by: NURSE PRACTITIONER

## 2023-05-03 PROCEDURE — 99214 OFFICE O/P EST MOD 30 MIN: CPT | Performed by: NURSE PRACTITIONER

## 2023-05-03 RX ORDER — CYCLOBENZAPRINE HCL 10 MG
TABLET ORAL
Qty: 30 TABLET | Refills: 5 | Status: SHIPPED | OUTPATIENT
Start: 2023-05-03

## 2023-05-16 ENCOUNTER — OFFICE VISIT (OUTPATIENT)
Age: 63
End: 2023-05-16
Payer: MEDICARE

## 2023-05-16 VITALS
TEMPERATURE: 97.6 F | HEIGHT: 64 IN | DIASTOLIC BLOOD PRESSURE: 80 MMHG | RESPIRATION RATE: 18 BRPM | HEART RATE: 62 BPM | SYSTOLIC BLOOD PRESSURE: 128 MMHG | WEIGHT: 200 LBS | OXYGEN SATURATION: 98 % | BODY MASS INDEX: 34.15 KG/M2

## 2023-05-16 DIAGNOSIS — Z71.89 ADVANCED CARE PLANNING/COUNSELING DISCUSSION: ICD-10-CM

## 2023-05-16 DIAGNOSIS — E66.9 OBESITY (BMI 30.0-34.9): ICD-10-CM

## 2023-05-16 DIAGNOSIS — Z13.6 SCREENING FOR CARDIOVASCULAR CONDITION: ICD-10-CM

## 2023-05-16 DIAGNOSIS — G43.009 MIGRAINE WITHOUT AURA AND WITHOUT STATUS MIGRAINOSUS, NOT INTRACTABLE: ICD-10-CM

## 2023-05-16 DIAGNOSIS — F33.41 RECURRENT MAJOR DEPRESSIVE DISORDER, IN PARTIAL REMISSION (HCC): ICD-10-CM

## 2023-05-16 DIAGNOSIS — Z00.00 MEDICARE ANNUAL WELLNESS VISIT, SUBSEQUENT: Primary | ICD-10-CM

## 2023-05-16 DIAGNOSIS — R73.9 BLOOD GLUCOSE ELEVATED: ICD-10-CM

## 2023-05-16 DIAGNOSIS — M06.09 RHEUMATOID ARTHRITIS OF MULTIPLE SITES WITH NEGATIVE RHEUMATOID FACTOR (HCC): ICD-10-CM

## 2023-05-16 PROBLEM — F32.A DEPRESSION: Status: ACTIVE | Noted: 2023-01-16

## 2023-05-16 PROBLEM — E66.811 OBESITY (BMI 30.0-34.9): Status: ACTIVE | Noted: 2019-05-08

## 2023-05-16 PROBLEM — F32.A DEPRESSION: Status: RESOLVED | Noted: 2023-01-16 | Resolved: 2023-05-16

## 2023-05-16 PROCEDURE — G0439 PPPS, SUBSEQ VISIT: HCPCS | Performed by: INTERNAL MEDICINE

## 2023-05-16 PROCEDURE — 3017F COLORECTAL CA SCREEN DOC REV: CPT | Performed by: INTERNAL MEDICINE

## 2023-05-16 RX ORDER — METHOTREXATE 25 MG/ML
INJECTION, SOLUTION INTRA-ARTERIAL; INTRAMUSCULAR; INTRAVENOUS
COMMUNITY
Start: 2023-05-01

## 2023-05-16 SDOH — ECONOMIC STABILITY: FOOD INSECURITY: WITHIN THE PAST 12 MONTHS, THE FOOD YOU BOUGHT JUST DIDN'T LAST AND YOU DIDN'T HAVE MONEY TO GET MORE.: NEVER TRUE

## 2023-05-16 SDOH — ECONOMIC STABILITY: HOUSING INSECURITY
IN THE LAST 12 MONTHS, WAS THERE A TIME WHEN YOU DID NOT HAVE A STEADY PLACE TO SLEEP OR SLEPT IN A SHELTER (INCLUDING NOW)?: NO

## 2023-05-16 SDOH — ECONOMIC STABILITY: FOOD INSECURITY: WITHIN THE PAST 12 MONTHS, YOU WORRIED THAT YOUR FOOD WOULD RUN OUT BEFORE YOU GOT MONEY TO BUY MORE.: NEVER TRUE

## 2023-05-16 SDOH — ECONOMIC STABILITY: INCOME INSECURITY: HOW HARD IS IT FOR YOU TO PAY FOR THE VERY BASICS LIKE FOOD, HOUSING, MEDICAL CARE, AND HEATING?: NOT HARD AT ALL

## 2023-05-16 ASSESSMENT — PATIENT HEALTH QUESTIONNAIRE - PHQ9
2. FEELING DOWN, DEPRESSED OR HOPELESS: 1
SUM OF ALL RESPONSES TO PHQ QUESTIONS 1-9: 2
SUM OF ALL RESPONSES TO PHQ QUESTIONS 1-9: 2
SUM OF ALL RESPONSES TO PHQ9 QUESTIONS 1 & 2: 2
SUM OF ALL RESPONSES TO PHQ QUESTIONS 1-9: 2
1. LITTLE INTEREST OR PLEASURE IN DOING THINGS: 1
SUM OF ALL RESPONSES TO PHQ QUESTIONS 1-9: 2

## 2023-05-16 ASSESSMENT — ENCOUNTER SYMPTOMS
CONSTIPATION: 0
ABDOMINAL PAIN: 0
NAUSEA: 0
BLOOD IN STOOL: 0
SHORTNESS OF BREATH: 0
VOMITING: 0
DIARRHEA: 0
COUGH: 0

## 2023-05-16 ASSESSMENT — LIFESTYLE VARIABLES
HOW OFTEN DO YOU HAVE A DRINK CONTAINING ALCOHOL: MONTHLY OR LESS
HOW MANY STANDARD DRINKS CONTAINING ALCOHOL DO YOU HAVE ON A TYPICAL DAY: 1 OR 2

## 2023-05-16 NOTE — PROGRESS NOTES
Medicare Annual Wellness Visit    Negar Guaman is here for Medicare AWV    Assessment & Plan     Medicare annual wellness visit, subsequent  Advanced care planning/counseling discussion  -     FULL CODE  Screening for cardiovascular condition  -     Lipid Panel; Future  Blood glucose elevated  -     Hemoglobin A1C; Future  Recurrent major depressive disorder, in partial remission ([facility] [address]  Assessment & Plan:  Previously grief, has been almost 1 year so will change to depression, single episode, I reviewed treatment options with her, I reviewed life style changes to help improve mood, continue current treatment. Did review trying to increase dose to 10mg but she wants to remain on 5mg. Did review when to try and wean off  Rheumatoid arthritis of multiple sites with negative rheumatoid factor ([facility] [address]  Assessment & Plan:  She reports stable. Monitored by specialist- no acute findings meriting change in the plan  Migraine without aura and without status migrainosus, not intractable  Assessment & Plan:  She reports stable. Monitored by specialist- no acute findings meriting change in the plan  Obesity (BMI 30.0-34. 9)  Assessment & Plan:  stable, needs improvement, I have recommended the following interventions: dietary management education, guidance, and counseling, encourage exercise and medication options reviewed. Recommendations for Preventive Services Due: see orders and patient instructions/AVS.  Recommended screening schedule for the next 5-10 years is provided to the patient in written form: see Patient Instructions/AVS.     Return in 1 year (on 5/16/2024). Subjective     Health Maintenance  Immunizations:   Covid: not up to date  Influenza: declined   Tetanus: up to date    Shingles: declined  Pneumonia: n/a  Cancer screening:   Cervical: reviewed guidelines, n/a - s/p RHODA  Breast: reviewed guidelines, up to date. Colon: reviewed guidelines, up to date.      DEXA: 8/21/20 - normal    The following

## 2023-05-16 NOTE — ACP (ADVANCE CARE PLANNING)
Advance Care Planning   Advance Care Planning (ACP) Physician/NP/PA (Provider) Conversation    Date of ACP Conversation: 05/16/23  Persons included in Conversation:  patient  Length of ACP Conversation in minutes: <16 minutes (Non-Billable)    Has ACP document(s) NOT on file - requested patient to provide.   She elects Full Code (Attempt Resuscitation)    The patient has appointed the following active healthcare agents:    Primary Decision Maker: Vincent Ellis Jiwdzj - [phone]     The Patient has the following current code status:    Code Status: Full Code    Vianey Echeverria MD

## 2023-05-17 NOTE — ASSESSMENT & PLAN NOTE
Previously grief, has been almost 1 year so will change to depression, single episode, I reviewed treatment options with her, I reviewed life style changes to help improve mood, continue current treatment. Did review trying to increase dose to 10mg but she wants to remain on 5mg.   Did review when to try and wean off

## 2023-05-17 NOTE — ASSESSMENT & PLAN NOTE
stable, needs improvement, I have recommended the following interventions: dietary management education, guidance, and counseling, encourage exercise and medication options reviewed.

## 2023-05-21 RX ORDER — ESCITALOPRAM OXALATE 5 MG/1
TABLET ORAL
Qty: 90 TABLET | Refills: 3 | Status: SHIPPED | OUTPATIENT
Start: 2023-05-21

## 2023-06-15 LAB
HBA1C MFR BLD HPLC: 5.6 %
LDL CHOLESTEROL, EXTERNAL: 109

## 2023-06-29 ENCOUNTER — OFFICE VISIT (OUTPATIENT)
Age: 63
End: 2023-06-29
Payer: MEDICARE

## 2023-06-29 VITALS
HEART RATE: 74 BPM | DIASTOLIC BLOOD PRESSURE: 78 MMHG | OXYGEN SATURATION: 98 % | SYSTOLIC BLOOD PRESSURE: 110 MMHG | BODY MASS INDEX: 33.8 KG/M2 | WEIGHT: 198 LBS | HEIGHT: 64 IN

## 2023-06-29 DIAGNOSIS — I47.1 SUPRAVENTRICULAR TACHYCARDIA (HCC): Primary | ICD-10-CM

## 2023-06-29 PROCEDURE — 1036F TOBACCO NON-USER: CPT | Performed by: INTERNAL MEDICINE

## 2023-06-29 PROCEDURE — 3017F COLORECTAL CA SCREEN DOC REV: CPT | Performed by: INTERNAL MEDICINE

## 2023-06-29 PROCEDURE — G8417 CALC BMI ABV UP PARAM F/U: HCPCS | Performed by: INTERNAL MEDICINE

## 2023-06-29 PROCEDURE — G8428 CUR MEDS NOT DOCUMENT: HCPCS | Performed by: INTERNAL MEDICINE

## 2023-06-29 PROCEDURE — 99213 OFFICE O/P EST LOW 20 MIN: CPT | Performed by: INTERNAL MEDICINE

## 2023-08-15 RX ORDER — METOPROLOL SUCCINATE 25 MG/1
TABLET, EXTENDED RELEASE ORAL
Qty: 45 TABLET | Refills: 3 | Status: SHIPPED | OUTPATIENT
Start: 2023-08-15

## 2023-11-03 ENCOUNTER — TRANSCRIBE ORDERS (OUTPATIENT)
Facility: HOSPITAL | Age: 63
End: 2023-11-03

## 2023-11-03 DIAGNOSIS — Z12.31 VISIT FOR SCREENING MAMMOGRAM: Primary | ICD-10-CM

## 2023-11-29 ENCOUNTER — HOSPITAL ENCOUNTER (OUTPATIENT)
Facility: HOSPITAL | Age: 63
Discharge: HOME | End: 2023-12-02
Attending: INTERNAL MEDICINE
Payer: MEDICARE

## 2023-11-29 VITALS — HEIGHT: 64 IN | WEIGHT: 198 LBS | BODY MASS INDEX: 33.8 KG/M2

## 2023-11-29 DIAGNOSIS — Z12.31 VISIT FOR SCREENING MAMMOGRAM: ICD-10-CM

## 2023-11-29 PROCEDURE — 77063 BREAST TOMOSYNTHESIS BI: CPT

## 2024-03-22 ENCOUNTER — OFFICE VISIT (OUTPATIENT)
Age: 64
End: 2024-03-22
Payer: MEDICARE

## 2024-03-22 VITALS
SYSTOLIC BLOOD PRESSURE: 118 MMHG | BODY MASS INDEX: 33.3 KG/M2 | WEIGHT: 194 LBS | OXYGEN SATURATION: 97 % | RESPIRATION RATE: 16 BRPM | HEART RATE: 75 BPM | DIASTOLIC BLOOD PRESSURE: 80 MMHG

## 2024-03-22 DIAGNOSIS — R00.2 PALPITATIONS: Primary | ICD-10-CM

## 2024-03-22 DIAGNOSIS — M06.09 RHEUMATOID ARTHRITIS OF MULTIPLE SITES WITH NEGATIVE RHEUMATOID FACTOR (HCC): ICD-10-CM

## 2024-03-22 PROCEDURE — G8484 FLU IMMUNIZE NO ADMIN: HCPCS | Performed by: INTERNAL MEDICINE

## 2024-03-22 PROCEDURE — G8417 CALC BMI ABV UP PARAM F/U: HCPCS | Performed by: INTERNAL MEDICINE

## 2024-03-22 PROCEDURE — 99214 OFFICE O/P EST MOD 30 MIN: CPT | Performed by: INTERNAL MEDICINE

## 2024-03-22 PROCEDURE — 3017F COLORECTAL CA SCREEN DOC REV: CPT | Performed by: INTERNAL MEDICINE

## 2024-03-22 PROCEDURE — G8427 DOCREV CUR MEDS BY ELIG CLIN: HCPCS | Performed by: INTERNAL MEDICINE

## 2024-03-22 PROCEDURE — 1036F TOBACCO NON-USER: CPT | Performed by: INTERNAL MEDICINE

## 2024-03-22 RX ORDER — BACLOFEN 10 MG/1
10 TABLET ORAL PRN
COMMUNITY

## 2024-03-22 RX ORDER — METOPROLOL SUCCINATE 25 MG/1
25 TABLET, EXTENDED RELEASE ORAL DAILY
Qty: 90 TABLET | Refills: 3 | Status: SHIPPED | OUTPATIENT
Start: 2024-03-22

## 2024-03-22 NOTE — PROGRESS NOTES
had concerns including Palpitations and Other (SVT).    Vitals:    03/22/24 1006   BP: 118/80   Site: Left Upper Arm   Position: Sitting   Pulse: 75   Resp: 16   SpO2: 97%   Weight: 88 kg (194 lb)        Chest pain No, but \"fullness\"    Refills No        1. Have you been to the ER, urgent care clinic since your last visit? No       Hospitalized since your last visit? No       Where?        Date?  
Received VO from Dr. Escobar Molina:    Change Toprol XL to 25mg full tablet.      CAC score- Followup.      See Dr. Molina in 1 yr.                 
taking: Reported on 5/16/2023)      clotrimazole-betamethasone (LOTRISONE) 1-0.05 % cream Apply topically 2 times daily (Patient not taking: Reported on 3/22/2024)      cyclobenzaprine (FLEXERIL) 10 MG tablet TAKE 1 TABLET BY MOUTH THREE TIMES A DAY AS NEEDED FOR MUSCLE SPASMS (Patient not taking: Reported on 3/22/2024)      diazePAM (VALIUM) 10 MG tablet Take 1 tablet 30-45 minutes before MRI. Must have  drive home after. (Patient not taking: Reported on 5/16/2023)      traZODone (DESYREL) 100 MG tablet Take 0.5-1 tablets by mouth (Patient not taking: Reported on 3/22/2024)       No current facility-administered medications for this visit.         No current facility-administered medications for this visit.      Diagnostic Data Review:       5/15/18-5/21/18: LOOP- Sinus w/ PACs    12/8/2017: (Juan) stress EKG and nuclear normal without ischemia with LVEF 52%.    12/6/17: ECHO- EF 50-55%; Mild TR; Trace TN    6/4/14: ECHO- EF 55%, mild TR      Lab Review:     Lab Results   Component Value Date/Time    Cholesterol, Total 157 01/27/2017 03:48 PM     Lab Results   Component Value Date/Time    Creatinine (POC) 0.9 08/06/2015 11:09 AM    Creatinine 1.12 (H) 06/24/2022 08:19 AM     Lab Results   Component Value Date/Time    BUN 12 06/24/2022 08:19 AM    BUN (POC) 6 (L) 08/06/2015 11:09 AM     Lab Results   Component Value Date/Time    Potassium 4.1 06/24/2022 08:19 AM     No results found for: HBA1C, [id number]  Lab Results   Component Value Date/Time    Hemoglobin (POC) 11.2 (L) 08/06/2015 11:09 AM    HGB 13.9 06/24/2022 08:19 AM     Lab Results   Component Value Date/Time    PLATELET 381 06/24/2022 08:19 AM     No results for input(s): CPK, CKMB, TROIQ in the last 72 hours.    No lab exists for component: CKQMB, CPKMB             ___________________________________________________    Escobar Molina MD, FACC

## 2024-04-04 ENCOUNTER — HOSPITAL ENCOUNTER (OUTPATIENT)
Facility: HOSPITAL | Age: 64
End: 2024-04-04
Attending: INTERNAL MEDICINE

## 2024-04-04 ENCOUNTER — HOSPITAL ENCOUNTER (OUTPATIENT)
Facility: HOSPITAL | Age: 64
Discharge: HOME | End: 2024-04-04
Attending: INTERNAL MEDICINE

## 2024-04-04 ENCOUNTER — TRANSCRIBE ORDERS (OUTPATIENT)
Facility: HOSPITAL | Age: 64
End: 2024-04-04

## 2024-04-04 DIAGNOSIS — R00.2 PALPITATIONS: ICD-10-CM

## 2024-04-04 DIAGNOSIS — M05.79 RHEUMATOID ARTHRITIS OF MULTIPLE SITES WITHOUT ORGAN OR SYSTEM INVOLVEMENT WITH POSITIVE RHEUMATOID FACTOR (HCC): Primary | ICD-10-CM

## 2024-04-04 DIAGNOSIS — M05.79 RHEUMATOID ARTHRITIS OF MULTIPLE SITES WITHOUT ORGAN OR SYSTEM INVOLVEMENT WITH POSITIVE RHEUMATOID FACTOR (HCC): ICD-10-CM

## 2024-04-04 DIAGNOSIS — M06.09 RHEUMATOID ARTHRITIS OF MULTIPLE SITES WITH NEGATIVE RHEUMATOID FACTOR (HCC): ICD-10-CM

## 2024-04-04 PROCEDURE — 75571 CT HRT W/O DYE W/CA TEST: CPT

## 2024-04-04 PROCEDURE — 71046 X-RAY EXAM CHEST 2 VIEWS: CPT

## 2024-04-08 NOTE — RESULT ENCOUNTER NOTE
Pls notify>> your CAC score is mildly elevated. The CAC score is 5 compared to 0 in 2019.   This means you have mild plaque buildup in your arteries of the heart. Usually this means no significant blockage and can be managed by with lifestyle changes and medications. Eat healthy diet that includes fresh vegetables, fruits, nuts, low carb foods, free of GMO and gluten. Reduce saturated fat, sugars, soda's. Aerobic exercise daily. Maintain a normal weight, stop smoking tobacco if you are currently a smoker.     Check FLP  Recommend starting Rosuvastatin 10mg po daily.

## 2024-04-09 ENCOUNTER — TELEPHONE (OUTPATIENT)
Age: 64
End: 2024-04-09

## 2024-04-09 DIAGNOSIS — E78.00 PURE HYPERCHOLESTEROLEMIA WITH TARGET LOW DENSITY LIPOPROTEIN (LDL) CHOLESTEROL LESS THAN 70 MG/DL: ICD-10-CM

## 2024-04-09 DIAGNOSIS — R93.1 AGATSTON CAC SCORE, <100: Primary | ICD-10-CM

## 2024-04-09 RX ORDER — ROSUVASTATIN CALCIUM 10 MG/1
10 TABLET, COATED ORAL NIGHTLY
Qty: 90 TABLET | Refills: 3 | Status: SHIPPED | OUTPATIENT
Start: 2024-04-09

## 2024-04-09 NOTE — TELEPHONE ENCOUNTER
Received VO from Dr. Escobar Molina:  your CAC score is mildly elevated. The CAC score is 5 compared to 0 in 2019.     Check FLP   Recommend starting Rosuvastatin 10mg po daily.     ################################    Unable to reach pt. Message left with detailed CAC and medication instructions

## 2024-05-14 ENCOUNTER — OFFICE VISIT (OUTPATIENT)
Age: 64
End: 2024-05-14
Payer: MEDICARE

## 2024-05-14 VITALS
TEMPERATURE: 97.2 F | WEIGHT: 192.8 LBS | RESPIRATION RATE: 16 BRPM | HEART RATE: 51 BPM | HEIGHT: 65 IN | DIASTOLIC BLOOD PRESSURE: 80 MMHG | OXYGEN SATURATION: 99 % | BODY MASS INDEX: 32.12 KG/M2 | SYSTOLIC BLOOD PRESSURE: 132 MMHG

## 2024-05-14 DIAGNOSIS — Z71.89 ADVANCED CARE PLANNING/COUNSELING DISCUSSION: ICD-10-CM

## 2024-05-14 DIAGNOSIS — M06.09 RHEUMATOID ARTHRITIS OF MULTIPLE SITES WITH NEGATIVE RHEUMATOID FACTOR (HCC): ICD-10-CM

## 2024-05-14 DIAGNOSIS — R10.30 LOWER ABDOMINAL PAIN: ICD-10-CM

## 2024-05-14 DIAGNOSIS — E78.00 PURE HYPERCHOLESTEROLEMIA WITH TARGET LOW DENSITY LIPOPROTEIN (LDL) CHOLESTEROL LESS THAN 70 MG/DL: ICD-10-CM

## 2024-05-14 DIAGNOSIS — Z00.00 MEDICARE ANNUAL WELLNESS VISIT, SUBSEQUENT: Primary | ICD-10-CM

## 2024-05-14 DIAGNOSIS — R93.1 AGATSTON CAC SCORE, <100: ICD-10-CM

## 2024-05-14 DIAGNOSIS — E66.9 OBESITY (BMI 30.0-34.9): ICD-10-CM

## 2024-05-14 DIAGNOSIS — R73.9 BLOOD GLUCOSE ELEVATED: ICD-10-CM

## 2024-05-14 DIAGNOSIS — Z28.21 IMMUNIZATION DECLINED: ICD-10-CM

## 2024-05-14 DIAGNOSIS — Z91.013 SHRIMP ALLERGY: ICD-10-CM

## 2024-05-14 DIAGNOSIS — F33.42 RECURRENT MAJOR DEPRESSIVE DISORDER, IN FULL REMISSION (HCC): ICD-10-CM

## 2024-05-14 DIAGNOSIS — Z86.79 HISTORY OF PSVT (PAROXYSMAL SUPRAVENTRICULAR TACHYCARDIA): ICD-10-CM

## 2024-05-14 PROCEDURE — 3017F COLORECTAL CA SCREEN DOC REV: CPT | Performed by: INTERNAL MEDICINE

## 2024-05-14 PROCEDURE — G0439 PPPS, SUBSEQ VISIT: HCPCS | Performed by: INTERNAL MEDICINE

## 2024-05-14 RX ORDER — ESCITALOPRAM OXALATE 10 MG/1
10 TABLET ORAL DAILY
Qty: 90 TABLET | Refills: 3 | Status: SHIPPED | OUTPATIENT
Start: 2024-05-14

## 2024-05-14 SDOH — ECONOMIC STABILITY: FOOD INSECURITY: WITHIN THE PAST 12 MONTHS, YOU WORRIED THAT YOUR FOOD WOULD RUN OUT BEFORE YOU GOT MONEY TO BUY MORE.: NEVER TRUE

## 2024-05-14 SDOH — ECONOMIC STABILITY: FOOD INSECURITY: WITHIN THE PAST 12 MONTHS, THE FOOD YOU BOUGHT JUST DIDN'T LAST AND YOU DIDN'T HAVE MONEY TO GET MORE.: NEVER TRUE

## 2024-05-14 SDOH — ECONOMIC STABILITY: INCOME INSECURITY: HOW HARD IS IT FOR YOU TO PAY FOR THE VERY BASICS LIKE FOOD, HOUSING, MEDICAL CARE, AND HEATING?: NOT HARD AT ALL

## 2024-05-14 ASSESSMENT — PATIENT HEALTH QUESTIONNAIRE - PHQ9
SUM OF ALL RESPONSES TO PHQ QUESTIONS 1-9: 5
2. FEELING DOWN, DEPRESSED OR HOPELESS: SEVERAL DAYS
SUM OF ALL RESPONSES TO PHQ QUESTIONS 1-9: 5
SUM OF ALL RESPONSES TO PHQ QUESTIONS 1-9: 5
6. FEELING BAD ABOUT YOURSELF - OR THAT YOU ARE A FAILURE OR HAVE LET YOURSELF OR YOUR FAMILY DOWN: NOT AT ALL
1. LITTLE INTEREST OR PLEASURE IN DOING THINGS: SEVERAL DAYS
9. THOUGHTS THAT YOU WOULD BE BETTER OFF DEAD, OR OF HURTING YOURSELF: NOT AT ALL
5. POOR APPETITE OR OVEREATING: SEVERAL DAYS
8. MOVING OR SPEAKING SO SLOWLY THAT OTHER PEOPLE COULD HAVE NOTICED. OR THE OPPOSITE, BEING SO FIGETY OR RESTLESS THAT YOU HAVE BEEN MOVING AROUND A LOT MORE THAN USUAL: NOT AT ALL
3. TROUBLE FALLING OR STAYING ASLEEP: SEVERAL DAYS
SUM OF ALL RESPONSES TO PHQ QUESTIONS 1-9: 5
10. IF YOU CHECKED OFF ANY PROBLEMS, HOW DIFFICULT HAVE THESE PROBLEMS MADE IT FOR YOU TO DO YOUR WORK, TAKE CARE OF THINGS AT HOME, OR GET ALONG WITH OTHER PEOPLE: SOMEWHAT DIFFICULT
SUM OF ALL RESPONSES TO PHQ9 QUESTIONS 1 & 2: 2
4. FEELING TIRED OR HAVING LITTLE ENERGY: SEVERAL DAYS
7. TROUBLE CONCENTRATING ON THINGS, SUCH AS READING THE NEWSPAPER OR WATCHING TELEVISION: NOT AT ALL

## 2024-05-14 ASSESSMENT — ENCOUNTER SYMPTOMS
SHORTNESS OF BREATH: 0
COUGH: 0
NAUSEA: 0
BLOOD IN STOOL: 0
CONSTIPATION: 0
ABDOMINAL PAIN: 1
VOMITING: 0
DIARRHEA: 0

## 2024-05-14 ASSESSMENT — LIFESTYLE VARIABLES
HAS A RELATIVE, FRIEND, DOCTOR, OR ANOTHER HEALTH PROFESSIONAL EXPRESSED CONCERN ABOUT YOUR DRINKING OR SUGGESTED YOU CUT DOWN: NO
HOW OFTEN DURING THE LAST YEAR HAVE YOU HAD A FEELING OF GUILT OR REMORSE AFTER DRINKING: NEVER
HAVE YOU OR SOMEONE ELSE BEEN INJURED AS A RESULT OF YOUR DRINKING: NO
HOW OFTEN DURING THE LAST YEAR HAVE YOU BEEN UNABLE TO REMEMBER WHAT HAPPENED THE NIGHT BEFORE BECAUSE YOU HAD BEEN DRINKING: NEVER
HOW OFTEN DO YOU HAVE A DRINK CONTAINING ALCOHOL: 2-3 TIMES A WEEK
HOW OFTEN DURING THE LAST YEAR HAVE YOU FAILED TO DO WHAT WAS NORMALLY EXPECTED FROM YOU BECAUSE OF DRINKING: NEVER
HOW MANY STANDARD DRINKS CONTAINING ALCOHOL DO YOU HAVE ON A TYPICAL DAY: 3 OR 4
HOW OFTEN DURING THE LAST YEAR HAVE YOU FOUND THAT YOU WERE NOT ABLE TO STOP DRINKING ONCE YOU HAD STARTED: NEVER
HOW OFTEN DURING THE LAST YEAR HAVE YOU NEEDED AN ALCOHOLIC DRINK FIRST THING IN THE MORNING TO GET YOURSELF GOING AFTER A NIGHT OF HEAVY DRINKING: NEVER

## 2024-05-14 NOTE — ASSESSMENT & PLAN NOTE
Fluctuating and control could be better.  I reviewed treatment options with her, reviewed EAP, I recommended to see a counselor, I reviewed life style changes to help improve mood, continue current treatment.  Recommended trying to increase Lexapro to 20mg but she wants to stick with current dose of now (she just increased to 10mg last month).  She is in the process of establishing with a counselor which I reviewed will be more beneficial then medications in the long run.  We did review trying to schedule social outings to get her out of her isolation rut

## 2024-05-14 NOTE — ACP (ADVANCE CARE PLANNING)
Advance Care Planning   Advance Care Planning (ACP) Physician/NP/PA (Provider) Conversation    Date of ACP Conversation: 05/14/24  Persons included in Conversation:  patient  Length of ACP Conversation in minutes: <16 minutes (Non-Billable)    We discussed the patient’s choices for care and treatment preferences in case of a health event that adversely affects decision-making abilities or is life-limiting. We discusses the differences between FULL CODE and DNR and when to consider a change in code status.  The limitations with CPR were reviewed.    Has NO ACP documents-Information provided.  She elects Full Code (Attempt Resuscitation)    The patient has appointed the following active healthcare agents:    Primary Decision Maker: Nii Palm - Spouse - [phone]     The Patient has the following current code status:    Code Status: Full Code    Andreas Luevano MD

## 2024-05-14 NOTE — PROGRESS NOTES
Medicare Annual Wellness Visit    Lelo Palm is here for Medicare AWV    Assessment & Plan   Medicare annual wellness visit, subsequent  Comments:  Available labs reviewed & discussed with her.  Will plan on repeating DEXA in '25 due to chronic prednisone use  Advanced care planning/counseling discussion  Immunization declined  Recurrent major depressive disorder, in full remission (HCC)  Assessment & Plan:  Fluctuating and control could be better.  I reviewed treatment options with her, reviewed EAP, I recommended to see a counselor, I reviewed life style changes to help improve mood, continue current treatment.  Recommended trying to increase Lexapro to 20mg but she wants to stick with current dose of now (she just increased to 10mg last month).  She is in the process of establishing with a counselor which I reviewed will be more beneficial then medications in the long run.  We did review trying to schedule social outings to get her out of her isolation rut  Orders:  -     escitalopram (LEXAPRO) 10 MG tablet; Take 1 tablet by mouth daily, Disp-90 tablet, R-3Normal  Obesity (BMI 30.0-34.9)  Assessment & Plan:  Improved and congratulated, bust still needs improvement.  I have recommended the following interventions: dietary management education, guidance, and counseling, encourage exercise and medication options reviewed.  We reviewed trying to aim for another 10-15 lbs weight loss.  History of PSVT (paroxysmal supraventricular tachycardia)  Assessment & Plan:  Asymptomatic. Monitored by specialist- no acute findings meriting change in the plan  Shrimp allergy  Comments:  new dx, she believes this caused breakout on her feet. Reviewed testing vs avoidance and she is requesting testing. Will have her see specialist  Orders:  -     External Referral To Allergy  Lower abdominal pain  Comments:  intermittent, differential reviewed, unclear. Reassured I don't think it is a AAA. She will look for triggers. Red flags &

## 2024-05-14 NOTE — ASSESSMENT & PLAN NOTE
Improved and congratulated, bust still needs improvement.  I have recommended the following interventions: dietary management education, guidance, and counseling, encourage exercise and medication options reviewed.  We reviewed trying to aim for another 10-15 lbs weight loss.

## 2024-05-15 LAB
CHOLEST SERPL-MCNC: 179 MG/DL
EST. AVERAGE GLUCOSE BLD GHB EST-MCNC: 103 MG/DL
HBA1C MFR BLD: 5.2 % (ref 4–5.6)
HDLC SERPL-MCNC: 98 MG/DL
HDLC SERPL: 1.8 (ref 0–5)
LDLC SERPL CALC-MCNC: 54.8 MG/DL (ref 0–100)
TRIGL SERPL-MCNC: 131 MG/DL
VLDLC SERPL CALC-MCNC: 26.2 MG/DL

## 2024-05-15 RX ORDER — ESCITALOPRAM OXALATE 5 MG/1
5 TABLET ORAL DAILY
Qty: 90 TABLET | Refills: 3 | OUTPATIENT
Start: 2024-05-15

## 2024-09-19 RX ORDER — METOPROLOL SUCCINATE 25 MG/1
25 TABLET, EXTENDED RELEASE ORAL DAILY
Qty: 90 TABLET | Refills: 2 | Status: SHIPPED | OUTPATIENT
Start: 2024-09-19

## 2025-01-20 ENCOUNTER — TRANSCRIBE ORDERS (OUTPATIENT)
Facility: HOSPITAL | Age: 65
End: 2025-01-20

## 2025-01-20 DIAGNOSIS — Z12.31 VISIT FOR SCREENING MAMMOGRAM: Primary | ICD-10-CM

## 2025-01-24 ENCOUNTER — HOSPITAL ENCOUNTER (OUTPATIENT)
Facility: HOSPITAL | Age: 65
Discharge: HOME | End: 2025-01-27
Attending: INTERNAL MEDICINE
Payer: MEDICARE

## 2025-01-24 VITALS — HEIGHT: 65 IN | BODY MASS INDEX: 30.82 KG/M2 | WEIGHT: 185 LBS

## 2025-01-24 DIAGNOSIS — Z12.31 VISIT FOR SCREENING MAMMOGRAM: ICD-10-CM

## 2025-01-24 PROCEDURE — 77063 BREAST TOMOSYNTHESIS BI: CPT

## 2025-02-04 ENCOUNTER — OFFICE VISIT (OUTPATIENT)
Age: 65
End: 2025-02-04
Payer: MEDICARE

## 2025-02-04 VITALS
DIASTOLIC BLOOD PRESSURE: 74 MMHG | HEART RATE: 67 BPM | HEIGHT: 65 IN | TEMPERATURE: 97.3 F | SYSTOLIC BLOOD PRESSURE: 138 MMHG | WEIGHT: 201.6 LBS | OXYGEN SATURATION: 99 % | BODY MASS INDEX: 33.59 KG/M2 | RESPIRATION RATE: 16 BRPM

## 2025-02-04 DIAGNOSIS — F33.0 MILD EPISODE OF RECURRENT MAJOR DEPRESSIVE DISORDER (HCC): ICD-10-CM

## 2025-02-04 DIAGNOSIS — R20.2 PARESTHESIA OF SKIN: Primary | ICD-10-CM

## 2025-02-04 DIAGNOSIS — R10.30 LOWER ABDOMINAL PAIN: ICD-10-CM

## 2025-02-04 DIAGNOSIS — F41.9 ANXIETY: ICD-10-CM

## 2025-02-04 DIAGNOSIS — M06.09 RHEUMATOID ARTHRITIS OF MULTIPLE SITES WITH NEGATIVE RHEUMATOID FACTOR (HCC): ICD-10-CM

## 2025-02-04 PROCEDURE — G2211 COMPLEX E/M VISIT ADD ON: HCPCS | Performed by: INTERNAL MEDICINE

## 2025-02-04 PROCEDURE — 99214 OFFICE O/P EST MOD 30 MIN: CPT | Performed by: INTERNAL MEDICINE

## 2025-02-04 PROCEDURE — G8417 CALC BMI ABV UP PARAM F/U: HCPCS | Performed by: INTERNAL MEDICINE

## 2025-02-04 PROCEDURE — 3017F COLORECTAL CA SCREEN DOC REV: CPT | Performed by: INTERNAL MEDICINE

## 2025-02-04 PROCEDURE — G8427 DOCREV CUR MEDS BY ELIG CLIN: HCPCS | Performed by: INTERNAL MEDICINE

## 2025-02-04 PROCEDURE — 1036F TOBACCO NON-USER: CPT | Performed by: INTERNAL MEDICINE

## 2025-02-04 RX ORDER — ALLOPURINOL 100 MG/1
100 TABLET ORAL DAILY
COMMUNITY

## 2025-02-04 SDOH — ECONOMIC STABILITY: FOOD INSECURITY: WITHIN THE PAST 12 MONTHS, THE FOOD YOU BOUGHT JUST DIDN'T LAST AND YOU DIDN'T HAVE MONEY TO GET MORE.: NEVER TRUE

## 2025-02-04 SDOH — ECONOMIC STABILITY: FOOD INSECURITY: WITHIN THE PAST 12 MONTHS, YOU WORRIED THAT YOUR FOOD WOULD RUN OUT BEFORE YOU GOT MONEY TO BUY MORE.: NEVER TRUE

## 2025-02-04 ASSESSMENT — ENCOUNTER SYMPTOMS
ABDOMINAL PAIN: 1
VOMITING: 0
NAUSEA: 0
SHORTNESS OF BREATH: 0
COUGH: 0
DIARRHEA: 0
CONSTIPATION: 0

## 2025-02-04 ASSESSMENT — PATIENT HEALTH QUESTIONNAIRE - PHQ9
8. MOVING OR SPEAKING SO SLOWLY THAT OTHER PEOPLE COULD HAVE NOTICED. OR THE OPPOSITE, BEING SO FIGETY OR RESTLESS THAT YOU HAVE BEEN MOVING AROUND A LOT MORE THAN USUAL: NOT AT ALL
5. POOR APPETITE OR OVEREATING: SEVERAL DAYS
10. IF YOU CHECKED OFF ANY PROBLEMS, HOW DIFFICULT HAVE THESE PROBLEMS MADE IT FOR YOU TO DO YOUR WORK, TAKE CARE OF THINGS AT HOME, OR GET ALONG WITH OTHER PEOPLE: SOMEWHAT DIFFICULT
3. TROUBLE FALLING OR STAYING ASLEEP: SEVERAL DAYS
7. TROUBLE CONCENTRATING ON THINGS, SUCH AS READING THE NEWSPAPER OR WATCHING TELEVISION: NOT AT ALL
SUM OF ALL RESPONSES TO PHQ QUESTIONS 1-9: 5
SUM OF ALL RESPONSES TO PHQ QUESTIONS 1-9: 5
2. FEELING DOWN, DEPRESSED OR HOPELESS: SEVERAL DAYS
6. FEELING BAD ABOUT YOURSELF - OR THAT YOU ARE A FAILURE OR HAVE LET YOURSELF OR YOUR FAMILY DOWN: NOT AT ALL
4. FEELING TIRED OR HAVING LITTLE ENERGY: SEVERAL DAYS
SUM OF ALL RESPONSES TO PHQ QUESTIONS 1-9: 5
9. THOUGHTS THAT YOU WOULD BE BETTER OFF DEAD, OR OF HURTING YOURSELF: NOT AT ALL
SUM OF ALL RESPONSES TO PHQ9 QUESTIONS 1 & 2: 2
SUM OF ALL RESPONSES TO PHQ QUESTIONS 1-9: 5
1. LITTLE INTEREST OR PLEASURE IN DOING THINGS: SEVERAL DAYS

## 2025-02-04 NOTE — ASSESSMENT & PLAN NOTE
Stable, unlikely to be related to above symptoms. Monitored by specialist- no acute findings meriting change in the plan

## 2025-02-04 NOTE — PROGRESS NOTES
Lelo Palm is a 64 y.o. female who was seen in clinic today (2/4/2025).  Patient was seen with Dr Josh Mcwilliams (R2 at [facility]).     Assessment & Plan:   Below is the assessment and plan developed based on review of pertinent history, physical exam, labs, studies, and medications.  Assessment & Plan  1. Paresthesia.  Chronic issue that has been on/off but now worsening. Favor cervical pathology over trigeminal neuralgia. Advised on sleeping position and using an extra pillow. Due to RA history will get MRI.     2. Lower abdominal pain.  Chronic, intermittent issue, and exact etiology is unclear. Advised to keep a symptom diary. Recent rheumatology labs will be requested. Reviewed when to consider imaging.    3. Depression and Anxiety  Chronic issue that is slightly worse. Moving more out of grief and into worsening depression and worsening anxiety. I reviewed treatment options with her, I reviewed life style changes to help improve mood, and will plan on increasing Lexapro from 10mg to 20mg.     4. Rheumatoid arthritis.  Asymptomatic. Monitored by specialist. Labs requested.     Diagnoses/Orders:   1. Paresthesia of skin  -     MRI CERVICAL SPINE WO CONTRAST; Future  2. Lower abdominal pain  3. Mild episode of recurrent major depressive disorder (HCC)  4. Anxiety  5. Rheumatoid arthritis of multiple sites with negative rheumatoid factor (HCC)  Assessment & Plan:  Stable, unlikely to be related to above symptoms. Monitored by specialist- no acute findings meriting change in the plan     Return if symptoms worsen or fail to improve.     Subjective/Objective:   Lelo was seen today for GI Problem and Head pain     Since last visit: weight has increased.     History of Present Illness  The patient presents for evaluation of paresthesia, lower abdominal pain, elevated blood pressure, and rheumatoid arthritis.    She has experienced a burning sensation in the left posterior head, extending to the top, for over 2 weeks. The

## 2025-02-13 ENCOUNTER — HOSPITAL ENCOUNTER (OUTPATIENT)
Facility: HOSPITAL | Age: 65
Discharge: HOME | End: 2025-02-16
Attending: INTERNAL MEDICINE
Payer: MEDICARE

## 2025-02-13 DIAGNOSIS — R20.2 PARESTHESIA OF SKIN: ICD-10-CM

## 2025-02-13 PROCEDURE — 72141 MRI NECK SPINE W/O DYE: CPT

## 2025-02-20 NOTE — RESULT ENCOUNTER NOTE
Results reviewed. Some changes on L side of C2/3 that could be related.  Otherwise no obvious abnormality or etiology of symptoms.

## 2025-03-24 DIAGNOSIS — F33.42 RECURRENT MAJOR DEPRESSIVE DISORDER, IN FULL REMISSION: ICD-10-CM

## 2025-03-24 RX ORDER — ESCITALOPRAM OXALATE 20 MG/1
20 TABLET ORAL DAILY
Qty: 90 TABLET | Refills: 0 | Status: SHIPPED | OUTPATIENT
Start: 2025-03-24

## 2025-03-24 NOTE — TELEPHONE ENCOUNTER
Future Appointments   Date Time Provider Department Center   3/27/2025 11:00 AM Escobar Molina MD [facility]   5/19/2025  1:00 PM Andreas Luevano MD [facility] DEP

## 2025-03-26 DIAGNOSIS — R93.1 AGATSTON CAC SCORE, <100: ICD-10-CM

## 2025-03-26 DIAGNOSIS — E78.00 PURE HYPERCHOLESTEROLEMIA WITH TARGET LOW DENSITY LIPOPROTEIN (LDL) CHOLESTEROL LESS THAN 70 MG/DL: ICD-10-CM

## 2025-03-26 RX ORDER — ROSUVASTATIN CALCIUM 10 MG/1
10 TABLET, COATED ORAL NIGHTLY
Qty: 90 TABLET | Refills: 3 | Status: SHIPPED | OUTPATIENT
Start: 2025-03-26

## 2025-03-26 NOTE — TELEPHONE ENCOUNTER
Refill per VO of Dr. Escobar Molina:    3/22/2024    Future Appointments   Date Time Provider Department Center   3/27/2025 11:00 AM [facility]   5/19/2025  1:00 PM [facility] DEP       Requested Prescriptions     Pending Prescriptions Disp Refills    rosuvastatin (CRESTOR) 10 MG tablet [Pharmacy Med Name: ROSUVASTATIN CALCIUM 10 MG TAB] 90 tablet 3     Sig: TAKE 1 TABLET BY MOUTH EVERY DAY AT NIGHT

## 2025-03-27 ENCOUNTER — ANCILLARY PROCEDURE (OUTPATIENT)
Age: 65
End: 2025-03-27
Payer: MEDICARE

## 2025-03-27 ENCOUNTER — OFFICE VISIT (OUTPATIENT)
Age: 65
End: 2025-03-27
Payer: MEDICARE

## 2025-03-27 VITALS
HEIGHT: 64 IN | WEIGHT: 199 LBS | BODY MASS INDEX: 33.97 KG/M2 | HEART RATE: 73 BPM | SYSTOLIC BLOOD PRESSURE: 126 MMHG | DIASTOLIC BLOOD PRESSURE: 86 MMHG | OXYGEN SATURATION: 98 %

## 2025-03-27 DIAGNOSIS — R00.2 PALPITATIONS: ICD-10-CM

## 2025-03-27 DIAGNOSIS — I47.10 SUPRAVENTRICULAR TACHYCARDIA: ICD-10-CM

## 2025-03-27 DIAGNOSIS — R93.1 AGATSTON CAC SCORE, <100: Primary | ICD-10-CM

## 2025-03-27 DIAGNOSIS — R00.2 PALPITATION: ICD-10-CM

## 2025-03-27 DIAGNOSIS — R00.2 HEART PALPITATIONS: ICD-10-CM

## 2025-03-27 PROCEDURE — G8428 CUR MEDS NOT DOCUMENT: HCPCS | Performed by: INTERNAL MEDICINE

## 2025-03-27 PROCEDURE — 1036F TOBACCO NON-USER: CPT | Performed by: INTERNAL MEDICINE

## 2025-03-27 PROCEDURE — 93225 XTRNL ECG REC<48 HRS REC: CPT | Performed by: INTERNAL MEDICINE

## 2025-03-27 PROCEDURE — 3017F COLORECTAL CA SCREEN DOC REV: CPT | Performed by: INTERNAL MEDICINE

## 2025-03-27 PROCEDURE — G8417 CALC BMI ABV UP PARAM F/U: HCPCS | Performed by: INTERNAL MEDICINE

## 2025-03-27 PROCEDURE — 99214 OFFICE O/P EST MOD 30 MIN: CPT | Performed by: INTERNAL MEDICINE

## 2025-03-27 NOTE — PROGRESS NOTES
Chief Complaint   Patient presents with    Palpitations     Vitals:    03/27/25 1109   BP: 126/86   BP Site: Left Upper Arm   Patient Position: Sitting   BP Cuff Size: Medium Adult   Pulse: 73   SpO2: 98%   Weight: 90.3 kg (199 lb)   Height: 1.626 m (5' 4\")      /86 (BP Site: Left Upper Arm, Patient Position: Sitting, BP Cuff Size: Medium Adult)   Pulse 73   Ht 1.626 m (5' 4\")   Wt 90.3 kg (199 lb)   SpO2 98%   BMI 34.16 kg/m²

## 2025-03-27 NOTE — PROGRESS NOTES
Office Follow-up    NAME: Lelo Palm   :  1960  MRM:  [id number]    Date:  25    Assessment and Plan:     1.  Palpitations: Holter 22: Short 4 events, longest event 8 beats fastest event 149 bpm. Previously had Holter monitor in  which demonstrated occasional supraventricular ectopies and rare PVCs with 12 couplets and 2 pauses of 2.0 seconds.    - Started on Metoprolol 25mg po  HALF tablet daily and then increased to full pill. There is some improvement but continues to have occasional symptoms of palpitations.   - Repeat 24Hr Holter.     - Her son passed away in 2022 and she has been having lot of stress.   - Sleep is better with occasional Melatonin.      2. CAC 5 24, prviously 0 in 2019. Continue Rosuvastatin. Repeat CAC score in .    3. Rheumatoid Arthritis- On Methotrexate.      4. See Dr. Molina in 1 yr.      Her daughter Meredith is my patient as well.                     ATTENTION:   This medical record was transcribed using an electronic medical records/speech recognition system.  Although proofread, it may and can contain electronic, spelling and other errors.  Corrections may be executed at a later time.  Please feel free to contact us for any clarifications as needed.      Subjective:     Lelo Palm, a 62 y.o. year-old who presents for followup.    -----------------    HPI: Lelo Palm is a 62 y.o. female with past medical history significant for rheumatoid arthritis, palpitations, PVCs, is here for evaluation.  I had last seen her back in 2019.  She was lost to follow-up.  Most recently in 2022 her son passed away and has been through a lot of stress.  She has been having symptoms of palpitations.  Symptoms are present on most days.  Described as heart racing sensation.  No symptoms of chest pain, lightheadedness or dizziness.  She is also noticed burning sensation in her lower extremities in last couple months.  Denies any symptoms of fever, chills,

## 2025-03-27 NOTE — PROGRESS NOTES
RICHIE. received from Dr. ELMER Molina MD:    24 Hr Holter- Palpitations.      See Dr. Molina in 1 yr.

## 2025-04-03 LAB — ECHO BSA: 2.02 M2

## 2025-04-03 PROCEDURE — 93227 XTRNL ECG REC<48 HR R&I: CPT | Performed by: INTERNAL MEDICINE

## 2025-04-10 ENCOUNTER — RESULTS FOLLOW-UP (OUTPATIENT)
Age: 65
End: 2025-04-10

## 2025-05-15 ENCOUNTER — TELEPHONE (OUTPATIENT)
Age: 65
End: 2025-05-15

## 2025-05-15 NOTE — TELEPHONE ENCOUNTER
Attempted to contact patient regarding upcoming Medicare wellness appointment and completion of HRA questionnaire. LVM for patient to please return call at  [phone], mcm sent as well.

## 2025-05-16 ENCOUNTER — TELEPHONE (OUTPATIENT)
Age: 65
End: 2025-05-16

## 2025-05-19 ENCOUNTER — OFFICE VISIT (OUTPATIENT)
Age: 65
End: 2025-05-19
Payer: MEDICARE

## 2025-05-19 VITALS
OXYGEN SATURATION: 98 % | RESPIRATION RATE: 16 BRPM | TEMPERATURE: 97.6 F | HEIGHT: 64 IN | WEIGHT: 198.4 LBS | SYSTOLIC BLOOD PRESSURE: 124 MMHG | BODY MASS INDEX: 33.87 KG/M2 | HEART RATE: 75 BPM | DIASTOLIC BLOOD PRESSURE: 78 MMHG

## 2025-05-19 DIAGNOSIS — F33.0 MILD EPISODE OF RECURRENT MAJOR DEPRESSIVE DISORDER: ICD-10-CM

## 2025-05-19 DIAGNOSIS — E78.00 PURE HYPERCHOLESTEROLEMIA: ICD-10-CM

## 2025-05-19 DIAGNOSIS — Z00.00 MEDICARE ANNUAL WELLNESS VISIT, SUBSEQUENT: Primary | ICD-10-CM

## 2025-05-19 DIAGNOSIS — Z28.21 IMMUNIZATION DECLINED: ICD-10-CM

## 2025-05-19 DIAGNOSIS — Z71.89 ADVANCED CARE PLANNING/COUNSELING DISCUSSION: ICD-10-CM

## 2025-05-19 DIAGNOSIS — M06.09 RHEUMATOID ARTHRITIS OF MULTIPLE SITES WITH NEGATIVE RHEUMATOID FACTOR (HCC): ICD-10-CM

## 2025-05-19 DIAGNOSIS — E66.811 OBESITY (BMI 30.0-34.9): ICD-10-CM

## 2025-05-19 DIAGNOSIS — Z87.39 HISTORY OF GOUT: ICD-10-CM

## 2025-05-19 DIAGNOSIS — Z91.013 SHRIMP ALLERGY: ICD-10-CM

## 2025-05-19 LAB
CHOLEST SERPL-MCNC: 172 MG/DL
HDLC SERPL-MCNC: 96 MG/DL
HDLC SERPL: 1.8 (ref 0–5)
LDLC SERPL CALC-MCNC: 56.8 MG/DL (ref 0–100)
TRIGL SERPL-MCNC: 96 MG/DL
VLDLC SERPL CALC-MCNC: 19.2 MG/DL

## 2025-05-19 PROCEDURE — G0439 PPPS, SUBSEQ VISIT: HCPCS | Performed by: INTERNAL MEDICINE

## 2025-05-19 PROCEDURE — 3017F COLORECTAL CA SCREEN DOC REV: CPT | Performed by: INTERNAL MEDICINE

## 2025-05-19 RX ORDER — TRAZODONE HYDROCHLORIDE 50 MG/1
50-100 TABLET ORAL NIGHTLY PRN
Qty: 30 TABLET | Refills: 1 | Status: SHIPPED | OUTPATIENT
Start: 2025-05-19

## 2025-05-19 SDOH — ECONOMIC STABILITY: FOOD INSECURITY: WITHIN THE PAST 12 MONTHS, THE FOOD YOU BOUGHT JUST DIDN'T LAST AND YOU DIDN'T HAVE MONEY TO GET MORE.: NEVER TRUE

## 2025-05-19 SDOH — ECONOMIC STABILITY: FOOD INSECURITY: WITHIN THE PAST 12 MONTHS, YOU WORRIED THAT YOUR FOOD WOULD RUN OUT BEFORE YOU GOT MONEY TO BUY MORE.: NEVER TRUE

## 2025-05-19 ASSESSMENT — LIFESTYLE VARIABLES
HAVE YOU OR SOMEONE ELSE BEEN INJURED AS A RESULT OF YOUR DRINKING: NO
HOW OFTEN DURING THE LAST YEAR HAVE YOU FOUND THAT YOU WERE NOT ABLE TO STOP DRINKING ONCE YOU HAD STARTED: NEVER
HOW OFTEN DURING THE LAST YEAR HAVE YOU BEEN UNABLE TO REMEMBER WHAT HAPPENED THE NIGHT BEFORE BECAUSE YOU HAD BEEN DRINKING: NEVER
HOW MANY STANDARD DRINKS CONTAINING ALCOHOL DO YOU HAVE ON A TYPICAL DAY: 5 OR 6
HOW OFTEN DURING THE LAST YEAR HAVE YOU FAILED TO DO WHAT WAS NORMALLY EXPECTED FROM YOU BECAUSE OF DRINKING: NEVER
HAS A RELATIVE, FRIEND, DOCTOR, OR ANOTHER HEALTH PROFESSIONAL EXPRESSED CONCERN ABOUT YOUR DRINKING OR SUGGESTED YOU CUT DOWN: YES, DURING THE PAST YEAR
HOW OFTEN DURING THE LAST YEAR HAVE YOU HAD A FEELING OF GUILT OR REMORSE AFTER DRINKING: NEVER
HOW OFTEN DO YOU HAVE A DRINK CONTAINING ALCOHOL: 4 OR MORE TIMES A WEEK
HOW OFTEN DURING THE LAST YEAR HAVE YOU NEEDED AN ALCOHOLIC DRINK FIRST THING IN THE MORNING TO GET YOURSELF GOING AFTER A NIGHT OF HEAVY DRINKING: NEVER

## 2025-05-19 ASSESSMENT — PATIENT HEALTH QUESTIONNAIRE - PHQ9
1. LITTLE INTEREST OR PLEASURE IN DOING THINGS: SEVERAL DAYS
SUM OF ALL RESPONSES TO PHQ QUESTIONS 1-9: 6
10. IF YOU CHECKED OFF ANY PROBLEMS, HOW DIFFICULT HAVE THESE PROBLEMS MADE IT FOR YOU TO DO YOUR WORK, TAKE CARE OF THINGS AT HOME, OR GET ALONG WITH OTHER PEOPLE: VERY DIFFICULT
SUM OF ALL RESPONSES TO PHQ QUESTIONS 1-9: 6
7. TROUBLE CONCENTRATING ON THINGS, SUCH AS READING THE NEWSPAPER OR WATCHING TELEVISION: NOT AT ALL
9. THOUGHTS THAT YOU WOULD BE BETTER OFF DEAD, OR OF HURTING YOURSELF: NOT AT ALL
3. TROUBLE FALLING OR STAYING ASLEEP: SEVERAL DAYS
2. FEELING DOWN, DEPRESSED OR HOPELESS: SEVERAL DAYS
SUM OF ALL RESPONSES TO PHQ QUESTIONS 1-9: 6
SUM OF ALL RESPONSES TO PHQ QUESTIONS 1-9: 6
5. POOR APPETITE OR OVEREATING: SEVERAL DAYS
4. FEELING TIRED OR HAVING LITTLE ENERGY: SEVERAL DAYS
8. MOVING OR SPEAKING SO SLOWLY THAT OTHER PEOPLE COULD HAVE NOTICED. OR THE OPPOSITE, BEING SO FIGETY OR RESTLESS THAT YOU HAVE BEEN MOVING AROUND A LOT MORE THAN USUAL: NOT AT ALL
6. FEELING BAD ABOUT YOURSELF - OR THAT YOU ARE A FAILURE OR HAVE LET YOURSELF OR YOUR FAMILY DOWN: SEVERAL DAYS

## 2025-05-19 ASSESSMENT — ENCOUNTER SYMPTOMS
CONSTIPATION: 0
COUGH: 0
ABDOMINAL PAIN: 0
SHORTNESS OF BREATH: 0
NAUSEA: 0
VOMITING: 0
BLOOD IN STOOL: 0
DIARRHEA: 0

## 2025-05-19 NOTE — PROGRESS NOTES
Medicare Annual Wellness Visit    Lelo Palm is here for Medicare AWV    Assessment & Plan   Medicare annual wellness visit, subsequent  Advanced care planning/counseling discussion  Immunization declined  Mild episode of recurrent major depressive disorder  Assessment & Plan:  No significant improvement with increased dose of Lexapro, needs further improvement.  I reviewed treatment options with her, reviewed EAP, I recommended to see a counselor, I reviewed life style changes to help improve mood, continue current treatment (lexapro 20mg) but will add in Trazodone to see if improved sleep will help. We also reviewed adding in Wellbutrin or changing to a different SSRI or an SNRI. She will update me in a few weeks if this is helping. Strongly recommended looking into some support groups.  Orders:  -     traZODone (DESYREL) 50 MG tablet; Take 1-2 tablets by mouth nightly as needed for Sleep, Disp-30 tablet, R-1Normal  Obesity (BMI 30.0-34.9)  Assessment & Plan:  Stable from last visit, above goal, and needs improvement.  I have recommended the following interventions: dietary management education, guidance, and counseling, encourage exercise and medication options reviewed.    Pure hypercholesterolemia  Assessment & Plan:  At goal, will check labs. Monitored by specialist- no acute findings meriting change in the plan  Orders:  -     Lipid Panel; Future  Rheumatoid arthritis of multiple sites with negative rheumatoid factor (HCC)  Assessment & Plan:  Chronic issue, reviewed last DEXA reviewed, discussed repeating due to chronic prednisone use but will defer.   History of gout  Assessment & Plan:  Asymptomatic, no recent flares. Monitored by specialist- no acute findings meriting change in the plan  Shrimp allergy  Comments:  no recurrence since last year, has been avoiding shrimp but eating crab. Never went to see allergist, will send for testing. Continue to avoid for now.  Orders:  -     External Referral To

## 2025-05-19 NOTE — ASSESSMENT & PLAN NOTE
Stable from last visit, above goal, and needs improvement.  I have recommended the following interventions: dietary management education, guidance, and counseling, encourage exercise and medication options reviewed.

## 2025-05-19 NOTE — PATIENT INSTRUCTIONS
When you dwell on the past or the future, you miss moments that can heal and strengthen you. You may miss moments like hearing a child laugh or seeing a friendly face when you think you're all alone.  When you're accepting, you don't  the present moment. Instead you accept your thoughts and feelings as they come.  You can practice anytime, anywhere, and in any way you choose. You can practice in many ways. Here are a few ideas:  While doing your chores, like washing the dishes, let your mind focus on what's in your hand. What does the dish feel like? Is the water warm or cold?  Go outside and take a few deep breaths. What is the air like? Is it warm or cold?  When you can, take some time at the start of your day to sit alone and think.  Take a slow walk by yourself. Count your steps while you breathe in and out.  Try yoga breathing exercises, stretches, and poses to strengthen and relax your muscles.  At work, if you can, try to stop for a few moments each hour. Note how your body feels. Let yourself regroup and let your mind settle before you return to what you were doing.  If you struggle with anxiety or \"worry thoughts,\" imagine your mind as a blue loc and your worry thoughts as clouds. Now imagine those worry thoughts floating across your mind's loc. Just let them pass by as you watch.  Follow-up care is a key part of your treatment and safety. Be sure to make and go to all appointments, and call your doctor if you are having problems. It's also a good idea to know your test results and keep a list of the medicines you take.  Where can you learn more?  Go to https://www.[employer].net/patientEd and enter M676 to learn more about \"Learning About Mindfulness for Stress.\"  Current as of: July 31, 2024  Content Version: 14.4  © 2285-1423 [employer].   Care instructions adapted under license by [employer]. If you have questions about a medical condition or this instruction, always ask your

## 2025-05-19 NOTE — ACP (ADVANCE CARE PLANNING)
Advance Care Planning   Advance Care Planning (ACP) Physician/NP/PA (Provider) Conversation    Date of ACP Conversation: 05/19/25  Persons included in Conversation:  patient  Length of ACP Conversation in minutes: <16 minutes (Non-Billable)    We discussed the patient’s choices for care and treatment preferences in case of a health event that adversely affects decision-making abilities or is life-limiting. We discusses the differences between FULL CODE and DNR and when to consider a change in code status.  The limitations with CPR were reviewed.    Has NO ACP documents-Information provided.  She elects Full Code (Attempt Resuscitation)    The patient has appointed the following active healthcare agents:    Primary Decision Maker: Nii Palm - Spouse - [phone]     Andreas Luevano MD

## 2025-05-19 NOTE — ASSESSMENT & PLAN NOTE
Asymptomatic, no recent flares. Monitored by specialist- no acute findings meriting change in the plan

## 2025-05-19 NOTE — ASSESSMENT & PLAN NOTE
No significant improvement with increased dose of Lexapro, needs further improvement.  I reviewed treatment options with her, reviewed EAP, I recommended to see a counselor, I reviewed life style changes to help improve mood, continue current treatment (lexapro 20mg) but will add in Trazodone to see if improved sleep will help. We also reviewed adding in Wellbutrin or changing to a different SSRI or an SNRI. She will update me in a few weeks if this is helping. Strongly recommended looking into some support groups.

## 2025-05-20 ENCOUNTER — RESULTS FOLLOW-UP (OUTPATIENT)
Age: 65
End: 2025-05-20

## 2025-06-16 ENCOUNTER — TELEPHONE (OUTPATIENT)
Age: 65
End: 2025-06-16

## 2025-06-16 DIAGNOSIS — F33.0 MILD EPISODE OF RECURRENT MAJOR DEPRESSIVE DISORDER: ICD-10-CM

## 2025-06-16 RX ORDER — TRAZODONE HYDROCHLORIDE 50 MG/1
50-100 TABLET ORAL NIGHTLY PRN
Qty: 30 TABLET | Refills: 1 | Status: SHIPPED | OUTPATIENT
Start: 2025-06-16

## 2025-06-19 DIAGNOSIS — F33.42 RECURRENT MAJOR DEPRESSIVE DISORDER, IN FULL REMISSION: ICD-10-CM

## 2025-06-19 RX ORDER — ESCITALOPRAM OXALATE 20 MG/1
20 TABLET ORAL DAILY
Qty: 90 TABLET | Refills: 0 | Status: SHIPPED | OUTPATIENT
Start: 2025-06-19

## 2025-06-19 NOTE — TELEPHONE ENCOUNTER
Chief Complaint   Patient presents with    Medication Refill     Last Appointment with Dr. Andreas Luevano:  5/19/2025   Future Appointments   Date Time Provider Department Center   11/19/2025  1:00 PM Andreas Luevano MD [facility]   3/27/2026  3:00 PM Escobar Molina MD [facility]       The above orders were approved via [facility] per Dr. Andreas Luevano.

## (undated) DEVICE — SET GRAV CK VLV NEEDLESS ST 3 GANGED 4WAY STPCOCK HI FLO 10

## (undated) DEVICE — SOLIDIFIER MEDC 1200ML -- CONVERT TO 356117

## (undated) DEVICE — CANN NASAL O2 CAPNOGRAPHY AD -- FILTERLINE

## (undated) DEVICE — 1200 GUARD II KIT W/5MM TUBE W/O VAC TUBE: Brand: GUARDIAN

## (undated) DEVICE — CATH IV AUTOGRD BC PNK 20GA 25 -- INSYTE

## (undated) DEVICE — JELLY,LUBE,STERILE,FLIP TOP,TUBE,4-OZ: Brand: MEDLINE

## (undated) DEVICE — FORCEPS BX L240CM JAW DIA2.8MM L CAP W/ NDL MIC MESH TOOTH

## (undated) DEVICE — ADULT SPO2 SENSOR: Brand: NELLCOR

## (undated) DEVICE — CUFF BLD PRSS AD SZ 11 FOR 25-34CM 1 TB TRIPURPOSE CONN

## (undated) DEVICE — BAG BELONG PT PERS CLEAR HANDL

## (undated) DEVICE — KIT COLON W/ 1.1OZ LUB AND 2 END

## (undated) DEVICE — Device

## (undated) DEVICE — CONTAINER SPEC 20 ML LID NEUT BUFF FORMALIN 10 % POLYPR STS

## (undated) DEVICE — CANNULA CUSH AD W/ 14FT TBG

## (undated) DEVICE — ESOPHAGEAL BALLOON DILATATION CATHETER: Brand: CRE FIXED WIRE

## (undated) DEVICE — 3M™ CUROS™ DISINFECTING CAP FOR NEEDLELESS CONNECTORS 270/CARTON 20 CARTONS/CASE CFF1-270: Brand: CUROS™

## (undated) DEVICE — BAG SPEC BIOHZRD 10 X 10 IN --

## (undated) DEVICE — CUFF RMFG BP INF SZ 11 DISP -- LAWSON OEM ITEM 238915

## (undated) DEVICE — KENDALL RADIOLUCENT FOAM MONITORING ELECTRODE -RECTANGULAR SHAPE: Brand: KENDALL

## (undated) DEVICE — SET ADMIN 16ML TBNG L100IN 2 Y INJ SITE IV PIGGY BK DISP

## (undated) DEVICE — SIMPLICITY FLUFF UNDERPAD 23X36, MODERATE: Brand: SIMPLICITY

## (undated) DEVICE — (D)SENSOR RMFG 02 PULS OXMTR -- DISC BY MFR USE ITEM 133445

## (undated) DEVICE — BITEBLOCK ENDOSCP 60FR MAXI WHT POLYETH STURDY W/ VELC WVN

## (undated) DEVICE — ELECTRODE,RADIOTRANSLUCENT,FOAM,3PK: Brand: MEDLINE